# Patient Record
Sex: MALE | Race: WHITE | Employment: PART TIME | URBAN - METROPOLITAN AREA
[De-identification: names, ages, dates, MRNs, and addresses within clinical notes are randomized per-mention and may not be internally consistent; named-entity substitution may affect disease eponyms.]

---

## 2017-01-03 ENCOUNTER — TELEPHONE (OUTPATIENT)
Dept: INTERNAL MEDICINE CLINIC | Age: 82
End: 2017-01-03

## 2017-02-03 ENCOUNTER — TELEPHONE (OUTPATIENT)
Dept: INTERNAL MEDICINE CLINIC | Age: 82
End: 2017-02-03

## 2017-02-16 ENCOUNTER — TELEPHONE (OUTPATIENT)
Dept: INTERNAL MEDICINE CLINIC | Age: 82
End: 2017-02-16

## 2017-03-17 RX ORDER — AMIODARONE HYDROCHLORIDE 200 MG/1
TABLET ORAL
Qty: 60 TABLET | Refills: 0 | Status: SHIPPED | OUTPATIENT
Start: 2017-03-17 | End: 2017-09-28 | Stop reason: DRUGHIGH

## 2017-04-07 PROBLEM — D53.9 MACROCYTIC ANEMIA: Status: ACTIVE | Noted: 2017-04-07

## 2017-04-07 PROBLEM — M25.552 LEFT HIP PAIN: Status: ACTIVE | Noted: 2017-04-07

## 2017-04-09 PROBLEM — S32.592A CLOSED FRACTURE OF RAMUS OF LEFT PUBIS (HCC): Status: ACTIVE | Noted: 2017-04-09

## 2017-04-09 PROBLEM — M25.552 LEFT HIP PAIN: Status: RESOLVED | Noted: 2017-04-07 | Resolved: 2017-04-09

## 2017-04-11 ENCOUNTER — TELEPHONE (OUTPATIENT)
Dept: INTERNAL MEDICINE CLINIC | Age: 82
End: 2017-04-11

## 2017-04-18 ENCOUNTER — CARE COORDINATION (OUTPATIENT)
Dept: CASE MANAGEMENT | Age: 82
End: 2017-04-18

## 2017-04-19 ENCOUNTER — CARE COORDINATION (OUTPATIENT)
Dept: CASE MANAGEMENT | Age: 82
End: 2017-04-19

## 2017-05-22 ENCOUNTER — TELEPHONE (OUTPATIENT)
Dept: INTERNAL MEDICINE CLINIC | Age: 82
End: 2017-05-22

## 2017-06-21 RX ORDER — TAMSULOSIN HYDROCHLORIDE 0.4 MG/1
CAPSULE ORAL
Qty: 30 CAPSULE | Refills: 5 | Status: SHIPPED | OUTPATIENT
Start: 2017-06-21

## 2017-08-26 RX ORDER — ALLOPURINOL 100 MG/1
TABLET ORAL
Qty: 180 TABLET | Refills: 3 | Status: SHIPPED | OUTPATIENT
Start: 2017-08-26 | End: 2017-09-28

## 2017-09-05 ENCOUNTER — TELEPHONE (OUTPATIENT)
Dept: INTERNAL MEDICINE CLINIC | Age: 82
End: 2017-09-05

## 2017-09-05 DIAGNOSIS — M79.602 PAIN OF LEFT UPPER EXTREMITY: Primary | ICD-10-CM

## 2017-09-14 ENCOUNTER — HOSPITAL ENCOUNTER (OUTPATIENT)
Dept: OTHER | Age: 82
Discharge: HOME | End: 2017-09-14
Attending: INTERNAL MEDICINE | Admitting: INTERNAL MEDICINE

## 2017-09-14 DIAGNOSIS — M79.602 PAIN OF LEFT UPPER EXTREMITY: ICD-10-CM

## 2017-09-26 ENCOUNTER — TELEPHONE (OUTPATIENT)
Dept: INTERNAL MEDICINE CLINIC | Age: 82
End: 2017-09-26

## 2017-09-26 DIAGNOSIS — M25.531 RIGHT WRIST PAIN: Primary | ICD-10-CM

## 2017-09-28 ENCOUNTER — OFFICE VISIT (OUTPATIENT)
Dept: CARDIOLOGY CLINIC | Age: 82
End: 2017-09-28

## 2017-09-28 VITALS
HEIGHT: 69 IN | OXYGEN SATURATION: 98 % | SYSTOLIC BLOOD PRESSURE: 138 MMHG | HEART RATE: 68 BPM | BODY MASS INDEX: 23.55 KG/M2 | WEIGHT: 159 LBS | DIASTOLIC BLOOD PRESSURE: 64 MMHG

## 2017-09-28 DIAGNOSIS — I48.0 PAROXYSMAL ATRIAL FIBRILLATION (HCC): Primary | ICD-10-CM

## 2017-09-28 PROCEDURE — 93000 ELECTROCARDIOGRAM COMPLETE: CPT | Performed by: INTERNAL MEDICINE

## 2017-09-28 PROCEDURE — 99214 OFFICE O/P EST MOD 30 MIN: CPT | Performed by: INTERNAL MEDICINE

## 2017-09-28 RX ORDER — AMIODARONE HYDROCHLORIDE 200 MG/1
200 TABLET ORAL
Qty: 90 TABLET | Refills: 3 | Status: ON HOLD | COMMUNITY
Start: 2017-09-28 | End: 2018-06-19 | Stop reason: HOSPADM

## 2017-09-28 RX ORDER — AMIODARONE HYDROCHLORIDE 200 MG/1
200 TABLET ORAL DAILY
COMMUNITY
End: 2017-09-28 | Stop reason: DRUGHIGH

## 2017-10-16 ENCOUNTER — OFFICE VISIT (OUTPATIENT)
Dept: INTERNAL MEDICINE CLINIC | Age: 82
End: 2017-10-16

## 2017-10-16 VITALS
SYSTOLIC BLOOD PRESSURE: 130 MMHG | HEART RATE: 90 BPM | DIASTOLIC BLOOD PRESSURE: 84 MMHG | RESPIRATION RATE: 16 BRPM | WEIGHT: 153 LBS | BODY MASS INDEX: 22.59 KG/M2

## 2017-10-16 DIAGNOSIS — S90.821A BLISTER OF RIGHT FOOT, INITIAL ENCOUNTER: Primary | ICD-10-CM

## 2017-10-16 PROCEDURE — 99213 OFFICE O/P EST LOW 20 MIN: CPT | Performed by: INTERNAL MEDICINE

## 2017-11-17 ENCOUNTER — TELEPHONE (OUTPATIENT)
Dept: INTERNAL MEDICINE CLINIC | Age: 82
End: 2017-11-17

## 2017-11-17 NOTE — TELEPHONE ENCOUNTER
Pt is requesting a medication for bladder problems. Refused appt.   Wants to speak to Dr Emmett Donaldson

## 2017-11-21 ENCOUNTER — TELEPHONE (OUTPATIENT)
Dept: INTERNAL MEDICINE CLINIC | Age: 82
End: 2017-11-21

## 2017-11-21 NOTE — TELEPHONE ENCOUNTER
Pt said said he gets up every hour and a half to urinate. during the night. Please prescribe medication. Call patient.      Annette

## 2017-11-22 NOTE — TELEPHONE ENCOUNTER
Pt said Flomax is working well. Could Dr. Thu Booker prescribe something else? Call patient and let him know.      Fidencio

## 2017-11-22 NOTE — TELEPHONE ENCOUNTER
I really think that he needs to be checked by a urologist as this is not as simple as just writing another prescription.

## 2017-12-15 RX ORDER — APIXABAN 5 MG/1
TABLET, FILM COATED ORAL
Qty: 180 TABLET | Refills: 3 | Status: ON HOLD | OUTPATIENT
Start: 2017-12-15 | End: 2018-06-19 | Stop reason: HOSPADM

## 2018-02-22 ENCOUNTER — OFFICE VISIT (OUTPATIENT)
Dept: INTERNAL MEDICINE CLINIC | Age: 83
End: 2018-02-22

## 2018-02-22 VITALS
RESPIRATION RATE: 16 BRPM | WEIGHT: 147 LBS | DIASTOLIC BLOOD PRESSURE: 64 MMHG | SYSTOLIC BLOOD PRESSURE: 138 MMHG | BODY MASS INDEX: 21.77 KG/M2 | HEIGHT: 69 IN

## 2018-02-22 DIAGNOSIS — G89.29 CHRONIC BILATERAL LOW BACK PAIN WITH BILATERAL SCIATICA: Primary | ICD-10-CM

## 2018-02-22 DIAGNOSIS — M54.41 CHRONIC BILATERAL LOW BACK PAIN WITH BILATERAL SCIATICA: Primary | ICD-10-CM

## 2018-02-22 DIAGNOSIS — M54.42 CHRONIC BILATERAL LOW BACK PAIN WITH BILATERAL SCIATICA: Primary | ICD-10-CM

## 2018-02-22 PROCEDURE — G8484 FLU IMMUNIZE NO ADMIN: HCPCS | Performed by: INTERNAL MEDICINE

## 2018-02-22 PROCEDURE — G8420 CALC BMI NORM PARAMETERS: HCPCS | Performed by: INTERNAL MEDICINE

## 2018-02-22 PROCEDURE — 4040F PNEUMOC VAC/ADMIN/RCVD: CPT | Performed by: INTERNAL MEDICINE

## 2018-02-22 PROCEDURE — G8427 DOCREV CUR MEDS BY ELIG CLIN: HCPCS | Performed by: INTERNAL MEDICINE

## 2018-02-22 PROCEDURE — 1036F TOBACCO NON-USER: CPT | Performed by: INTERNAL MEDICINE

## 2018-02-22 PROCEDURE — 1123F ACP DISCUSS/DSCN MKR DOCD: CPT | Performed by: INTERNAL MEDICINE

## 2018-02-22 PROCEDURE — 99214 OFFICE O/P EST MOD 30 MIN: CPT | Performed by: INTERNAL MEDICINE

## 2018-02-22 RX ORDER — PREDNISONE 20 MG/1
TABLET ORAL
Qty: 18 TABLET | Refills: 0 | Status: SHIPPED | OUTPATIENT
Start: 2018-02-22 | End: 2018-03-04

## 2018-02-26 ENCOUNTER — HOSPITAL ENCOUNTER (OUTPATIENT)
Dept: MRI IMAGING | Age: 83
Discharge: HOME | End: 2018-02-26
Attending: INTERNAL MEDICINE | Admitting: INTERNAL MEDICINE

## 2018-02-26 DIAGNOSIS — G89.29 CHRONIC BILATERAL LOW BACK PAIN WITH BILATERAL SCIATICA: ICD-10-CM

## 2018-02-26 DIAGNOSIS — M54.42 LOW BACK PAIN WITH LEFT-SIDED SCIATICA: ICD-10-CM

## 2018-02-26 DIAGNOSIS — M54.42 CHRONIC BILATERAL LOW BACK PAIN WITH BILATERAL SCIATICA: ICD-10-CM

## 2018-02-26 DIAGNOSIS — M54.41 CHRONIC BILATERAL LOW BACK PAIN WITH BILATERAL SCIATICA: ICD-10-CM

## 2018-03-14 ENCOUNTER — OFFICE VISIT (OUTPATIENT)
Dept: ORTHOPEDIC SURGERY | Age: 83
End: 2018-03-14

## 2018-03-14 VITALS
HEIGHT: 69 IN | BODY MASS INDEX: 21.77 KG/M2 | SYSTOLIC BLOOD PRESSURE: 147 MMHG | HEART RATE: 74 BPM | WEIGHT: 147 LBS | DIASTOLIC BLOOD PRESSURE: 79 MMHG

## 2018-03-14 DIAGNOSIS — S32.030A CLOSED COMPRESSION FRACTURE OF THIRD LUMBAR VERTEBRA, INITIAL ENCOUNTER: Primary | ICD-10-CM

## 2018-03-14 PROCEDURE — 4040F PNEUMOC VAC/ADMIN/RCVD: CPT | Performed by: PHYSICIAN ASSISTANT

## 2018-03-14 PROCEDURE — G8427 DOCREV CUR MEDS BY ELIG CLIN: HCPCS | Performed by: PHYSICIAN ASSISTANT

## 2018-03-14 PROCEDURE — G8484 FLU IMMUNIZE NO ADMIN: HCPCS | Performed by: PHYSICIAN ASSISTANT

## 2018-03-14 PROCEDURE — G8420 CALC BMI NORM PARAMETERS: HCPCS | Performed by: PHYSICIAN ASSISTANT

## 2018-03-14 PROCEDURE — 99203 OFFICE O/P NEW LOW 30 MIN: CPT | Performed by: PHYSICIAN ASSISTANT

## 2018-06-17 PROBLEM — I48.91 ATRIAL FIBRILLATION WITH RVR (HCC): Status: ACTIVE | Noted: 2018-06-17

## 2018-06-18 PROBLEM — F03.90 DEMENTIA (HCC): Status: ACTIVE | Noted: 2018-06-18
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
## 2018-06-18 PROBLEM — I48.91 ATRIAL FIBRILLATION WITH RVR (HCC): Status: RESOLVED | Noted: 2018-06-17 | Resolved: 2018-06-18

## 2018-06-18 PROBLEM — E87.1 HYPONATREMIA: Status: ACTIVE | Noted: 2018-06-18

## 2018-06-18 PROBLEM — I47.1 SVT (SUPRAVENTRICULAR TACHYCARDIA) (HCC): Status: ACTIVE | Noted: 2018-06-18

## 2018-06-18 PROBLEM — S06.0XAA CLOSED HEAD INJURY WITH CONCUSSION: Status: ACTIVE | Noted: 2018-06-18

## 2018-06-18 PROBLEM — S01.01XA SCALP LACERATION: Status: ACTIVE | Noted: 2018-06-18

## 2018-06-20 ENCOUNTER — TELEPHONE (OUTPATIENT)
Dept: INTERNAL MEDICINE CLINIC | Age: 83
End: 2018-06-20

## 2018-06-20 ENCOUNTER — CARE COORDINATION (OUTPATIENT)
Dept: CASE MANAGEMENT | Age: 83
End: 2018-06-20

## 2018-06-28 ENCOUNTER — TELEPHONE (OUTPATIENT)
Dept: INTERNAL MEDICINE CLINIC | Age: 83
End: 2018-06-28

## 2018-07-03 ENCOUNTER — CARE COORDINATION (OUTPATIENT)
Dept: CASE MANAGEMENT | Age: 83
End: 2018-07-03

## 2018-07-03 DIAGNOSIS — I10 HYPERTENSION, BENIGN: Primary | ICD-10-CM

## 2018-07-03 PROCEDURE — 1111F DSCHRG MED/CURRENT MED MERGE: CPT | Performed by: INTERNAL MEDICINE

## 2018-07-09 ENCOUNTER — TELEPHONE (OUTPATIENT)
Dept: INTERNAL MEDICINE CLINIC | Age: 83
End: 2018-07-09

## 2018-07-09 NOTE — TELEPHONE ENCOUNTER
Cheryl Benjamin would like to speak to Dr Janny Berman re: getting him admitted for substance abuse problem

## 2018-07-11 ENCOUNTER — TELEPHONE (OUTPATIENT)
Dept: INTERNAL MEDICINE CLINIC | Age: 83
End: 2018-07-11

## 2018-07-11 NOTE — TELEPHONE ENCOUNTER
-Consuming 1.7L (4 bottles) every 4-5 days  -Decrease in appetite  - Decrease in hygiene   -Neglecting his pet    Per family, this has been a lifelong issue with a gradual increase

## 2018-07-11 NOTE — TELEPHONE ENCOUNTER
-multiple falls, -  -head injury with sutures for 1 fall  -multiple fractures in left hand from another fall. Paperwork has been faxed to us.      bon bowers- fax [phone] attn intake

## 2018-07-12 ENCOUNTER — OFFICE VISIT (OUTPATIENT)
Dept: ORTHOPEDIC SURGERY | Age: 83
End: 2018-07-12

## 2018-07-12 VITALS
SYSTOLIC BLOOD PRESSURE: 110 MMHG | DIASTOLIC BLOOD PRESSURE: 53 MMHG | BODY MASS INDEX: 19.26 KG/M2 | HEIGHT: 69 IN | RESPIRATION RATE: 16 BRPM | WEIGHT: 130 LBS

## 2018-07-12 DIAGNOSIS — S62.645A CLOSED NONDISPLACED FRACTURE OF PROXIMAL PHALANX OF LEFT RING FINGER, INITIAL ENCOUNTER: Primary | ICD-10-CM

## 2018-07-12 PROCEDURE — G8420 CALC BMI NORM PARAMETERS: HCPCS | Performed by: ORTHOPAEDIC SURGERY

## 2018-07-12 PROCEDURE — 4040F PNEUMOC VAC/ADMIN/RCVD: CPT | Performed by: ORTHOPAEDIC SURGERY

## 2018-07-12 PROCEDURE — G8427 DOCREV CUR MEDS BY ELIG CLIN: HCPCS | Performed by: ORTHOPAEDIC SURGERY

## 2018-07-12 PROCEDURE — 1036F TOBACCO NON-USER: CPT | Performed by: ORTHOPAEDIC SURGERY

## 2018-07-12 PROCEDURE — 1101F PT FALLS ASSESS-DOCD LE1/YR: CPT | Performed by: ORTHOPAEDIC SURGERY

## 2018-07-12 PROCEDURE — 26720 TREAT FINGER FRACTURE EACH: CPT | Performed by: ORTHOPAEDIC SURGERY

## 2018-07-12 PROCEDURE — 1123F ACP DISCUSS/DSCN MKR DOCD: CPT | Performed by: ORTHOPAEDIC SURGERY

## 2018-07-12 PROCEDURE — 99213 OFFICE O/P EST LOW 20 MIN: CPT | Performed by: ORTHOPAEDIC SURGERY

## 2018-07-12 PROCEDURE — 1111F DSCHRG MED/CURRENT MED MERGE: CPT | Performed by: ORTHOPAEDIC SURGERY

## 2018-07-16 ENCOUNTER — TELEPHONE (OUTPATIENT)
Dept: INTERNAL MEDICINE CLINIC | Age: 83
End: 2018-07-16

## 2018-07-16 PROBLEM — S22.080A COMPRESSION FRACTURE OF T12 VERTEBRA (HCC): Status: ACTIVE | Noted: 2018-07-16

## 2018-07-17 ENCOUNTER — TELEPHONE (OUTPATIENT)
Dept: ORTHOPEDIC SURGERY | Age: 83
End: 2018-07-17

## 2018-07-17 ENCOUNTER — TELEPHONE (OUTPATIENT)
Dept: INTERNAL MEDICINE CLINIC | Age: 83
End: 2018-07-17

## 2018-07-17 PROBLEM — E87.1 HYPONATREMIA: Status: ACTIVE | Noted: 2018-07-17

## 2018-07-17 NOTE — TELEPHONE ENCOUNTER
Have them ask Dr. Ml Escobedo to see for his hand - he has seen him before. I reviewed the MRI and I don't think his compression fracture will need any treatment.

## 2018-07-17 NOTE — TELEPHONE ENCOUNTER
Angelina informed per Dr Funmilayo Ernandez  Per Migue, in pt note it says to tape his 3 finger together, any other treatment?

## 2018-07-19 ENCOUNTER — CARE COORDINATION (OUTPATIENT)
Dept: CASE MANAGEMENT | Age: 83
End: 2018-07-19

## 2018-07-24 ENCOUNTER — CARE COORDINATION (OUTPATIENT)
Dept: CASE MANAGEMENT | Age: 83
End: 2018-07-24

## 2018-07-24 PROBLEM — S22.080A COMPRESSION FRACTURE OF T12 VERTEBRA (HCC): Chronic | Status: ACTIVE | Noted: 2018-07-16

## 2018-07-31 ENCOUNTER — CARE COORDINATION (OUTPATIENT)
Dept: CASE MANAGEMENT | Age: 83
End: 2018-07-31

## 2018-08-09 ENCOUNTER — CARE COORDINATION (OUTPATIENT)
Dept: CASE MANAGEMENT | Age: 83
End: 2018-08-09

## 2018-08-09 NOTE — CARE COORDINATION
[facility] Update Call    2018    Patient: Jenna Merino Patient : 1932   MRN: <[id number]>  Reason for Admission: There are no discharge diagnoses documented for the most recent discharge. Discharge Date: 18 RARS: Readmission Risk Score: 18       Care Transitions Post Acute Facility Update    Care Transitions Interventions  Post Acute Facility Update  Reported Nursing Issues:  Cognitive deficits   ADLs:  Stand by Assist - Presence and Cueing   Bed Mobility:  Independent   Transfer Assistance:  Independent        Per Ros Vivar, patient will remain LTC at d/c. Patient is able to propel self in w/c, transfers self from w/c to toilet independently. He was unsafe in [facility] apt at [facility] d/t ETOH abuse and has frequent falls. Therapy will continue to work with patient, no d/c date set at this time.      Eamon Bass RN  Care Transitions Coordinator  [phone]

## 2018-08-21 ENCOUNTER — CARE COORDINATION (OUTPATIENT)
Dept: CASE MANAGEMENT | Age: 83
End: 2018-08-21

## 2018-08-21 NOTE — CARE COORDINATION
Per Susana Mendoza at [facility], patient went LTC on 8/15/18. Will exclude from care transitions.      Ailene Closs, RN  Care Transitions Coordinator  [phone]

## 2020-01-29 ENCOUNTER — TELEPHONE (OUTPATIENT)
Dept: INTERNAL MEDICINE CLINIC | Age: 85
End: 2020-01-29

## 2020-07-01 ENCOUNTER — APPOINTMENT (OUTPATIENT)
Dept: GENERAL RADIOLOGY | Age: 85
DRG: 469 | End: 2020-07-01
Payer: MEDICARE

## 2020-07-01 ENCOUNTER — HOSPITAL ENCOUNTER (INPATIENT)
Age: 85
LOS: 9 days | Discharge: NURSING, REHAB OR LONG-TERM CARE | DRG: 469 | End: 2020-07-10
Attending: INTERNAL MEDICINE | Admitting: INTERNAL MEDICINE
Payer: MEDICARE

## 2020-07-01 ENCOUNTER — APPOINTMENT (OUTPATIENT)
Dept: CT IMAGING | Age: 85
DRG: 469 | End: 2020-07-01
Payer: MEDICARE

## 2020-07-01 PROBLEM — S72.001A CLOSED RIGHT HIP FRACTURE, INITIAL ENCOUNTER (HCC): Status: ACTIVE | Noted: 2020-07-01

## 2020-07-01 LAB
ANION GAP SERPL CALCULATED.3IONS-SCNC: 10 MMOL/L (ref 3–16)
BASOPHILS ABSOLUTE: 0.1 K/UL (ref 0–0.2)
BASOPHILS RELATIVE PERCENT: 0.5 %
BILIRUBIN URINE: NEGATIVE
BLOOD, URINE: NEGATIVE
BUN BLDV-MCNC: 17 MG/DL (ref 7–20)
CALCIUM SERPL-MCNC: 8 MG/DL (ref 8.3–10.6)
CHLORIDE BLD-SCNC: 107 MMOL/L (ref 99–110)
CLARITY: CLEAR
CO2: 23 MMOL/L (ref 21–32)
COLOR: YELLOW
CREAT SERPL-MCNC: 1.4 MG/DL (ref 0.8–1.3)
EOSINOPHILS ABSOLUTE: 0.4 K/UL (ref 0–0.6)
EOSINOPHILS RELATIVE PERCENT: 3.5 %
EPITHELIAL CELLS, UA: 0 /HPF (ref 0–5)
GFR AFRICAN AMERICAN: 58
GFR NON-AFRICAN AMERICAN: 48
GLUCOSE BLD-MCNC: 101 MG/DL (ref 70–99)
GLUCOSE URINE: NEGATIVE MG/DL
HCT VFR BLD CALC: 28.6 % (ref 40.5–52.5)
HEMOGLOBIN: 9.4 G/DL (ref 13.5–17.5)
HYALINE CASTS: 0 /LPF (ref 0–8)
KETONES, URINE: NEGATIVE MG/DL
LEUKOCYTE ESTERASE, URINE: ABNORMAL
LYMPHOCYTES ABSOLUTE: 0.8 K/UL (ref 1–5.1)
LYMPHOCYTES RELATIVE PERCENT: 7.6 %
MCH RBC QN AUTO: 31.7 PG (ref 26–34)
MCHC RBC AUTO-ENTMCNC: 32.7 G/DL (ref 31–36)
MCV RBC AUTO: 96.8 FL (ref 80–100)
MICROSCOPIC EXAMINATION: YES
MONOCYTES ABSOLUTE: 0.8 K/UL (ref 0–1.3)
MONOCYTES RELATIVE PERCENT: 7.9 %
NEUTROPHILS ABSOLUTE: 8.6 K/UL (ref 1.7–7.7)
NEUTROPHILS RELATIVE PERCENT: 80.5 %
NITRITE, URINE: NEGATIVE
PDW BLD-RTO: 15.3 % (ref 12.4–15.4)
PH UA: 7 (ref 5–8)
PLATELET # BLD: 364 K/UL (ref 135–450)
PMV BLD AUTO: 7.6 FL (ref 5–10.5)
POTASSIUM REFLEX MAGNESIUM: 4.6 MMOL/L (ref 3.5–5.1)
PROTEIN UA: NEGATIVE MG/DL
RBC # BLD: 2.96 M/UL (ref 4.2–5.9)
RBC UA: 1 /HPF (ref 0–4)
SARS-COV-2, NAAT: NOT DETECTED
SODIUM BLD-SCNC: 140 MMOL/L (ref 136–145)
SPECIFIC GRAVITY UA: 1.01 (ref 1–1.03)
URINE REFLEX TO CULTURE: ABNORMAL
URINE TYPE: ABNORMAL
UROBILINOGEN, URINE: 2 E.U./DL
WBC # BLD: 10.7 K/UL (ref 4–11)
WBC UA: 1 /HPF (ref 0–5)

## 2020-07-01 PROCEDURE — 2580000003 HC RX 258: Performed by: INTERNAL MEDICINE

## 2020-07-01 PROCEDURE — U0002 COVID-19 LAB TEST NON-CDC: HCPCS

## 2020-07-01 PROCEDURE — 85025 COMPLETE CBC W/AUTO DIFF WBC: CPT

## 2020-07-01 PROCEDURE — 0HQ1XZZ REPAIR FACE SKIN, EXTERNAL APPROACH: ICD-10-PCS | Performed by: PHYSICIAN ASSISTANT

## 2020-07-01 PROCEDURE — 73502 X-RAY EXAM HIP UNI 2-3 VIEWS: CPT

## 2020-07-01 PROCEDURE — 6360000002 HC RX W HCPCS: Performed by: NURSE PRACTITIONER

## 2020-07-01 PROCEDURE — 12013 RPR F/E/E/N/L/M 2.6-5.0 CM: CPT

## 2020-07-01 PROCEDURE — 99285 EMERGENCY DEPT VISIT HI MDM: CPT

## 2020-07-01 PROCEDURE — 70450 CT HEAD/BRAIN W/O DYE: CPT

## 2020-07-01 PROCEDURE — 1200000000 HC SEMI PRIVATE

## 2020-07-01 PROCEDURE — 99221 1ST HOSP IP/OBS SF/LOW 40: CPT | Performed by: NURSE PRACTITIONER

## 2020-07-01 PROCEDURE — 80048 BASIC METABOLIC PNL TOTAL CA: CPT

## 2020-07-01 PROCEDURE — 81001 URINALYSIS AUTO W/SCOPE: CPT

## 2020-07-01 PROCEDURE — 72125 CT NECK SPINE W/O DYE: CPT

## 2020-07-01 PROCEDURE — 6360000002 HC RX W HCPCS: Performed by: PHYSICIAN ASSISTANT

## 2020-07-01 PROCEDURE — 96374 THER/PROPH/DIAG INJ IV PUSH: CPT

## 2020-07-01 PROCEDURE — 96372 THER/PROPH/DIAG INJ SC/IM: CPT

## 2020-07-01 PROCEDURE — 6370000000 HC RX 637 (ALT 250 FOR IP): Performed by: INTERNAL MEDICINE

## 2020-07-01 PROCEDURE — 6360000002 HC RX W HCPCS: Performed by: INTERNAL MEDICINE

## 2020-07-01 RX ORDER — GABAPENTIN 100 MG/1
100 CAPSULE ORAL 2 TIMES DAILY
COMMUNITY

## 2020-07-01 RX ORDER — GABAPENTIN 100 MG/1
100 CAPSULE ORAL 2 TIMES DAILY
Status: DISCONTINUED | OUTPATIENT
Start: 2020-07-01 | End: 2020-07-10 | Stop reason: HOSPADM

## 2020-07-01 RX ORDER — FUROSEMIDE 20 MG/1
20 TABLET ORAL DAILY
Status: ON HOLD | COMMUNITY
End: 2020-07-10 | Stop reason: HOSPADM

## 2020-07-01 RX ORDER — AMITRIPTYLINE HYDROCHLORIDE 25 MG/1
12.5 TABLET, FILM COATED ORAL NIGHTLY
Status: DISCONTINUED | OUTPATIENT
Start: 2020-07-01 | End: 2020-07-10 | Stop reason: HOSPADM

## 2020-07-01 RX ORDER — MAGNESIUM SULFATE IN WATER 40 MG/ML
2 INJECTION, SOLUTION INTRAVENOUS PRN
Status: DISCONTINUED | OUTPATIENT
Start: 2020-07-01 | End: 2020-07-08

## 2020-07-01 RX ORDER — TAMSULOSIN HYDROCHLORIDE 0.4 MG/1
0.4 CAPSULE ORAL DAILY
Status: DISCONTINUED | OUTPATIENT
Start: 2020-07-02 | End: 2020-07-10 | Stop reason: HOSPADM

## 2020-07-01 RX ORDER — SODIUM CHLORIDE 0.9 % (FLUSH) 0.9 %
10 SYRINGE (ML) INJECTION EVERY 12 HOURS SCHEDULED
Status: DISCONTINUED | OUTPATIENT
Start: 2020-07-01 | End: 2020-07-10 | Stop reason: HOSPADM

## 2020-07-01 RX ORDER — AMIODARONE HYDROCHLORIDE 200 MG/1
200 TABLET ORAL DAILY
Status: DISCONTINUED | OUTPATIENT
Start: 2020-07-02 | End: 2020-07-10 | Stop reason: HOSPADM

## 2020-07-01 RX ORDER — PROMETHAZINE HYDROCHLORIDE 25 MG/1
12.5 TABLET ORAL EVERY 6 HOURS PRN
Status: DISCONTINUED | OUTPATIENT
Start: 2020-07-01 | End: 2020-07-10 | Stop reason: HOSPADM

## 2020-07-01 RX ORDER — MORPHINE SULFATE 10 MG/ML
6 INJECTION, SOLUTION INTRAMUSCULAR; INTRAVENOUS ONCE
Status: DISCONTINUED | OUTPATIENT
Start: 2020-07-01 | End: 2020-07-01

## 2020-07-01 RX ORDER — ACETAMINOPHEN 325 MG/1
650 TABLET ORAL EVERY 6 HOURS PRN
Status: DISCONTINUED | OUTPATIENT
Start: 2020-07-01 | End: 2020-07-10 | Stop reason: HOSPADM

## 2020-07-01 RX ORDER — ACETAMINOPHEN 650 MG/1
650 SUPPOSITORY RECTAL EVERY 6 HOURS PRN
Status: DISCONTINUED | OUTPATIENT
Start: 2020-07-01 | End: 2020-07-10 | Stop reason: HOSPADM

## 2020-07-01 RX ORDER — POLYVINYL ALCOHOL 14 MG/ML
1 SOLUTION/ DROPS OPHTHALMIC 2 TIMES DAILY
Status: DISCONTINUED | OUTPATIENT
Start: 2020-07-01 | End: 2020-07-10 | Stop reason: HOSPADM

## 2020-07-01 RX ORDER — DIVALPROEX SODIUM 125 MG/1
250 CAPSULE, COATED PELLETS ORAL NIGHTLY
Status: DISCONTINUED | OUTPATIENT
Start: 2020-07-01 | End: 2020-07-10 | Stop reason: HOSPADM

## 2020-07-01 RX ORDER — SODIUM CHLORIDE 0.9 % (FLUSH) 0.9 %
10 SYRINGE (ML) INJECTION PRN
Status: DISCONTINUED | OUTPATIENT
Start: 2020-07-01 | End: 2020-07-10 | Stop reason: HOSPADM

## 2020-07-01 RX ORDER — AMITRIPTYLINE HYDROCHLORIDE 25 MG/1
12.5 TABLET, FILM COATED ORAL NIGHTLY
COMMUNITY

## 2020-07-01 RX ORDER — METOPROLOL SUCCINATE 50 MG/1
50 TABLET, EXTENDED RELEASE ORAL DAILY
Status: DISCONTINUED | OUTPATIENT
Start: 2020-07-02 | End: 2020-07-10 | Stop reason: HOSPADM

## 2020-07-01 RX ORDER — POTASSIUM CHLORIDE 7.45 MG/ML
10 INJECTION INTRAVENOUS PRN
Status: DISCONTINUED | OUTPATIENT
Start: 2020-07-01 | End: 2020-07-08

## 2020-07-01 RX ORDER — MORPHINE SULFATE 4 MG/ML
4 INJECTION, SOLUTION INTRAMUSCULAR; INTRAVENOUS ONCE
Status: DISCONTINUED | OUTPATIENT
Start: 2020-07-01 | End: 2020-07-01

## 2020-07-01 RX ORDER — POLYETHYLENE GLYCOL 3350 17 G/17G
17 POWDER, FOR SOLUTION ORAL DAILY PRN
Status: DISCONTINUED | OUTPATIENT
Start: 2020-07-01 | End: 2020-07-09

## 2020-07-01 RX ORDER — CHOLECALCIFEROL (VITAMIN D3) 125 MCG
5 CAPSULE ORAL NIGHTLY
COMMUNITY

## 2020-07-01 RX ORDER — ACETAMINOPHEN 500 MG
1000 TABLET ORAL NIGHTLY
Status: ON HOLD | COMMUNITY
End: 2020-07-10 | Stop reason: HOSPADM

## 2020-07-01 RX ORDER — LANOLIN ALCOHOL/MO/W.PET/CERES
4.5 CREAM (GRAM) TOPICAL NIGHTLY
Status: DISCONTINUED | OUTPATIENT
Start: 2020-07-01 | End: 2020-07-10 | Stop reason: HOSPADM

## 2020-07-01 RX ORDER — LANOLIN ALCOHOL/MO/W.PET/CERES
1000 CREAM (GRAM) TOPICAL DAILY
COMMUNITY

## 2020-07-01 RX ORDER — DIVALPROEX SODIUM 125 MG/1
250 CAPSULE, COATED PELLETS ORAL NIGHTLY
COMMUNITY

## 2020-07-01 RX ORDER — ONDANSETRON 2 MG/ML
4 INJECTION INTRAMUSCULAR; INTRAVENOUS EVERY 6 HOURS PRN
Status: DISCONTINUED | OUTPATIENT
Start: 2020-07-01 | End: 2020-07-10 | Stop reason: HOSPADM

## 2020-07-01 RX ORDER — MORPHINE SULFATE 4 MG/ML
4 INJECTION, SOLUTION INTRAMUSCULAR; INTRAVENOUS EVERY 30 MIN PRN
Status: COMPLETED | OUTPATIENT
Start: 2020-07-01 | End: 2020-07-01

## 2020-07-01 RX ORDER — MORPHINE SULFATE 10 MG/ML
6 INJECTION, SOLUTION INTRAMUSCULAR; INTRAVENOUS ONCE
Status: COMPLETED | OUTPATIENT
Start: 2020-07-01 | End: 2020-07-01

## 2020-07-01 RX ORDER — POTASSIUM CHLORIDE 20 MEQ/1
40 TABLET, EXTENDED RELEASE ORAL PRN
Status: DISCONTINUED | OUTPATIENT
Start: 2020-07-01 | End: 2020-07-08

## 2020-07-01 RX ADMIN — MORPHINE SULFATE 4 MG: 4 INJECTION INTRAVENOUS at 18:06

## 2020-07-01 RX ADMIN — ENOXAPARIN SODIUM 30 MG: 30 INJECTION SUBCUTANEOUS at 19:38

## 2020-07-01 RX ADMIN — MORPHINE SULFATE 6 MG: 10 INJECTION, SOLUTION INTRAMUSCULAR; INTRAVENOUS at 17:23

## 2020-07-01 RX ADMIN — MORPHINE SULFATE 4 MG: 4 INJECTION INTRAVENOUS at 22:19

## 2020-07-01 RX ADMIN — Medication 4.5 MG: at 22:20

## 2020-07-01 RX ADMIN — GABAPENTIN 100 MG: 100 CAPSULE ORAL at 22:20

## 2020-07-01 RX ADMIN — AMITRIPTYLINE HYDROCHLORIDE 12.5 MG: 25 TABLET, FILM COATED ORAL at 22:20

## 2020-07-01 RX ADMIN — SODIUM CHLORIDE, PRESERVATIVE FREE 10 ML: 5 INJECTION INTRAVENOUS at 22:25

## 2020-07-01 RX ADMIN — DIVALPROEX SODIUM 250 MG: 125 CAPSULE, COATED PELLETS ORAL at 22:21

## 2020-07-01 ASSESSMENT — ENCOUNTER SYMPTOMS
SHORTNESS OF BREATH: 0
VOMITING: 0
NAUSEA: 0
ABDOMINAL PAIN: 0
EYE PAIN: 0
BACK PAIN: 0
DIARRHEA: 0
COUGH: 0

## 2020-07-01 ASSESSMENT — PAIN DESCRIPTION - LOCATION
LOCATION: HIP

## 2020-07-01 ASSESSMENT — PAIN SCALES - GENERAL
PAINLEVEL_OUTOF10: 10
PAINLEVEL_OUTOF10: 10
PAINLEVEL_OUTOF10: 6
PAINLEVEL_OUTOF10: 9
PAINLEVEL_OUTOF10: 10

## 2020-07-01 ASSESSMENT — PAIN - FUNCTIONAL ASSESSMENT
PAIN_FUNCTIONAL_ASSESSMENT: PREVENTS OR INTERFERES SOME ACTIVE ACTIVITIES AND ADLS

## 2020-07-01 ASSESSMENT — PAIN DESCRIPTION - PROGRESSION
CLINICAL_PROGRESSION: NOT CHANGED

## 2020-07-01 ASSESSMENT — PAIN DESCRIPTION - PAIN TYPE
TYPE: ACUTE PAIN

## 2020-07-01 ASSESSMENT — PAIN SCALES - WONG BAKER: WONGBAKER_NUMERICALRESPONSE: 0

## 2020-07-01 ASSESSMENT — PAIN DESCRIPTION - DESCRIPTORS
DESCRIPTORS: ACHING

## 2020-07-01 ASSESSMENT — PAIN DESCRIPTION - FREQUENCY
FREQUENCY: CONTINUOUS

## 2020-07-01 ASSESSMENT — PAIN DESCRIPTION - ONSET
ONSET: ON-GOING

## 2020-07-01 ASSESSMENT — PAIN DESCRIPTION - ORIENTATION
ORIENTATION: RIGHT

## 2020-07-01 NOTE — LETTER
2020      [address] [place] [address] Ortho & Spine  Consult Billing Form:      DEMOGRAPHICS:                                                                                                              .    Patient Name:  Carol Moralez  Patient :  1932   Patient SS#:  [id number]    Patient Phone:  [phone] (home) [phone] (work) Alt. Patient Phone:    Patient Address:  [facility] [address] [facility]    PCP:  Oziel Fontaine MD  Insurance:  Payor: [employer] / Plan: [employer] / Product Type: *No Product type* /   Insurance ID Number:    DIAGNOSIS & PROCEDURE:                                                                                            .    Diagnosis:   Right hip fx    Hospital:  [facility]    Provider:  Josh NUR    SCHEDULING INFORMATION:                                                                                         .     Date of Consultation:                              Josh NUR  20     BILLING INFORMATION:                                                                                                    .    Procedure:       CPT Code Modifier

## 2020-07-01 NOTE — ED PROVIDER NOTES
[facility]        Pt Name: Cesar Yu  MRN: [id number]  [place] 9/18/1932  Date of evaluation: 7/1/2020  Provider: CALEB Park  PCP: Irena Siddiqi MD    Evaluation by LONA. My supervising physician was available for consultation. CHIEF COMPLAINT       Chief Complaint   Patient presents with    Fall     right hip pain and head laceration from fall today at NH.  pt caught feet in blanket and fell getting ouit of bed       HISTORY OF PRESENT ILLNESS   (Location, Timing/Onset, Context/Setting, Quality, Duration, Modifying Factors, Severity, Associated Signs and Symptoms)  Note limiting factors. Cesar Yu is a 80 y.o. male with PMH of Afib not on blood thinners, who presents to the emergency department for evaluation after fall from bed. The patient states that he was trying to get out of bed but his feet got caught in the sheets and he fell, landing on the right side of his face and right hip on the floor. He is not on blood thinners. He thinks that he lost consciousness for several seconds. He denies severe headache, retrograde amnesia, confusion, nausea or vomiting. He reports 10 out of 10 right hip pain that is aching and throbbing and constant. He is unable to move the right leg due to hip pain. No numbness or tingling. No neck or back pain. No other acute concerns, associated symptoms or modifying factors. Nursing Notes were all reviewed and agreed with or any disagreements were addressed in the HPI. REVIEW OF SYSTEMS    (2-9 systems for level 4, 10 or more for level 5)     Review of Systems   Constitutional: Negative for chills, fatigue and fever. Eyes: Negative for pain. Respiratory: Negative for cough and shortness of breath. Cardiovascular: Negative for chest pain. Gastrointestinal: Negative for abdominal pain, diarrhea, nausea and vomiting. Genitourinary: Negative for dysuria. Musculoskeletal: Positive for arthralgias. Negative for back pain, neck pain and neck stiffness. Skin: Positive for wound. Negative for rash. Neurological: Negative for dizziness and headaches. Psychiatric/Behavioral: Negative for confusion. Positives and Pertinent negatives as per HPI. Except as noted above in the ROS, all other systems were reviewed and negative. PAST MEDICAL HISTORY     Past Medical History:   Diagnosis Date    Alcohol abuse     Arthritis of left knee     advanced    Atrial fibrillation ([address]     HTN (hypertension)     Hyperlipidemia     PAF (paroxysmal atrial fibrillation) (HCC)          SURGICAL HISTORY     Past Surgical History:   Procedure Laterality Date    HIP FRACTURE SURGERY Left          CURRENTMEDICATIONS       Previous Medications    ACETAMINOPHEN (TYLENOL) 325 MG TABLET    Take 325-650 mg by mouth every 6 hours as needed for Pain    AMIODARONE (CORDARONE) 200 MG TABLET    Take 1 tablet by mouth daily    CALCIUM-VITAMIN D (OSCAL-500) 500-200 MG-UNIT PER TABLET    Take 1 tablet by mouth daily. METOPROLOL SUCCINATE (TOPROL XL) 50 MG EXTENDED RELEASE TABLET    Take 1 tablet by mouth daily    NUTRITIONAL SUPPLEMENTS (ENSURE PO)    Take 1 Can by mouth daily    TAMSULOSIN (FLOMAX) 0.4 MG CAPSULE    TAKE ONE CAPSULE BY MOUTH DAILY         ALLERGIES     Patient has no known allergies.     FAMILYHISTORY       Family History   Problem Relation Age of Onset    Other Mother     Other Father     Heart Disease Father           SOCIAL HISTORY       Social History     Tobacco Use    Smoking status: Never Smoker    Smokeless tobacco: Never Used   Substance Use Topics    Alcohol use: Yes     Comment: 20 months sober     Drug use: No       SCREENINGS             PHYSICAL EXAM    (up to 7 for level 4, 8 or more for level 5)     ED Triage Vitals [07/01/20 1604]   BP Temp Temp Source Pulse Resp SpO2 Height Weight   (!) 153/74 98.4 °F (36.9 °C) Oral 77 19 94 % -- 145 lb 1 oz (65.8 kg)       Physical Exam  Vitals signs and nursing note reviewed. Constitutional:       General: He is in acute distress (appears to be in pain). Appearance: He is well-developed. He is not diaphoretic. HENT:      Head: Normocephalic. Eyes:      General:         Right eye: No discharge. Left eye: No discharge. Extraocular Movements: Extraocular movements intact. Pupils: Pupils are equal, round, and reactive to light. Neck:      Musculoskeletal: Normal range of motion and neck supple. Cardiovascular:      Rate and Rhythm: Normal rate and regular rhythm. Pulmonary:      Effort: No respiratory distress. Breath sounds: No stridor. Abdominal:      Palpations: Abdomen is soft. Tenderness: There is no abdominal tenderness. Musculoskeletal:      Right hip: He exhibits decreased range of motion, tenderness and bony tenderness. Skin:     General: Skin is warm and dry. Coloration: Skin is not pale. Neurological:      Mental Status: He is alert and oriented to person, place, and time. Comments: No gross facial drooping. Moves all 4 extremities spontaneously.    Psychiatric:         Behavior: Behavior normal.         DIAGNOSTIC RESULTS   LABS:    Labs Reviewed   CBC WITH AUTO DIFFERENTIAL - Abnormal; Notable for the following components:       Result Value    RBC 2.96 (*)     Hemoglobin 9.4 (*)     Hematocrit 28.6 (*)     Neutrophils Absolute 8.6 (*)     Lymphocytes Absolute 0.8 (*)     All other components within normal limits    Narrative:     Performed at:  [facility]  [address] [facility] [address]   Phone [phone]   URINE RT REFLEX TO CULTURE - Abnormal; Notable for the following components:    Urobilinogen, Urine 2.0 (*)     Leukocyte Esterase, Urine TRACE (*)     All other components within normal limits    Narrative:     Performed at:  [facility]  [address] [place] [address] PROCEDURES   Unless otherwise noted below, none     Procedures    CRITICAL CARE TIME   N/A    CONSULTS:  IP CONSULT TO ORTHOPEDIC SURGERY      EMERGENCY DEPARTMENT COURSE and DIFFERENTIAL DIAGNOSIS/MDM:   Vitals:    Vitals:    07/01/20 1604   BP: (!) 153/74   Pulse: 77   Resp: 19   Temp: 98.4 °F (36.9 °C)   TempSrc: Oral   SpO2: 94%   Weight: 145 lb 1 oz (65.8 kg)       Patient was given the following medications:  Medications   morphine injection 6 mg (has no administration in time range)   morphine (PF) injection 4 mg (has no administration in time range)           Fall Differential Diagnosis: Cardiac Arrhythmia, Stroke, Sepsis/Infection, Anemia, Fracture, Dislocation, Epidural Hematoma, Subdural Hematoma, Parenchymal Brain contusion or bleed, Subarachnoid hemorrhage, Skull Fracture, Neck Fracture or Dislocation, other. Patient seen and examined today for head injury and right hip pain s/p fall from bed. See HPI for patient presentation. Patient is in no acute distress, nontoxic, afebrile with unremarkable vital signs. He is neurovascularly intact  Ordered IV morphine for hip pain  XR of right hip reveals subcapital right femoral neck fracture, may be pathologic as there are possible destructive bony lesions within the femoral neck. Consult was placed to Ortho. Labs and further work-up with CT head, C-spine pending    I consulted with orthopedics, spoke with Mary Pollard NP, who advised admission with the hospitalist and Ortho will consult. Dr Greta Rivero will do his surgery tomorrow. N.p.o. after midnight. CT head/cspine negative for acute process. Repaired lac as detailed below. PROCEDURE:  LACERATION REPAIR of face; right temporal region  Alpesh Bonilla or their surrogate had an opportunity to ask questions, and the risks, benefits, and alternatives were discussed. The wound was prepped and draped to maintain a sterile field. A local anesthetic was used to completely anesthetize the wound.  It was copiously irrigated. It was explored to its depth in a bloodless field with no sign of tendon, nerve, or vascular injury. No foreign bodies were identified. It was closed with 3 simple interrupted sutures of 6-0 ethilon sutures. There were no complications during the procedure. TOTAL REPAIRED WOUND LENGTH: 2.75 cm    Reviewed labs. Consult placed to hospitalist. COVID swab pending. Because of high probability of sudden clinical deterioration of the patient's condition or  further deterioration, critical care time included my full attention to the patient's condition, including chart data review, documentation, medication ordering, reviewing the patient's old records, reevaluation patient's cardiac, pulmonary and neurological status. Reassessment of vital signs. Consultations with ortho and admitting physician. Ordering, interpreting reviewing diagnostic testing. Therefore, my critical care time was 30 minutes of direct attention to the patient's condition did not include time spent on procedures.       Results for orders placed or performed during the hospital encounter of 07/01/20   CBC Auto Differential   Result Value Ref Range    WBC 10.7 4.0 - 11.0 K/uL    RBC 2.96 (L) 4.20 - 5.90 M/uL    Hemoglobin 9.4 (L) 13.5 - 17.5 g/dL    Hematocrit 28.6 (L) 40.5 - 52.5 %    MCV 96.8 80.0 - 100.0 fL    MCH 31.7 26.0 - 34.0 pg    MCHC 32.7 31.0 - 36.0 g/dL    RDW 15.3 12.4 - 15.4 %    Platelets 383 427 - 617 K/uL    MPV 7.6 5.0 - 10.5 fL    Neutrophils % 80.5 %    Lymphocytes % 7.6 %    Monocytes % 7.9 %    Eosinophils % 3.5 %    Basophils % 0.5 %    Neutrophils Absolute 8.6 (H) 1.7 - 7.7 K/uL    Lymphocytes Absolute 0.8 (L) 1.0 - 5.1 K/uL    Monocytes Absolute 0.8 0.0 - 1.3 K/uL    Eosinophils Absolute 0.4 0.0 - 0.6 K/uL    Basophils Absolute 0.1 0.0 - 0.2 K/uL   Urinalysis Reflex to Culture   Result Value Ref Range    Color, UA YELLOW Straw/Yellow    Clarity, UA Clear Clear    Glucose, Ur Negative Negative mg/dL Bilirubin Urine Negative Negative    Ketones, Urine Negative Negative mg/dL    Specific Gravity, UA 1.013 1.005 - 1.030    Blood, Urine Negative Negative    pH, UA 7.0 5.0 - 8.0    Protein, UA Negative Negative mg/dL    Urobilinogen, Urine 2.0 (A) <2.0 E.U./dL    Nitrite, Urine Negative Negative    Leukocyte Esterase, Urine TRACE (A) Negative    Microscopic Examination YES     Urine Type NotGiven        I spoke with Dr. Lyssa Tamayo. We thoroughly discussed the history, physical exam, laboratory and imaging studies, as well as, emergency department course. Based upon that discussion, we've decided to admit David Owen for further observation and evaluation of David Owen    FINAL IMPRESSION      1. Closed fracture of neck of right femur, initial encounter ([facility] [address]    2. Fall from bed, initial encounter    3. Injury of head, initial encounter    4. Facial laceration, initial encounter          DISPOSITION/PLAN   DISPOSITION        PATIENT REFERREDTO:  No follow-up provider specified.     DISCHARGE MEDICATIONS:  New Prescriptions    No medications on file       DISCONTINUED MEDICATIONS:  Discontinued Medications    No medications on file              (Please note that portions of this note were completed with a voice recognition program.  Efforts were made to edit the dictations but occasionally words are mis-transcribed.)    CALEB Henderson (electronically signed)            CALEB Henderson  07/01/20 [address] [place], [place]  07/01/20 4331

## 2020-07-01 NOTE — H&P
Hospital Medicine History & Physical      PCP: Saran Hernandez MD    Date of Admission: 7/1/2020    Date of Service: Pt seen/examined on 7/1/20 and Admitted to Inpatient     Chief Complaint: Right hip pain    History Of Present Illness: The patient is a 80 y.o. male who presents to [facility] with right hip pain. Patient was at his nursing home when he tripped and fell. He states he was unable to stand so he was brought to the emergency department. In the emergency department he was found to have a right subcapital hip fracture. There were some bony lesions on his right hip as well. Orthopedic surgery was consulted and will plan for right hip surgery tomorrow along with intraoperative biopsy. Patient will be admitted to the hospital for further care and intervention. Past Medical History:        Diagnosis Date    Alcohol abuse     Arthritis of left knee     advanced    Atrial fibrillation (HCC)     HTN (hypertension)     Hyperlipidemia     PAF (paroxysmal atrial fibrillation) (HCC)        Past Surgical History:        Procedure Laterality Date    HIP FRACTURE SURGERY Left        Medications Prior to Admission:    Prior to Admission medications    Medication Sig Start Date End Date Taking? Authorizing Provider   hypromellose (ISOPTO TEARS) 0.5 % ophthalmic solution Place 1 drop into both eyes 2 times daily    Yes Historical Provider, MD   amitriptyline (ELAVIL) 25 MG tablet Take 12.5 mg by mouth nightly   Yes Historical Provider, MD   gabapentin (NEURONTIN) 100 MG capsule Take 100 mg by mouth 2 times daily.    Yes Historical Provider, MD   furosemide (LASIX) 20 MG tablet Take 20 mg by mouth daily X 3 days until 7/4 for edema   Yes Historical Provider, MD   melatonin 5 MG TABS tablet Take 5 mg by mouth nightly   Yes Historical Provider, MD   acetaminophen (TYLENOL) 500 MG tablet Take 1,000 mg by mouth nightly   Yes Historical Provider, MD   vitamin B-12 (CYANOCOBALAMIN) 1000 MCG tablet Take 1,000 mcg by mouth daily   Yes Historical Provider, MD   divalproex (DEPAKOTE SPRINKLE) 125 MG capsule Take 250 mg by mouth nightly   Yes Historical Provider, MD   amiodarone (CORDARONE) 200 MG tablet Take 1 tablet by mouth daily 6/19/18  Yes Ellyn Gaitan MD   metoprolol succinate (TOPROL XL) 50 MG extended release tablet Take 1 tablet by mouth daily 6/19/18  Yes Ellyn Gaitan MD   tamsulosin (FLOMAX) 0.4 MG capsule TAKE ONE CAPSULE BY MOUTH DAILY 6/21/17  Yes Ellyn Gaitan MD   calcium-vitamin D (OSCAL-500) 500-200 MG-UNIT per tablet Take 1 tablet by mouth daily. Yes Historical Provider, MD   acetaminophen (TYLENOL) 325 MG tablet Take 650 mg by mouth every 6 hours as needed for Pain     Historical Provider, MD   Nutritional Supplements (ENSURE PO) Take 1 Can by mouth daily    Historical Provider, MD       Allergies:  Patient has no known allergies. Social History:  The patient currently lives at a nursing home    TOBACCO:   reports that he has never smoked. He has never used smokeless tobacco.  ETOH:   reports current alcohol use. Family History:  Reviewed in detail and negative for DM, Early CAD, Cancer, CVA. Positive as follows:        Problem Relation Age of Onset    Other Mother     Other Father     Heart Disease Father        REVIEW OF SYSTEMS:   Positive for as noted in the HPI. All other systems reviewed and negative. PHYSICAL EXAM:    BP (!) 153/74   Pulse 77   Temp 98.4 °F (36.9 °C) (Oral)   Resp 19   Wt 145 lb 1 oz (65.8 kg)   SpO2 94%   BMI 22.06 kg/m²     General appearance: No apparent distress appears stated age and cooperative. HEENT Normal cephalic, atraumatic without obvious deformity. Pupils equal, round, and reactive to light. Extra ocular muscles intact. Conjunctivae/corneas clear.   Neck: Supple, No jugular venous distention/bruits. Trachea midline without thyromegaly or adenopathy with full range of motion. Lungs: Clear to auscultation, bilaterally without Rales/Wheezes/Rhonchi with good respiratory effort. Heart: Regular rate and rhythm with Normal S1/S2 without murmurs, rubs or gallops, point of maximum impulse non-displaced  Abdomen: Soft, non-tender or non-distended without rigidity or guarding and positive bowel sounds all four quadrants. Extremities: Decreased range of motion right hip. Skin: Skin color, texture, turgor normal.  No rashes or lesions. Neurologic: Alert and oriented, neurovascularly intact with sensory/motor intact upper extremities/lower extremities, bilaterally. Cranial nerves: II-XII intact, grossly non-focal.  Mental status: Alert, oriented, thought content appropriate. Capillary Refill: Acceptable  < 3 seconds  Peripheral Pulses: +3 Easily felt, not easily obliterated with pressure      XR:  I have reviewed the XR with the following interpretation: Right hip fracture      CBC   Recent Labs     07/01/20  1701   WBC 10.7   HGB 9.4*   HCT 28.6*         RENAL  Recent Labs     07/01/20  1701      K 4.6      CO2 23   BUN 17   CREATININE 1.4*     LFT'S  No results for input(s): AST, ALT, ALB, BILIDIR, BILITOT, ALKPHOS in the last 72 hours. COAG  No results for input(s): INR in the last 72 hours. CARDIAC ENZYMES  No results for input(s): CKTOTAL, CKMB, CKMBINDEX, TROPONINI in the last 72 hours.     U/A:    Lab Results   Component Value Date    NITRITE neg 01/30/2013    COLORU YELLOW 07/01/2020    WBCUA 1 07/01/2020    RBCUA 1 07/01/2020    CLARITYU Clear 07/01/2020    SPECGRAV 1.013 07/01/2020    LEUKOCYTESUR TRACE 07/01/2020    BLOODU Negative 07/01/2020    GLUCOSEU Negative 07/01/2020       ABG  No results found for: BLR5RWN, BEART, L7QEDKQO, PHART, THGBART, SJQ4YMI, PO2ART, QJH1IMJ      PHYSICIANS CERTIFICATION:    I certify that Akira Keith is expected to be hospitalized for more than 2 midnights based on the following assessment and plan:      ASSESSMENT/PLAN:    Right hip fracture  Plan for OR tomorrow with orthopedic surgery  IV and oral pain medication  N.p.o. after midnight  Physical and Occupational Therapy    Paroxysmal atrial fibrillation  Continue home amiodarone  Hold on anticoagulation    Hypertension  Continue home medications    Depression  Continue home Elavil    DVT Prophylaxis: Lovenox, hold tomorrow morning  Diet: No diet orders on file  Code Status: Prior  PT/OT Eval Status: Ordered    Dispo -inpatient       Idalia Sidhu MD    Thank you Anaid Spears MD for the opportunity to be involved in this patient's care. If you have any questions or concerns please feel free to contact me at [phone].

## 2020-07-01 NOTE — ED NOTES
Pharmacy Medication Reconciliation Note     List of medications patient is currently taking is complete. Source of information:   1. [facility] list    No answer at [facility] when trying to verify if patient received medications today. He reports he did but he also has a history of dementia.      Zi Reynaga, PharmD, BCPS  7/1/2020  6:03 PM

## 2020-07-01 NOTE — ED TRIAGE NOTES
Pt to ED after fall today at his NH. Pt states he was getting out of bed and his foot got caught on the blanket. Pt states he fell to the floor from the bed. Pt with pain in right hip.   Pt with laceration and mild pain on right orbital.

## 2020-07-01 NOTE — CONSULTS
[facility] Orthopedic Surgery  Consult Note    This patient is seen in consultation at the request of Kevin ELLIS    Reason for Consult:  Right hip pain    CHIEF COMPLAINT:  Right hip pain    History Obtained From:  patient, electronic medical record    HISTORY OF PRESENT ILLNESS:    The patient is a 80 y.o. male who presents with right hip pain after tripping and falling in his nusring home today. He was unable to stand and brought to ER with noted. Pain is described in right hip and with the intensity of severe. Pain is described as aching, burning. Discomfort is constant. He is very [place] but awake and able to follow command. He has good recall of his fall. No other complaints at this time. Hx ETOH use    Past Medical History:        Diagnosis Date    Alcohol abuse     Arthritis of left knee     advanced    Atrial fibrillation (HCC)     HTN (hypertension)     Hyperlipidemia     PAF (paroxysmal atrial fibrillation) (HCC)        Past Surgical History:        Procedure Laterality Date    HIP FRACTURE SURGERY Left        Social History     Tobacco Use    Smoking status: Never Smoker    Smokeless tobacco: Never Used   Substance Use Topics    Alcohol use: Yes     Comment: 20 months sober        Family History   Problem Relation Age of Onset    Other Mother     Other Father     Heart Disease Father            Current Medications:   Current Facility-Administered Medications: morphine (PF) injection 4 mg, 4 mg, Intravenous, Q30 Min PRN  Allergies:   [id number]     REVIEW OF SYSTEMS:    CONSTITUTIONAL:  negative for  fevers, chills and malaise  MUSCULOSKELETAL:  positive for  myalgias, arthralgias and pain  All other ROS reviewed in chart or with patient or family and are grossly negative. PHYSICAL EXAM:    VITALS:  BP (!) 153/74   Pulse 77   Temp 98.4 °F (36.9 °C) (Oral)   Resp 19   Wt 145 lb 1 oz (65.8 kg)   SpO2 94%   BMI 22.06 kg/m²     MUSCULOSKELETAL:  bilateral foot NVI.  Wiggles toes to command. Pedal pulses are palpable. Right hip tender to palpation. Right knee slightly flexed in bed. Nontender left hip or bilateral knees or ankles. Nontender bilateral shoulder elbow or wrist. Notable right forehead laceration.    NEUROLOGIC:   Sensory:    Touch:                                       Right Lower Extremity:  normal                  Left Lower Extremity:  normal  Skin warm and dry  Resp deep and easy  Abdomen soft and round  Pulse is with regular rate and rhythm    DATA:    CBC:   Lab Results   Component Value Date    WBC 10.7 07/01/2020    RBC 2.96 07/01/2020    HGB 9.4 07/01/2020    HCT 28.6 07/01/2020    MCV 96.8 07/01/2020    MCH 31.7 07/01/2020    MCHC 32.7 07/01/2020    RDW 15.3 07/01/2020     07/01/2020    MPV 7.6 07/01/2020     WBC:    Lab Results   Component Value Date    WBC 10.7 07/01/2020     PT/INR:    Lab Results   Component Value Date    PROTIME 13.1 06/17/2018    INR 1.15 06/17/2018     PTT:    Lab Results   Component Value Date    APTT 49.5 06/17/2018   [APTT  Radiology Review:    Narrative   EXAMINATION:   TWO XRAY VIEWS OF THE RIGHT HIP       7/1/2020 4:15 pm       COMPARISON:   None.       HISTORY:   ORDERING SYSTEM PROVIDED HISTORY: pain s/p fall from bed   TECHNOLOGIST PROVIDED HISTORY:   Reason for exam:->pain s/p fall from bed   Reason for Exam: Fall (right hip pain and head laceration from fall today at   [facility]. pt caught feet in blanket and fell getting ouit of bed)   Acuity: Acute   Type of Exam: Initial       FINDINGS:   Subcapital right femoral neck fracture.  This could represent a pathologic   fracture as focal lucencies are seen within the right femoral neck.  No   dislocation.  Previous internal fixation left femoral neck.           Impression   Subcapital right femoral neck fracture, may be pathologic as there are   possible destructive bony lesions within the right femoral neck               IMPRESSION/RECOMMENDATIONS:    Fall  Right hip pain  Right subcapital hip fx  ? Bony lesions right hip, consider bx intraop.    Discussed with Dr Kathie Novak, plan right hip surgery tomorrow  NPO after MN  Lovenox x1 today then hold for surgery      Raylene Cranker  7/1/2020  5:45 PM

## 2020-07-02 ENCOUNTER — ANESTHESIA (OUTPATIENT)
Dept: OPERATING ROOM | Age: 85
DRG: 469 | End: 2020-07-02
Payer: MEDICARE

## 2020-07-02 ENCOUNTER — APPOINTMENT (OUTPATIENT)
Dept: CT IMAGING | Age: 85
DRG: 469 | End: 2020-07-02
Payer: MEDICARE

## 2020-07-02 ENCOUNTER — APPOINTMENT (OUTPATIENT)
Dept: GENERAL RADIOLOGY | Age: 85
DRG: 469 | End: 2020-07-02
Payer: MEDICARE

## 2020-07-02 ENCOUNTER — ANESTHESIA EVENT (OUTPATIENT)
Dept: OPERATING ROOM | Age: 85
DRG: 469 | End: 2020-07-02
Payer: MEDICARE

## 2020-07-02 ENCOUNTER — APPOINTMENT (OUTPATIENT)
Dept: NUCLEAR MEDICINE | Age: 85
DRG: 469 | End: 2020-07-02
Payer: MEDICARE

## 2020-07-02 LAB
ANION GAP SERPL CALCULATED.3IONS-SCNC: 17 MMOL/L (ref 3–16)
BASE EXCESS ARTERIAL: -2.4 MMOL/L (ref -3–3)
BASOPHILS ABSOLUTE: 0.1 K/UL (ref 0–0.2)
BASOPHILS RELATIVE PERCENT: 0.4 %
BUN BLDV-MCNC: 20 MG/DL (ref 7–20)
CALCIUM SERPL-MCNC: 8.6 MG/DL (ref 8.3–10.6)
CARBOXYHEMOGLOBIN ARTERIAL: 1.8 % (ref 0–1.5)
CHLORIDE BLD-SCNC: 105 MMOL/L (ref 99–110)
CO2: 18 MMOL/L (ref 21–32)
CREAT SERPL-MCNC: 1.6 MG/DL (ref 0.8–1.3)
EOSINOPHILS ABSOLUTE: 0.1 K/UL (ref 0–0.6)
EOSINOPHILS RELATIVE PERCENT: 0.3 %
GFR AFRICAN AMERICAN: 50
GFR NON-AFRICAN AMERICAN: 41
GLUCOSE BLD-MCNC: 120 MG/DL (ref 70–99)
HCO3 ARTERIAL: 21.9 MMOL/L (ref 21–29)
HCT VFR BLD CALC: 31.7 % (ref 40.5–52.5)
HEMOGLOBIN, ART, EXTENDED: 9.4 G/DL (ref 13.5–17.5)
HEMOGLOBIN: 10.1 G/DL (ref 13.5–17.5)
LYMPHOCYTES ABSOLUTE: 0.6 K/UL (ref 1–5.1)
LYMPHOCYTES RELATIVE PERCENT: 3.9 %
MCH RBC QN AUTO: 31.3 PG (ref 26–34)
MCHC RBC AUTO-ENTMCNC: 31.8 G/DL (ref 31–36)
MCV RBC AUTO: 98.4 FL (ref 80–100)
METHEMOGLOBIN ARTERIAL: 0.4 %
MONOCYTES ABSOLUTE: 1.3 K/UL (ref 0–1.3)
MONOCYTES RELATIVE PERCENT: 8.1 %
NEUTROPHILS ABSOLUTE: 14.3 K/UL (ref 1.7–7.7)
NEUTROPHILS RELATIVE PERCENT: 87.3 %
O2 CONTENT ARTERIAL: 12 ML/DL
O2 SAT, ARTERIAL: 93.3 %
O2 THERAPY: ABNORMAL
PCO2 ARTERIAL: 34.6 MMHG (ref 35–45)
PDW BLD-RTO: 15.7 % (ref 12.4–15.4)
PH ARTERIAL: 7.41 (ref 7.35–7.45)
PLATELET # BLD: 429 K/UL (ref 135–450)
PMV BLD AUTO: 7.5 FL (ref 5–10.5)
PO2 ARTERIAL: 65.3 MMHG (ref 75–108)
POTASSIUM REFLEX MAGNESIUM: 4.8 MMOL/L (ref 3.5–5.1)
PROCALCITONIN: 0.23 NG/ML (ref 0–0.15)
RBC # BLD: 3.22 M/UL (ref 4.2–5.9)
SODIUM BLD-SCNC: 140 MMOL/L (ref 136–145)
TCO2 ARTERIAL: 22.9 MMOL/L
WBC # BLD: 16.4 K/UL (ref 4–11)

## 2020-07-02 PROCEDURE — 6370000000 HC RX 637 (ALT 250 FOR IP): Performed by: INTERNAL MEDICINE

## 2020-07-02 PROCEDURE — 2700000000 HC OXYGEN THERAPY PER DAY

## 2020-07-02 PROCEDURE — 6360000002 HC RX W HCPCS: Performed by: INTERNAL MEDICINE

## 2020-07-02 PROCEDURE — 85025 COMPLETE CBC W/AUTO DIFF WBC: CPT

## 2020-07-02 PROCEDURE — 84145 PROCALCITONIN (PCT): CPT

## 2020-07-02 PROCEDURE — 2580000003 HC RX 258: Performed by: INTERNAL MEDICINE

## 2020-07-02 PROCEDURE — 6370000000 HC RX 637 (ALT 250 FOR IP): Performed by: NURSE PRACTITIONER

## 2020-07-02 PROCEDURE — 78580 LUNG PERFUSION IMAGING: CPT

## 2020-07-02 PROCEDURE — 94761 N-INVAS EAR/PLS OXIMETRY MLT: CPT

## 2020-07-02 PROCEDURE — 94669 MECHANICAL CHEST WALL OSCILL: CPT

## 2020-07-02 PROCEDURE — 36600 WITHDRAWAL OF ARTERIAL BLOOD: CPT

## 2020-07-02 PROCEDURE — A9540 TC99M MAA: HCPCS | Performed by: INTERNAL MEDICINE

## 2020-07-02 PROCEDURE — 71045 X-RAY EXAM CHEST 1 VIEW: CPT

## 2020-07-02 PROCEDURE — 99223 1ST HOSP IP/OBS HIGH 75: CPT | Performed by: INTERNAL MEDICINE

## 2020-07-02 PROCEDURE — 6360000002 HC RX W HCPCS: Performed by: NURSE PRACTITIONER

## 2020-07-02 PROCEDURE — 94150 VITAL CAPACITY TEST: CPT

## 2020-07-02 PROCEDURE — 1200000000 HC SEMI PRIVATE

## 2020-07-02 PROCEDURE — 72100 X-RAY EXAM L-S SPINE 2/3 VWS: CPT

## 2020-07-02 PROCEDURE — 36415 COLL VENOUS BLD VENIPUNCTURE: CPT

## 2020-07-02 PROCEDURE — 80048 BASIC METABOLIC PNL TOTAL CA: CPT

## 2020-07-02 PROCEDURE — 94640 AIRWAY INHALATION TREATMENT: CPT

## 2020-07-02 PROCEDURE — 71250 CT THORAX DX C-: CPT

## 2020-07-02 PROCEDURE — 3430000000 HC RX DIAGNOSTIC RADIOPHARMACEUTICAL: Performed by: INTERNAL MEDICINE

## 2020-07-02 PROCEDURE — 82803 BLOOD GASES ANY COMBINATION: CPT

## 2020-07-02 RX ORDER — FUROSEMIDE 10 MG/ML
40 INJECTION INTRAMUSCULAR; INTRAVENOUS 2 TIMES DAILY
Status: DISCONTINUED | OUTPATIENT
Start: 2020-07-02 | End: 2020-07-07

## 2020-07-02 RX ORDER — HYDROCODONE BITARTRATE AND ACETAMINOPHEN 5; 325 MG/1; MG/1
1 TABLET ORAL EVERY 6 HOURS PRN
Status: DISCONTINUED | OUTPATIENT
Start: 2020-07-02 | End: 2020-07-05

## 2020-07-02 RX ORDER — MORPHINE SULFATE 2 MG/ML
2 INJECTION, SOLUTION INTRAMUSCULAR; INTRAVENOUS EVERY 4 HOURS PRN
Status: COMPLETED | OUTPATIENT
Start: 2020-07-02 | End: 2020-07-02

## 2020-07-02 RX ORDER — FUROSEMIDE 10 MG/ML
40 INJECTION INTRAMUSCULAR; INTRAVENOUS ONCE
Status: COMPLETED | OUTPATIENT
Start: 2020-07-02 | End: 2020-07-02

## 2020-07-02 RX ORDER — IPRATROPIUM BROMIDE AND ALBUTEROL SULFATE 2.5; .5 MG/3ML; MG/3ML
1 SOLUTION RESPIRATORY (INHALATION)
Status: DISCONTINUED | OUTPATIENT
Start: 2020-07-02 | End: 2020-07-10

## 2020-07-02 RX ORDER — MORPHINE SULFATE 2 MG/ML
2 INJECTION, SOLUTION INTRAMUSCULAR; INTRAVENOUS ONCE
Status: COMPLETED | OUTPATIENT
Start: 2020-07-02 | End: 2020-07-02

## 2020-07-02 RX ADMIN — ACETAMINOPHEN 650 MG: 325 TABLET ORAL at 07:53

## 2020-07-02 RX ADMIN — FUROSEMIDE 40 MG: 10 INJECTION, SOLUTION INTRAMUSCULAR; INTRAVENOUS at 12:16

## 2020-07-02 RX ADMIN — Medication 10 ML: at 13:25

## 2020-07-02 RX ADMIN — SODIUM CHLORIDE, PRESERVATIVE FREE 10 ML: 5 INJECTION INTRAVENOUS at 12:17

## 2020-07-02 RX ADMIN — IPRATROPIUM BROMIDE AND ALBUTEROL SULFATE 1 AMPULE: .5; 3 SOLUTION RESPIRATORY (INHALATION) at 14:18

## 2020-07-02 RX ADMIN — TAMSULOSIN HYDROCHLORIDE 0.4 MG: 0.4 CAPSULE ORAL at 10:09

## 2020-07-02 RX ADMIN — METOPROLOL SUCCINATE 50 MG: 50 TABLET, EXTENDED RELEASE ORAL at 10:10

## 2020-07-02 RX ADMIN — DIVALPROEX SODIUM 250 MG: 125 CAPSULE, COATED PELLETS ORAL at 20:06

## 2020-07-02 RX ADMIN — HYDROCODONE BITARTRATE AND ACETAMINOPHEN 1 TABLET: 5; 325 TABLET ORAL at 10:09

## 2020-07-02 RX ADMIN — MORPHINE SULFATE 2 MG: 2 INJECTION, SOLUTION INTRAMUSCULAR; INTRAVENOUS at 13:24

## 2020-07-02 RX ADMIN — KIT FOR THE PREPARATION OF TECHNETIUM TC 99M ALBUMIN AGGREGATED 6 MILLICURIE: 2.5 INJECTION, POWDER, FOR SOLUTION INTRAVENOUS at 10:29

## 2020-07-02 RX ADMIN — IPRATROPIUM BROMIDE AND ALBUTEROL SULFATE 1 AMPULE: .5; 3 SOLUTION RESPIRATORY (INHALATION) at 20:03

## 2020-07-02 RX ADMIN — HYDROCODONE BITARTRATE AND ACETAMINOPHEN 1 TABLET: 5; 325 TABLET ORAL at 01:45

## 2020-07-02 RX ADMIN — MORPHINE SULFATE 2 MG: 2 INJECTION, SOLUTION INTRAMUSCULAR; INTRAVENOUS at 03:11

## 2020-07-02 RX ADMIN — OYSTER SHELL CALCIUM WITH VITAMIN D 1 TABLET: 500; 200 TABLET, FILM COATED ORAL at 10:10

## 2020-07-02 RX ADMIN — AMIODARONE HYDROCHLORIDE 200 MG: 200 TABLET ORAL at 12:16

## 2020-07-02 RX ADMIN — IPRATROPIUM BROMIDE AND ALBUTEROL SULFATE 1 AMPULE: .5; 3 SOLUTION RESPIRATORY (INHALATION) at 10:53

## 2020-07-02 RX ADMIN — MORPHINE SULFATE 2 MG: 2 INJECTION, SOLUTION INTRAMUSCULAR; INTRAVENOUS at 06:32

## 2020-07-02 RX ADMIN — GABAPENTIN 100 MG: 100 CAPSULE ORAL at 20:06

## 2020-07-02 RX ADMIN — GABAPENTIN 100 MG: 100 CAPSULE ORAL at 10:09

## 2020-07-02 RX ADMIN — SODIUM CHLORIDE, PRESERVATIVE FREE 10 ML: 5 INJECTION INTRAVENOUS at 20:06

## 2020-07-02 RX ADMIN — FUROSEMIDE 40 MG: 10 INJECTION, SOLUTION INTRAMUSCULAR; INTRAVENOUS at 18:22

## 2020-07-02 RX ADMIN — AMITRIPTYLINE HYDROCHLORIDE 12.5 MG: 25 TABLET, FILM COATED ORAL at 20:06

## 2020-07-02 RX ADMIN — POLYVINYL ALCOHOL 1 DROP: 14 SOLUTION/ DROPS OPHTHALMIC at 20:03

## 2020-07-02 RX ADMIN — HYDROCODONE BITARTRATE AND ACETAMINOPHEN 1 TABLET: 5; 325 TABLET ORAL at 18:22

## 2020-07-02 RX ADMIN — Medication 4.5 MG: at 20:07

## 2020-07-02 ASSESSMENT — PAIN DESCRIPTION - DESCRIPTORS
DESCRIPTORS: ACHING

## 2020-07-02 ASSESSMENT — PAIN DESCRIPTION - ORIENTATION
ORIENTATION: RIGHT

## 2020-07-02 ASSESSMENT — PAIN SCALES - GENERAL
PAINLEVEL_OUTOF10: 9
PAINLEVEL_OUTOF10: 6
PAINLEVEL_OUTOF10: 5
PAINLEVEL_OUTOF10: 9
PAINLEVEL_OUTOF10: 3
PAINLEVEL_OUTOF10: 4
PAINLEVEL_OUTOF10: 8
PAINLEVEL_OUTOF10: 9
PAINLEVEL_OUTOF10: 7
PAINLEVEL_OUTOF10: 8
PAINLEVEL_OUTOF10: 7
PAINLEVEL_OUTOF10: 4
PAINLEVEL_OUTOF10: 3

## 2020-07-02 ASSESSMENT — PAIN DESCRIPTION - ONSET
ONSET: ON-GOING

## 2020-07-02 ASSESSMENT — PAIN DESCRIPTION - FREQUENCY
FREQUENCY: CONTINUOUS

## 2020-07-02 ASSESSMENT — PAIN DESCRIPTION - PAIN TYPE
TYPE: ACUTE PAIN

## 2020-07-02 ASSESSMENT — PAIN DESCRIPTION - LOCATION
LOCATION: HIP

## 2020-07-02 ASSESSMENT — PAIN DESCRIPTION - PROGRESSION
CLINICAL_PROGRESSION: GRADUALLY IMPROVING
CLINICAL_PROGRESSION: GRADUALLY IMPROVING
CLINICAL_PROGRESSION: GRADUALLY WORSENING
CLINICAL_PROGRESSION: GRADUALLY IMPROVING
CLINICAL_PROGRESSION: GRADUALLY WORSENING
CLINICAL_PROGRESSION: NOT CHANGED

## 2020-07-02 ASSESSMENT — PAIN - FUNCTIONAL ASSESSMENT

## 2020-07-02 NOTE — PROGRESS NOTES
PATIENT NAME:                     Remer Cowden  YOB: 1932   MEDICAL RECORD#         [id number]  SURGEON:                 Bill Bettencourt      CHIEF COMPLAINT: right hip pain. HISTORY OF PRESENT ILLNESS: Mr. Remer Cowden is a  80 y.o. male who reportedly slipped and fell at nursing home injuring his right hip. He was unable to bear weight on the affected extremity. He was first seen and evaluated in the emergency room , where he was x-rayed and Orthopedics was consulted. The pain is sharp and not radiating. No other complaints. The patient was COVID 19+ last week and he was on the [place] unit at [facility] when the fall occurred. He did have a rapid test last night which was negative. The patient reportedly desatted down to 30% this morning. PAST MEDICAL HISTORY:   Past Medical History:   Diagnosis Date    Alcohol abuse     Arthritis of left knee     advanced    Atrial fibrillation (HCC)     HTN (hypertension)     Hyperlipidemia     PAF (paroxysmal atrial fibrillation) (HCC)         MEDICATIONS: The patient's medication reconciliation form was extensively reviewed and noted.      ALLERGIES: No Known Allergies     SOCIAL HISTORY:   Social History     Socioeconomic History    Marital status:      Spouse name: Not on file    Number of children: Not on file    Years of education: Not on file    Highest education level: Not on file   Occupational History    Occupation: part time [address] resource strain: Not on file    Food insecurity     Worry: Not on file     Inability: Not on file   [place] [facility] needs     Medical: Not on file     Non-medical: Not on file   Tobacco Use    Smoking status: Never Smoker    Smokeless tobacco: Never Used   Substance and Sexual Activity    Alcohol use: Yes     Comment: 20 months sober     Drug use: No    Sexual activity: Not Currently   Lifestyle    Physical activity     Days per week: Not on file Minutes per session: Not on file    Stress: Not on file   Relationships    Social connections     Talks on phone: Not on file     Gets together: Not on file     Attends Alevism service: Not on file     Active member of club or organization: Not on file     Attends meetings of clubs or organizations: Not on file     Relationship status: Not on file    Intimate partner violence     Fear of current or ex partner: Not on file     Emotionally abused: Not on file     Physically abused: Not on file     Forced sexual activity: Not on file   Other Topics Concern    Not on file   Social History Narrative    Not on file        PAST SURGICAL HISTORY:   Past Surgical History:   Procedure Laterality Date    HIP FRACTURE SURGERY Left         REVIEW OF SYSTEMS: Negative for fever, chills or night sweats. Review of   systems is negative except as mentioned in the history of present illness. FAMILY MEDICAL HISTORY:   Family History   Problem Relation Age of Onset    Other Mother     Other Father     Heart Disease Father         PHYSICAL EXAMINATION:   GENERAL: The patient is alert and oriented x3. VITAL SIGNS: Stable. He is afebrile. EXTREMITIES: The right lower extremity is shortened and externally rotated. The patient has severe pain with light log rolling of the right lower   extremity. The patient is neurovascularly intact in both lower extremities. Examination of the right upper extremity is unremarkable. Examination of the   left upper extremity is unremarkable. Examination of the contralateral lower   extremity is unremarkable. Examination of the skin reveals no lesions or ulcerations in the extremities. X-rays: An AP pelvis and lateral of the hip were reviewed. The x-rays were extensively reviewed. There is evidence of femoral head collapse at the superior lateral aspect of the femoral head consistent with avascular necrosis. There are cystic changes within the femoral head as well.   There is evidence of a new acute femoral neck fracture. 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 At this time the pulmonology team has been consulted by the hospitalist.  As long as the patient is cleared, I do feel he will benefit from hemiarthroplasty as soon as possible. The longer he is in bed, the more likely his pulmonary disease will worsen. All risks including but not limited to blood loss, infection, persistent pain,stiffness, weakness, hardware failure, deep vein thrombosis, dislocation,  neurovascular injury, and the risks of anesthesia were discussed. The patient/family understands all risks and benefits of the procedure and agrees to proceed.

## 2020-07-02 NOTE — PROGRESS NOTES
CXR with RLL opacity and vascular congestion. Needs non-contrasted CT to define airspace disease.   If felt to be related to pulmonary contusion may need to hold off on surgery for now    Ann Spicer DO  [facility]  [phone]

## 2020-07-02 NOTE — PROGRESS NOTES
Patient arrived to room 21489 [phone] from [facility] ED. Patient is A/Ox4. Oriented patient to room and call light. Fall assessment completed. Patient denies any further needs at this time, will continue to monitor and assess for needs and comfort.

## 2020-07-02 NOTE — PROGRESS NOTES
Tele cam requested for patient as he repeatedly removes his oxygen, O2 sensor, and tele leads despite frequent redirection. Waiting for call back from [facility] regarding the availability of a tele camera.

## 2020-07-02 NOTE — PROGRESS NOTES
Patient surgery for today canceled per Dr. Effie Stout r/t respiratory distress this morning and continued demand for 12L high flow O2. Patient aware of canceled surgery. Diet order obtained. Pre op alerted to canceled surgery.

## 2020-07-02 NOTE — PROGRESS NOTES
Reviewed CXR, pulm congestion. IV lasix 40 mg X1 ordered. V/Q scan pending. Per pulm, if VQ scan returns with low probability for PE, OK to proceed with surgery. If V/Q scan returns with high probability for PE, may need to hold on surgery. Perfectserve message sent to Dr. Venita Christina.

## 2020-07-02 NOTE — PROGRESS NOTES
Intended to ordere VQ scan but it appears only the perfusion testing was done, without ventilation part. CT-chest with pleural effusion. Has IV lasix ordered, pending to be given. Will place on scheduled IV lasix. COVID-19 PCR requested by nursing staff - it will be positive and does not indicate acute infection, unless it will be reported as IgG vs igM. Patient recently had COVID-19 diagnosis. He had a negative rapid yesterday. Repeating a COVID-19 PCR today is only going to provide an expected result; hence, the test is not clinically necessary at this time. D/w nursing staff.

## 2020-07-02 NOTE — PROGRESS NOTES
[facility] Orthopedic Surgery   Progress Note      S/P :  SUBJECTIVE  In bed. Alert and oriented to self and situation. SOB this AM with low SaO2 and now on NRB mask. Hospitalist to see pt. Pt states severe low back pain this AM and right hip pain. Pain is   described in right hip and groin and low back and with the intensity of severe. Pain is described as aching, pressure. OBJECTIVE              Physical                      VITALS:  BP (!) 132/49   Pulse 83   Temp 98.5 °F (36.9 °C) (Oral)   Resp 14   Ht 5' 8\" (1.727 m)   Wt 145 lb 1 oz (65.8 kg)   SpO2 93%   BMI 22.06 kg/m²                     MUSCULOSKELETAL:  right foot NVI. Wiggles toes to command. Pedal pulses are palpable. Right leg externally rotated and shortened. Grossly tender low back near sacrum, No abdominal tenderness or bruising noted. Laceration right forehead stable.                     NEUROLOGIC:                                  Sensory:  Touch:  Right Lower Extremity:  normal                                                  Data       CBC:   Lab Results   Component Value Date    WBC 16.4 07/02/2020    RBC 3.22 07/02/2020    HGB 10.1 07/02/2020    HCT 31.7 07/02/2020    MCV 98.4 07/02/2020    MCH 31.3 07/02/2020    MCHC 31.8 07/02/2020    RDW 15.7 07/02/2020     07/02/2020    MPV 7.5 07/02/2020        WBC:    Lab Results   Component Value Date    WBC 16.4 07/02/2020        Hemoglobin/Hematocrit:    Lab Results   Component Value Date    HGB 10.1 07/02/2020    HCT 31.7 07/02/2020        PT/INR:    Lab Results   Component Value Date    PROTIME 13.1 06/17/2018    INR 1.15 06/17/2018              Current Inpatient Medications             Current Facility-Administered Medications: HYDROcodone-acetaminophen (NORCO) 5-325 MG per tablet 1 tablet, 1 tablet, Oral, Q6H PRN  morphine (PF) injection 2 mg, 2 mg, Intravenous, Q4H PRN  amiodarone (CORDARONE) tablet 200 mg, 200 mg, Oral, Daily  amitriptyline (ELAVIL) tablet 12.5 mg, 12.5 mg, Oral, Nightly  calcium-vitamin D 500-200 MG-UNIT per tablet 1 tablet, 1 tablet, Oral, Daily with breakfast  divalproex (DEPAKOTE SPRINKLE) capsule 250 mg, 250 mg, Oral, Nightly  gabapentin (NEURONTIN) capsule 100 mg, 100 mg, Oral, BID  polyvinyl alcohol (LIQUIFILM TEARS) 1.4 % ophthalmic solution 1 drop, 1 drop, Both Eyes, BID  melatonin tablet 4.5 mg, 4.5 mg, Oral, Nightly  metoprolol succinate (TOPROL XL) extended release tablet 50 mg, 50 mg, Oral, Daily  tamsulosin (FLOMAX) capsule 0.4 mg, 0.4 mg, Oral, Daily  sodium chloride flush 0.9 % injection 10 mL, 10 mL, Intravenous, 2 times per day  sodium chloride flush 0.9 % injection 10 mL, 10 mL, Intravenous, PRN  acetaminophen (TYLENOL) tablet 650 mg, 650 mg, Oral, Q6H PRN **OR** acetaminophen (TYLENOL) suppository 650 mg, 650 mg, Rectal, Q6H PRN  polyethylene glycol (GLYCOLAX) packet 17 g, 17 g, Oral, Daily PRN  promethazine (PHENERGAN) tablet 12.5 mg, 12.5 mg, Oral, Q6H PRN **OR** ondansetron (ZOFRAN) injection 4 mg, 4 mg, Intravenous, Q6H PRN  enoxaparin (LOVENOX) injection 40 mg, 40 mg, Subcutaneous, Daily  potassium chloride (KLOR-CON M) extended release tablet 40 mEq, 40 mEq, Oral, PRN **OR** potassium bicarb-citric acid (EFFER-K) effervescent tablet 40 mEq, 40 mEq, Oral, PRN **OR** potassium chloride 10 mEq/100 mL IVPB (Peripheral Line), 10 mEq, Intravenous, PRN  magnesium sulfate 2 g in 50 mL IVPB premix, 2 g, Intravenous, PRN    ASSESSMENT AND PLAN    Fall  Right hip pain  Right subcapital hip fx  ? Bony lesions right hip, consider bx intraop. Discussed with Dr Selwyn Ibarra, plan right hip surgery today if medically cleared  and now on NRB O2. Hospitalist in room currently  NPO   If does not have hip surgery today please apply 5# [place] tx to right leg for pain relief.          [facility]  7/2/2020  8:07 AM

## 2020-07-02 NOTE — CARE COORDINATION
Corey reviewed patient's chart. He is from [facility]. Osvaldo Been at [facility] confirmed he is from long term care but was ambulatory before. She reported he will need a PT/OT eval for skilled needs at [facility]. Patient [place] also [place] [facility] [place]. Corey spoke with patient's primary contact daughter, Alissa Elise ([phone]). She confirmed above plan.      Electronically signed by Miguelito Kelly on 7/2/2020 at 11:52 AM

## 2020-07-02 NOTE — PROGRESS NOTES
Hospital Medicine Progress Note     Date:  7/2/2020    PCP: Bebe Hernández MD (Tel: None)    Date of Admission: 7/1/2020    Chief complaint:   Chief Complaint   Patient presents with    Fall     right hip pain and head laceration from fall today at [facility].  pt caught feet in blanket and fell getting ouit of bed       Brief hospital course: Admitted with right hip fracture following fall at nursing home. Assessment/plan:  1. Right hip fracture. Secondary to fall at nursing home. Given significant hypoxia this AM, will consult pulm for input to determine if safe for surgery. On DVT ppx, pain meds. 2. Back pain. Imaging pending per d/w ortho NP. 3. Acute respiratory failure with hypoxia. Check CXR, ABG. Stat VQ scan. Recent covid19 last week per d/w nursing staff. Pulm consult. 4. Paroxysmal atrial fibrillation. Full dose anticoagulation on hold. On amio. 5. Essential hypertension. On BP meds. 6. Hx of hyperlipidemia. Diet: Diet NPO, After Midnight    Code status: DNR-CCA   ----------  Subjective  Reports low back pain. Hypoxic to 36% this AM, per nurse. Currently on NRFM. Objective  Physical exam:  Vitals: BP (!) 132/49   Pulse 83   Temp 98.5 °F (36.9 °C) (Oral)   Resp 20   Ht 5' 8\" (1.727 m)   Wt 145 lb 1 oz (65.8 kg)   SpO2 93%   BMI 22.06 kg/m²   Gen/overall appearance: Not in acute distress. Alert. Oriented X3  Head: Normocephalic, atraumatic  Eyes: EOMI, good acuity  ENT: Oral mucosa moist  Neck: No JVD, thyromegaly  CVS: Nml S1S2, no MRG, RRR  Pulm: Clear bilaterally. No crackles/wheezes  Gastrointestinal: Soft, NT/ND, +BS  Musculoskeletal: Bilateral LE lymphedema. Warm  Neuro: No focal deficit. Moves extremity spontaneously. Psychiatry: Appropriate affect. Not agitated. Skin: Warm, dry with normal turgor.  No rash  Capillary refill: Brisk,< 3 seconds   Peripheral Pulses: +2 palpable, equal bilaterally      24HR INTAKE/OUTPUT:      Intake/Output Summary (Last 24 hours) at 7/2/2020

## 2020-07-02 NOTE — CONSULTS
PATIENT IS SEEN AT THE REQUEST OF DR. Whipple for recent COVID +, hypoxia     CONSULTING PHYSICIAN: Sole     HISTORY OF PRESENT ILLNESS:  This is a 80 y.o. male who presented to the ED on 7/1 with a CC of fall. Per ED notes patient fell while trying to get out of bed. He hit his hip and face on the floor. He was admitted for hip fracture. Apparently his oxygen requirements have increased since this fracture with profound desaturation  He is currently on 15 liters of oxygen. He denies being SOB and denies having lung disease. He was not even aware he had COVID recently. He did test negative since admission. Main complaint is hip pain    Established Pulmonologist:  None    PAST MEDICAL HISTORY:  Past Medical History:   Diagnosis Date    Alcohol abuse     Arthritis of left knee     advanced    Atrial fibrillation (HCC)     HTN (hypertension)     Hyperlipidemia     PAF (paroxysmal atrial fibrillation) (HCC)        PAST SURGICAL HISTORY:  Past Surgical History:   Procedure Laterality Date    HIP FRACTURE SURGERY Left        FAMILY HISTORY:  family history includes Heart Disease in his father; Other in his father and mother. SOCIAL HISTORY:   reports that he has never smoked.  He has never used smokeless tobacco.    Scheduled Meds:   amiodarone  200 mg Oral Daily    amitriptyline  12.5 mg Oral Nightly    calcium-vitamin D  1 tablet Oral Daily with breakfast    divalproex  250 mg Oral Nightly    gabapentin  100 mg Oral BID    polyvinyl alcohol  1 drop Both Eyes BID    melatonin  4.5 mg Oral Nightly    metoprolol succinate  50 mg Oral Daily    tamsulosin  0.4 mg Oral Daily    sodium chloride flush  10 mL Intravenous 2 times per day    enoxaparin  40 mg Subcutaneous Daily       Continuous Infusions:      PRN Meds:  HYDROcodone 5 mg - acetaminophen, morphine, sodium chloride flush, acetaminophen **OR** acetaminophen, polyethylene glycol, promethazine **OR** ondansetron, potassium chloride **OR** potassium alternative oral replacement **OR** potassium chloride, magnesium sulfate    ALLERGIES:  Patient has No Known Allergies. REVIEW OF SYSTEMS:  Constitutional: does not feel sick  HENT: Negative for sore throat  Eyes: Negative for redness   Respiratory: mild cough  Cardiovascular: Negative for chest pain  Gastrointestinal: Negative for vomiting, diarrhea   Genitourinary: Negative for hematuria, negative for dysuria  Musculoskeletal: right arm pain, right hip pain  Skin: Negative for rash  Neurological: Recent fall   Hematological: Negative for adenopathy  Extremities:  Hip fracture    PHYSICAL EXAM:  Blood pressure (!) 132/49, pulse 83, temperature 98.5 °F (36.9 °C), temperature source Oral, resp. rate 20, height 5' 8\" (1.727 m), weight 145 lb 1 oz (65.8 kg), SpO2 93 %.'  Gen: No distress. Pale appearing  Eyes: PERRL. No sclera icterus. No conjunctival injection. ENT: No discharge. Pharynx clear. Neck: Trachea midline. No obvious mass. Resp: Few isolated crackles, good air movement  CV: Regular rate. Regular rhythm. No murmur or rub. GI: Non-tender. Non-distended. No hernia. BS present. Skin: Pale, cool. Cold fingers  Lymph: No cervical LAD. No supraclavicular LAD. M/S: right leg in soft cast, right leg in traction  Neuro: Awake. Alert. Moves all four extremities. EXT:   trace edema, no clubbing    LABS:  CBC:   Recent Labs     07/01/20  1701 07/02/20  0447   WBC 10.7 16.4*   HGB 9.4* 10.1*   HCT 28.6* 31.7*   MCV 96.8 98.4    429     BMP:   Recent Labs     07/01/20  1701 07/02/20  0447    140   K 4.6 4.8    105   CO2 23 18*   BUN 17 20   CREATININE 1.4* 1.6*     LIVER PROFILE: No results for input(s): AST, ALT, LIPASE, BILIDIR, BILITOT, ALKPHOS in the last 72 hours. Invalid input(s): AMYLASE,  ALB  PT/INR: No results for input(s): PROTIME, INR in the last 72 hours. APTT: No results for input(s): APTT in the last 72 hours.   UA:  Recent Labs     07/01/20  1701   COLORU YELLOW   PHUR 7.0   WBCUA 1   RBCUA 1   CLARITYU Clear   SPECGRAV 1.013   LEUKOCYTESUR TRACE*   UROBILINOGEN 2.0*   BILIRUBINUR Negative   BLOODU Negative   GLUCOSEU Negative     No results for input(s): PHART, XSZ6EYN, PO2ART in the last 72 hours. Cultures:  None    PFTs:  None       ECHO: 2018  Normal left ventricle size, wall thickness, and systolic function with an   estimated ejection fraction of 55-60%. No regional wall motion abnormalities are seen. Mild mitral regurgitation is present. Mitral valve is structurally normal.   The left atrium is mildly dilated. The right atrium is mildly dilated. AB    Chest X-ray:  Pending    I reviewed all the above labs and studies pertaining to this visit. ASSESSMENT:  · Acute Hypoxic Respiratory Failure with saturations less than 90% on room air. Concern would be for pneumothorax, atelectasis and/or PE  · Recent COVID 19 infection which may have nothing to to with current presentation   · Right Hip fracture  · CKD    PLAN:  Titrate oxygen for saturations greater than or equal to 90%  · ABG reviewed, confirming hypoxia  · CXR pending. Might benefit from non-contrasted CT Chest   · V/Q pending  · Start EZPAP and IS  · Duonebs q 6 hours  · DVT prophylaxis with lovenox    There is nothing to say he could not have surgery. If his hypoxia was due to atelectasis, positive pressure with surgery would only help and not hurt. If due to PE might want to hold off. Clinically he does not endorse SOB despite requiring 15 liters.       Jody Pack,   [facility]

## 2020-07-02 NOTE — PROGRESS NOTES
Patient desaturating on 7L high flow O2, per respiratory therapy recommendation, patient is back on 15L of high flow O2. Oxygen saturation 94%. Tele monitor and continuous pulse ox applied.

## 2020-07-02 NOTE — PROGRESS NOTES
Tele camera placed into patient room to prevent patient from removing tele box/ oxygen tubing. Patient educated on camera use and nods when asked if he understands.

## 2020-07-02 NOTE — PROGRESS NOTES
Occupational Therapy  Status Note    Toyin Alex  7/2/2020  [id number]    OT orders noted. Per chart review pt admitted for R hip pain and found to have R subcapital hip fracture. Plan is for OR today for R hip hemiarthroplasty. Will cancel current orders and await new post-op orders with weight bearing allowances.      Electronically signed by Gerhardt Marseille, OTR/L#[id number] on 7/2/2020 at 7:51 AM

## 2020-07-02 NOTE — DISCHARGE INSTR - COC
[facility] ([facility]) Continuity of Care Form    Patient Name:  Kvng Zhu  : 1932    MRN:  [id number]    Admit date:  2020  Discharge date:  7/10/2020    Code Status Order: DNR-CCA  Advance Directives: Yes    Admitting Physician: Silvana Hammer MD  PCP: Danyell Silvestre MD    Discharging Nurse: [facility] Unit/Room#: N4W-6346/3112-01  Discharging Unit Phone Number: [phone]    Emergency Contact:        Past Surgical History:  Past Surgical History:   Procedure Laterality Date    HIP FRACTURE SURGERY Left     HIP SURGERY Right 2020    RIGHT HIP HEMIARTHROPLASTY performed by Arvin Mccoy MD at [place] [address]       Immunization History:   Immunization History   Administered Date(s) Administered    Influenza Virus Vaccine 2014, 10/23/2015    Influenza, High Dose (Fluzone 65 yrs and older) 2013    Zoster Live (Zostavax) 2011       Active Problems:  Active Problems:    Closed fracture of right hip ([facility]    Acute respiratory failure with hypoxia ([facility]    COVID-19  Resolved Problems:    * No resolved hospital problems. *      Isolation/Infection:       Nurse Assessment:  Last Vital Signs:BP (!) 151/69   Pulse 77   Temp 97.7 °F (36.5 °C) (Oral)   Resp 16   Ht 5' 8\" (1.727 m)   Wt 145 lb 11.6 oz (66.1 kg)   SpO2 91%   BMI 22.16 kg/m²   Last documented pain score (0-10 scale): Pain Level: 7  Last Weight:   Wt Readings from Last 1 Encounters:   07/10/20 145 lb 11.6 oz (66.1 kg)     Mental Status:  oriented and alert x3, confused to date/time, intermittent periods of confusion     IV Access:  - None    Nursing Mobility/ADLs:  Walking   Dependent  Transfer  Assisted  Bathing  Assisted  Dressing  Assisted  Toileting  Assisted  Feeding  Independent  Med Admin  Independent  Med Delivery   whole    Wound Care Documentation and Therapy:  Keep glued Prineo dressing clean and intact. DO NOT remove. This is waterproof for showering. Please do not use lotion on dressing.   The the same time of day. Report weight gain of 3 pounds/day or 5 pounds/week to : [facility] MD, Zuri Marsh [phone] and [facility] [phone].  Please use hospital discharge weight as baseline reference.  Please monitor for signs and symptoms of and report to MD:  o Worsening Heart Failure: sudden weight gain, shortness of breath, lower extremity or general edema/swelling, abdominal bloating/swelling, inability to lie flat, intolerance to usual activity, or cough (especially at night). Report these finding even if no increase in weight.  o Dehydration:  having difficulty or a decrease in urination, dizziness, worsening fatigue, or new onset/worsening of generalized weakness.  Please continue a LOW SODIUM diet and LIMIT fluid intake to 48 - 64 ounces ( 1.5 - 2 liters) per day.  Call Zuri Marsh [phone], [facility] and [facility] [phone] and/or Morgan Gardner @ [phone] with any questions or concerns.  Please continue heart failure education to patient and family/support system.  See After Visit Summary for hospital follow up appointment details. PLEASE NOTE PATIENT HAS A CARDIOLOGY APPT SCHEDULED IN [address] [place] [address] [phone].  Consider spiritual care referral for support and/or completion of advance directives [phone].  Consider: having the facility MD complete required 7 day follow up, palliative care consult for ongoing goals of care, end of life, and/or chronic disease management discussions and referral to [facility] ([phone]) once SNF/HHC complete.         Patient's personal belongings (please select all that are sent with patient):  Shravan    RN SIGNATURE:  Electronically signed by Margoth Zamora RN on 7/10/2020 at 12:26 PM      PHYSICIAN SECTION    Prognosis: Good    Condition at Discharge: Stable    Rehab Potential (if transferring to Rehab): rolling over. A pillow or triangular-shaped wedge may be used to block the legs from crossing. Don't roll your leg and foot in. Use a pillow between your legs when lying in bed to keep your leg from rolling inward. Don't allow the knee of your operated leg to cross the midline of your body. This means don't let your knee move across your body past your navel (belly button). When lying in bed, place pillows between your legs to keep your hip in the correct position. Don't turn your upper body toward your sore hip. When sitting, swivel your whole body rather than turning your upper body toward your hip. Don't twist your body toward your operated hip. This means don't stand pigeon-toed. Keep the toes of your affected leg pointed forward when you stand, sit, or walk. If you turn your body in the direction of your surgical hip without pivoting your foot, your hip will be placed in an unsafe position. Remember to lift and turn your foot as you turn in the same direction as your surgical hip. Don't bend the hip past ninety degrees. This means do not lean too far forward when sitting up in bed. Also, raising your knee up in bed can cause the hip angle to go past ninety degrees. To avoid bending past ninety degrees when sitting in a chair, lean back slightly. Don't bend over past ninety degrees at the waist. Your hip may go past ninety degrees if you bend over at the waist to tie your shoes or  items off the floor. Instead, use a reacher to put on your shoes and socks or to  items from the floor.

## 2020-07-02 NOTE — PLAN OF CARE
Problem: Skin Integrity:  Goal: Will show no infection signs and symptoms  Description: Will show no infection signs and symptoms  Outcome: Ongoing  Note: Patient remains free from new signs and symptoms of infection during this shift. Infection prevention measures are in place. Will continue to monitor for alterations in patient condition throughout the shift. Goal: Absence of new skin breakdown  Description: Absence of new skin breakdown  Outcome: Ongoing  Note: Patient skin condition and mucus membrane integrity remain unchanged during this shift. Skin breakdown prevention interventions are in place. Will continue to monitor and assess. Problem: Falls - Risk of:  Goal: Will remain free from falls  Description: Will remain free from falls  Outcome: Ongoing  Note: Patient remains free from falls during this shift. Fall precautions remain in place. Patient educated on the need to implement call light use prior to ambulation. Will continue to monitor and assess. Problem: Pain:  Goal: Pain level will decrease  Description: Pain level will decrease  Outcome: Ongoing  Note: Pain managed with pharmacologic and non-pharmacologic interventions during this shift. Will continue to monitor and assess for needs and change in patient condition.

## 2020-07-02 NOTE — CARE COORDINATION
Pt lives in [address] [place] [address] at [facility]. His bed is being held. He will require [facility] Pre-cert prior to D/C. [facility] is no longer a notification process. Will need PT/OT evals to fax for Approval.  He will not need HENS/PAS at discharge.   Cheryl Caruthers [phone]  Electronically signed by Elisa Ventura on 7/2/2020 at 10:08 AM

## 2020-07-02 NOTE — PROGRESS NOTES
CT Chest reviewed. Shows moderate effusions bilaterally with atelectasis and pulmonary edema. Diuresis should help. If can get down to 6 liters of oxygen probably ok for surgery. Positive pressure will help his lungs during surgery but then again an Anesthesiologist may not clear patient for surgery.   ECHO will be needed at some point     Ludmila Bhatia DO  [place] [facility]  [phone]

## 2020-07-02 NOTE — PROGRESS NOTES
PCA taking patient vitals, noted oxygen saturation to be 36% on 2L. This RN called to the room. Patient oxygen increased to 6L via nasal cannula. Oxygen saturation increased to 60%. This RN called Allegra Arguello RT and charge nurse Radha Alcantar regarding patient condition. Patient placed on a non-rebreather at 15L. Patient O2 saturation increased to 94%. This RN, RT, charge nurse, and Dr. Eduardo Louis at the bedside. Per Dr. Eduardo Louis, patient tentatively to have surgery at [phone] today. Patient denies SOB but breathing appears slightly labored. Unit manager Richardson Landau aware of patient condition as well. Urgent perfect serve sent to Dr. Yesy Coronado regarding patient condition. No response received at this time. Will continue to monitor and assess.

## 2020-07-02 NOTE — PLAN OF CARE
Problem: Skin Integrity:  Goal: Will show no infection signs and symptoms  Description: Will show no infection signs and symptoms  Outcome: Ongoing  Note: Patient is alert and oriented, afebrile, has no complaints of pain, skin is intact and appropriate for ethnicity in color    Goal: Absence of new skin breakdown  Description: Absence of new skin breakdown  Outcome: Ongoing  Note: Viet score assessed. Patient able to unambulate and turn self and repositioned patient Q2H and assessed skin. Educated patient on importance of repositioning to prevent skin issues. Problem: Falls - Risk of:  Goal: Will remain free from falls  Description: Will remain free from falls  Outcome: Ongoing  Note: Fall risk assessment completed . Fall precautions in place, bed alarm on, side rails 2/4 up, call light in reach, educated pt on calling for assistance when needed, room clear of clutter. Pt verbalized understanding. Problem: Pain:  Goal: Pain level will decrease  Description: Pain level will decrease  Outcome: Ongoing  Note: Pain /discomfort being managed with PRN analgesics per MD orders. Patient able to express presence and absence of pain and rate pain appropriately using numerical scale.

## 2020-07-02 NOTE — PROGRESS NOTES
Surgical consent obtained for right hip hemiarthroplasty with Dr. Douglas Glass from patient daughter/POA Beth Jackson ([phone]). Consent confirmed by second RN Georgina Bashir. All questions posed to this RN were answered.

## 2020-07-02 NOTE — PROGRESS NOTES
Patient down to 7L of high flow O2 via nasal cannula. Oxygen saturation staying > 90%. Patient denies SOB. Will continue to monitor and assess.

## 2020-07-03 ENCOUNTER — APPOINTMENT (OUTPATIENT)
Dept: GENERAL RADIOLOGY | Age: 85
DRG: 469 | End: 2020-07-03
Payer: MEDICARE

## 2020-07-03 LAB
A/G RATIO: 1.2 (ref 1.1–2.2)
ALBUMIN SERPL-MCNC: 3.4 G/DL (ref 3.4–5)
ALP BLD-CCNC: 74 U/L (ref 40–129)
ALT SERPL-CCNC: 8 U/L (ref 10–40)
ANION GAP SERPL CALCULATED.3IONS-SCNC: 18 MMOL/L (ref 3–16)
AST SERPL-CCNC: 22 U/L (ref 15–37)
BASOPHILS ABSOLUTE: 0.1 K/UL (ref 0–0.2)
BASOPHILS RELATIVE PERCENT: 0.4 %
BILIRUB SERPL-MCNC: 0.7 MG/DL (ref 0–1)
BUN BLDV-MCNC: 24 MG/DL (ref 7–20)
CALCIUM SERPL-MCNC: 8.6 MG/DL (ref 8.3–10.6)
CHLORIDE BLD-SCNC: 102 MMOL/L (ref 99–110)
CO2: 19 MMOL/L (ref 21–32)
CREAT SERPL-MCNC: 1.7 MG/DL (ref 0.8–1.3)
EOSINOPHILS ABSOLUTE: 0.2 K/UL (ref 0–0.6)
EOSINOPHILS RELATIVE PERCENT: 1.2 %
GFR AFRICAN AMERICAN: 46
GFR NON-AFRICAN AMERICAN: 38
GLOBULIN: 2.8 G/DL
GLUCOSE BLD-MCNC: 103 MG/DL (ref 70–99)
HCT VFR BLD CALC: 30.8 % (ref 40.5–52.5)
HEMOGLOBIN: 9.7 G/DL (ref 13.5–17.5)
LV EF: 63 %
LVEF MODALITY: NORMAL
LYMPHOCYTES ABSOLUTE: 0.8 K/UL (ref 1–5.1)
LYMPHOCYTES RELATIVE PERCENT: 4.2 %
MAGNESIUM: 2 MG/DL (ref 1.8–2.4)
MCH RBC QN AUTO: 31.5 PG (ref 26–34)
MCHC RBC AUTO-ENTMCNC: 31.6 G/DL (ref 31–36)
MCV RBC AUTO: 99.7 FL (ref 80–100)
MONOCYTES ABSOLUTE: 1.4 K/UL (ref 0–1.3)
MONOCYTES RELATIVE PERCENT: 7.6 %
NEUTROPHILS ABSOLUTE: 16.2 K/UL (ref 1.7–7.7)
NEUTROPHILS RELATIVE PERCENT: 86.6 %
PDW BLD-RTO: 16 % (ref 12.4–15.4)
PHOSPHORUS: 4.1 MG/DL (ref 2.5–4.9)
PLATELET # BLD: 349 K/UL (ref 135–450)
PMV BLD AUTO: 7.2 FL (ref 5–10.5)
POTASSIUM SERPL-SCNC: 4.7 MMOL/L (ref 3.5–5.1)
PRO-BNP: ABNORMAL PG/ML (ref 0–449)
PROCALCITONIN: 0.8 NG/ML (ref 0–0.15)
RBC # BLD: 3.09 M/UL (ref 4.2–5.9)
SODIUM BLD-SCNC: 139 MMOL/L (ref 136–145)
TOTAL PROTEIN: 6.2 G/DL (ref 6.4–8.2)
WBC # BLD: 18.7 K/UL (ref 4–11)

## 2020-07-03 PROCEDURE — 94761 N-INVAS EAR/PLS OXIMETRY MLT: CPT

## 2020-07-03 PROCEDURE — 93005 ELECTROCARDIOGRAM TRACING: CPT | Performed by: HOSPITALIST

## 2020-07-03 PROCEDURE — 94640 AIRWAY INHALATION TREATMENT: CPT

## 2020-07-03 PROCEDURE — 85025 COMPLETE CBC W/AUTO DIFF WBC: CPT

## 2020-07-03 PROCEDURE — 84100 ASSAY OF PHOSPHORUS: CPT

## 2020-07-03 PROCEDURE — 71045 X-RAY EXAM CHEST 1 VIEW: CPT

## 2020-07-03 PROCEDURE — 6370000000 HC RX 637 (ALT 250 FOR IP): Performed by: INTERNAL MEDICINE

## 2020-07-03 PROCEDURE — 1200000000 HC SEMI PRIVATE

## 2020-07-03 PROCEDURE — 83735 ASSAY OF MAGNESIUM: CPT

## 2020-07-03 PROCEDURE — 93306 TTE W/DOPPLER COMPLETE: CPT

## 2020-07-03 PROCEDURE — 6360000002 HC RX W HCPCS: Performed by: INTERNAL MEDICINE

## 2020-07-03 PROCEDURE — 6370000000 HC RX 637 (ALT 250 FOR IP): Performed by: NURSE PRACTITIONER

## 2020-07-03 PROCEDURE — 2580000003 HC RX 258: Performed by: INTERNAL MEDICINE

## 2020-07-03 PROCEDURE — 2700000000 HC OXYGEN THERAPY PER DAY

## 2020-07-03 PROCEDURE — 99233 SBSQ HOSP IP/OBS HIGH 50: CPT | Performed by: INTERNAL MEDICINE

## 2020-07-03 PROCEDURE — 94669 MECHANICAL CHEST WALL OSCILL: CPT

## 2020-07-03 PROCEDURE — 84145 PROCALCITONIN (PCT): CPT

## 2020-07-03 PROCEDURE — 83880 ASSAY OF NATRIURETIC PEPTIDE: CPT

## 2020-07-03 PROCEDURE — 80053 COMPREHEN METABOLIC PANEL: CPT

## 2020-07-03 RX ADMIN — FUROSEMIDE 40 MG: 10 INJECTION, SOLUTION INTRAMUSCULAR; INTRAVENOUS at 08:49

## 2020-07-03 RX ADMIN — POLYVINYL ALCOHOL 1 DROP: 14 SOLUTION/ DROPS OPHTHALMIC at 22:47

## 2020-07-03 RX ADMIN — ENOXAPARIN SODIUM 40 MG: 40 INJECTION SUBCUTANEOUS at 08:49

## 2020-07-03 RX ADMIN — HYDROCODONE BITARTRATE AND ACETAMINOPHEN 1 TABLET: 5; 325 TABLET ORAL at 08:49

## 2020-07-03 RX ADMIN — HYDROCODONE BITARTRATE AND ACETAMINOPHEN 1 TABLET: 5; 325 TABLET ORAL at 16:30

## 2020-07-03 RX ADMIN — Medication 10 ML: at 17:46

## 2020-07-03 RX ADMIN — FUROSEMIDE 40 MG: 10 INJECTION, SOLUTION INTRAMUSCULAR; INTRAVENOUS at 17:46

## 2020-07-03 RX ADMIN — AMIODARONE HYDROCHLORIDE 200 MG: 200 TABLET ORAL at 08:49

## 2020-07-03 RX ADMIN — CEFTRIAXONE 2 G: 2 INJECTION, POWDER, FOR SOLUTION INTRAMUSCULAR; INTRAVENOUS at 15:02

## 2020-07-03 RX ADMIN — HYDROCODONE BITARTRATE AND ACETAMINOPHEN 1 TABLET: 5; 325 TABLET ORAL at 00:00

## 2020-07-03 RX ADMIN — OYSTER SHELL CALCIUM WITH VITAMIN D 1 TABLET: 500; 200 TABLET, FILM COATED ORAL at 08:49

## 2020-07-03 RX ADMIN — SODIUM CHLORIDE, PRESERVATIVE FREE 10 ML: 5 INJECTION INTRAVENOUS at 22:47

## 2020-07-03 RX ADMIN — GABAPENTIN 100 MG: 100 CAPSULE ORAL at 08:49

## 2020-07-03 RX ADMIN — POLYVINYL ALCOHOL 1 DROP: 14 SOLUTION/ DROPS OPHTHALMIC at 08:49

## 2020-07-03 RX ADMIN — METOPROLOL SUCCINATE 50 MG: 50 TABLET, EXTENDED RELEASE ORAL at 08:49

## 2020-07-03 RX ADMIN — Medication 4.5 MG: at 22:46

## 2020-07-03 RX ADMIN — AZITHROMYCIN MONOHYDRATE 500 MG: 500 INJECTION, POWDER, LYOPHILIZED, FOR SOLUTION INTRAVENOUS at 13:38

## 2020-07-03 RX ADMIN — DIVALPROEX SODIUM 250 MG: 125 CAPSULE, COATED PELLETS ORAL at 22:46

## 2020-07-03 RX ADMIN — SODIUM CHLORIDE, PRESERVATIVE FREE 10 ML: 5 INJECTION INTRAVENOUS at 08:52

## 2020-07-03 RX ADMIN — IPRATROPIUM BROMIDE AND ALBUTEROL SULFATE 1 AMPULE: .5; 3 SOLUTION RESPIRATORY (INHALATION) at 12:36

## 2020-07-03 RX ADMIN — GABAPENTIN 100 MG: 100 CAPSULE ORAL at 22:46

## 2020-07-03 RX ADMIN — AMITRIPTYLINE HYDROCHLORIDE 12.5 MG: 25 TABLET, FILM COATED ORAL at 22:46

## 2020-07-03 RX ADMIN — TAMSULOSIN HYDROCHLORIDE 0.4 MG: 0.4 CAPSULE ORAL at 08:49

## 2020-07-03 RX ADMIN — IPRATROPIUM BROMIDE AND ALBUTEROL SULFATE 1 AMPULE: .5; 3 SOLUTION RESPIRATORY (INHALATION) at 07:36

## 2020-07-03 ASSESSMENT — PAIN DESCRIPTION - DESCRIPTORS
DESCRIPTORS: ACHING

## 2020-07-03 ASSESSMENT — PAIN DESCRIPTION - ORIENTATION
ORIENTATION: RIGHT

## 2020-07-03 ASSESSMENT — PAIN DESCRIPTION - FREQUENCY
FREQUENCY: CONTINUOUS

## 2020-07-03 ASSESSMENT — PAIN SCALES - GENERAL
PAINLEVEL_OUTOF10: 7
PAINLEVEL_OUTOF10: 4
PAINLEVEL_OUTOF10: 8
PAINLEVEL_OUTOF10: 8
PAINLEVEL_OUTOF10: 9

## 2020-07-03 ASSESSMENT — PAIN DESCRIPTION - ONSET
ONSET: ON-GOING

## 2020-07-03 ASSESSMENT — PAIN - FUNCTIONAL ASSESSMENT
PAIN_FUNCTIONAL_ASSESSMENT: PREVENTS OR INTERFERES SOME ACTIVE ACTIVITIES AND ADLS
PAIN_FUNCTIONAL_ASSESSMENT: PREVENTS OR INTERFERES WITH MANY ACTIVE NOT PASSIVE ACTIVITIES
PAIN_FUNCTIONAL_ASSESSMENT: PREVENTS OR INTERFERES SOME ACTIVE ACTIVITIES AND ADLS

## 2020-07-03 ASSESSMENT — PAIN DESCRIPTION - PROGRESSION
CLINICAL_PROGRESSION: GRADUALLY IMPROVING
CLINICAL_PROGRESSION: GRADUALLY WORSENING
CLINICAL_PROGRESSION: GRADUALLY WORSENING

## 2020-07-03 ASSESSMENT — PAIN DESCRIPTION - LOCATION
LOCATION: HIP

## 2020-07-03 ASSESSMENT — PAIN DESCRIPTION - PAIN TYPE
TYPE: ACUTE PAIN

## 2020-07-03 NOTE — PROGRESS NOTES
AAO2 No falls noted this shift. Bed kept in low position. Safe environment maintained. Bedside table & call light in reach. Uses call light appropriately when needing assistance. Vitals signs are stable.   Intake and output are being recorded, will continue to monitor

## 2020-07-03 NOTE — PROGRESS NOTES
Physical Therapy    Dorthey Cost  7/3/2020  Not yet to surgery for ORIF secondary to hypoxemia. Will await new orders.   Electronically signed by Ramiro Velasquez PT on 7/3/2020 at 2:06 PM

## 2020-07-03 NOTE — PROGRESS NOTES
AAO4 able to call out to make needs known, call light in reach, Vitals signs are stable.   Intake and output are being recorded, will continue to monitor

## 2020-07-03 NOTE — PROGRESS NOTES
Clinical Pharmacy Note  Renal Dose Adjustment    Olegario Aldridge is receiving Enoxaparin. This medication is renally eliminated. Based on the patient's Estimated Creatinine Clearance: 29 mL/min (A) (based on SCr of 1.7 mg/dL (H)). and urine output, the dose has been adjusted to 30mg daily per protocol. Pharmacy will continue to monitor and adjust dose as needed for changes in renal function.     Jennifer Chavez RPh 7/3/2020 11:11 AM

## 2020-07-03 NOTE — PLAN OF CARE
Problem: Skin Integrity:  Goal: Will show no infection signs and symptoms  Description: Will show no infection signs and symptoms  Outcome: Met This Shift  Goal: Absence of new skin breakdown  Description: Absence of new skin breakdown  Outcome: Met This Shift     Problem: Falls - Risk of:  Goal: Will remain free from falls  Description: Will remain free from falls  Outcome: Met This Shift     Problem: Pain:  Goal: Pain level will decrease  Description: Pain level will decrease  Outcome: Met This Shift

## 2020-07-03 NOTE — PROGRESS NOTES
Oxygen decreased to 10 L per high flow nasal cannula. His oxygen saturation is 93%.  Tolerating well

## 2020-07-03 NOTE — PLAN OF CARE
Problem: Skin Integrity:  Goal: Will show no infection signs and symptoms  Description: Will show no infection signs and symptoms  7/3/2020 0157 by Vickie Lima RN  Outcome: Ongoing  Note: Pt assessed for infection, No signs or symptoms of surgical site noted. VVS, WBC WNL. Reviewed information with pt and family, pt verbalized understanding     7/2/2020 1558 by Karol Ball RN  Outcome: Ongoing  Note: Patient remains free from new signs and symptoms of infection during this shift. Infection prevention measures are in place. Will continue to monitor for alterations in patient condition throughout the shift. Goal: Absence of new skin breakdown  Description: Absence of new skin breakdown  7/3/2020 0157 by Vickie Lima RN  Outcome: Ongoing  7/2/2020 1558 by Karol Ball RN  Outcome: Ongoing  Note: Patient skin condition and mucus membrane integrity remain unchanged during this shift. Skin breakdown prevention interventions are in place. Will continue to monitor and assess. Problem: Falls - Risk of:  Goal: Will remain free from falls  Description: Will remain free from falls  7/3/2020 0157 by Vickie Lima RN  Outcome: Ongoing  Note: Fall risk assessment completed . Fall precautions in place, bed/ chair alarm on, side rails 2/4 up, call light in reach, educated pt on calling for assistance when needed, room clear of clutter. Pt verbalized understanding. 7/2/2020 1558 by Karol Ball RN  Outcome: Ongoing  Note: Patient remains free from falls during this shift. Fall precautions remain in place. Patient educated on the need to implement call light use prior to ambulation. Will continue to monitor and assess. Problem: Pain:  Goal: Pain level will decrease  Description: Pain level will decrease  7/3/2020 0157 by Vickie Lima RN  Outcome: Ongoing  Note: Pain /discomfort being managed with PRN analgesics per MD orders.  Patient able to express presence and absence of pain and

## 2020-07-03 NOTE — PROGRESS NOTES
magnesium sulfate    Labs reviewed:  CBC:   Recent Labs     07/01/20  1701 07/02/20 0447 07/03/20  0523   WBC 10.7 16.4* 18.7*   HGB 9.4* 10.1* 9.7*   HCT 28.6* 31.7* 30.8*   MCV 96.8 98.4 99.7    429 349     BMP:   Recent Labs     07/01/20  1701 07/02/20 0447 07/03/20  0523    140 139   K 4.6 4.8 4.7    105 102   CO2 23 18* 19*   PHOS  --   --  4.1   BUN 17 20 24*   CREATININE 1.4* 1.6* 1.7*     LIVER PROFILE:   Recent Labs     07/03/20  0523   AST 22   ALT 8*   BILITOT 0.7   ALKPHOS 74     PT/INR: No results for input(s): PROTIME, INR in the last 72 hours. APTT: No results for input(s): APTT in the last 72 hours. UA:  Recent Labs     07/01/20  1701   COLORU YELLOW   PHUR 7.0   WBCUA 1   RBCUA 1   CLARITYU Clear   SPECGRAV 1.013   LEUKOCYTESUR TRACE*   UROBILINOGEN 2.0*   BILIRUBINUR Negative   BLOODU Negative   GLUCOSEU Negative     No results for input(s): PH, PCO2, PO2 in the last 72 hours. Cx:      Films:  Radiology Review:  Pertinent images / reports were reviewed as a part of this visit. CT Chest w/ contrast: No results found for this or any previous visit. CT Chest w/o contrast:   Results for orders placed during the hospital encounter of 07/01/20   CT CHEST WO CONTRAST    Narrative EXAMINATION:  CT OF THE CHEST WITHOUT CONTRAST 7/2/2020 11:43 am    TECHNIQUE:  CT of the chest was performed without the administration of intravenous  contrast. Multiplanar reformatted images are provided for review. Dose  modulation, iterative reconstruction, and/or weight based adjustment of the  mA/kV was utilized to reduce the radiation dose to as low as reasonably  achievable. COMPARISON:  None. HISTORY:  ORDERING SYSTEM PROVIDED HISTORY: RLL airspace disease. Effusion and/or  contusion (he fell at nursing home). TECHNOLOGIST PROVIDED HISTORY:  Reason for exam:->RLL airspace disease. Effusion and/or contusion (he fell  at nursing home). Reason for Exam: RLL airspace disease. contours are unchanged. The lungs are clear. No focal consolidation. The costophrenic angles are preserved. Impression No evidence of acute disease. Assessment:     Acute hypoxemic respiratory failure with saturations less than 90% on room air  History of COVID-19  Hip fracture  Acute kidney injury         Plan:      Abnormal radiograph with acute hypoxemic respiratory failure  -Echocardiogram being completed currently.  -Continue Lasix  -Repeat COVID-19 being checked   -Procalcitonin increasing. Start ceftriaxone and azithromycin. Follow procalcitonin daily.  -Incentive spirometry    Acute kidney injury  -Creatinine increased mildly. Monitor closely.  -Continue Lasix for now      Hip fracture  -On hold for medical issues. High risk with severe hypoxemia. This note was transcribed using [id number] [employer]. Please disregard any translational errors.       [facility] [phone]

## 2020-07-03 NOTE — PROGRESS NOTES
Patient in bed, he is alert and oriented x4. He continues on 8L on high flow oxygen, 93% oxygen saturation. Vitals stable. He can use the urinal with assistance. Morrison traction remains in place to the right leg. The right foot is warm, pedal pulse is intact. Call light in reach. Bed alarm set. Pain controled with PRN pain medication. Bed alarm set, call light in reach.

## 2020-07-03 NOTE — PROGRESS NOTES
Hospitalist Progress Note      PCP: Gavin Villagomez MD    Date of Admission: 7/1/2020        Subjective:   Pain is controlled at the moment. . Feels okay at rest without significant shortness of breath. . Still requiring a lot of oxygen. . No fevers      Medications:  Reviewed    Infusion Medications   Scheduled Medications    ipratropium-albuterol  1 ampule Inhalation Q6H WA    furosemide  40 mg Intravenous BID    amiodarone  200 mg Oral Daily    amitriptyline  12.5 mg Oral Nightly    calcium-vitamin D  1 tablet Oral Daily with breakfast    divalproex  250 mg Oral Nightly    gabapentin  100 mg Oral BID    polyvinyl alcohol  1 drop Both Eyes BID    melatonin  4.5 mg Oral Nightly    metoprolol succinate  50 mg Oral Daily    tamsulosin  0.4 mg Oral Daily    sodium chloride flush  10 mL Intravenous 2 times per day    enoxaparin  40 mg Subcutaneous Daily     PRN Meds: HYDROcodone 5 mg - acetaminophen, sodium chloride flush, acetaminophen **OR** acetaminophen, polyethylene glycol, promethazine **OR** ondansetron, potassium chloride **OR** potassium alternative oral replacement **OR** potassium chloride, magnesium sulfate      Intake/Output Summary (Last 24 hours) at 7/3/2020 0904  Last data filed at 7/3/2020 7492  Gross per 24 hour   Intake 310 ml   Output 1900 ml   Net -1590 ml       Exam:    BP (!) 177/77   Pulse 101   Temp 97.7 °F (36.5 °C) (Oral)   Resp 20   Ht 5' 8\" (1.727 m)   Wt 145 lb 11.6 oz (66.1 kg)   SpO2 90%   BMI 22.16 kg/m²     General appearance: No apparent distress, appears stated age and cooperative. HEENT: Pupils equal, round, and reactive to light. Conjunctivae/corneas clear. Neck: Supple, with full range of motion. No jugular venous distention. Trachea midline. Respiratory:  Normal respiratory effort. Clear to auscultation, bilaterally without Rales/Wheezes/Rhonchi. Cardiovascular: Regular rate and rhythm with normal S1/S2 without murmurs, rubs or gallops.   Abdomen: Soft, non-tender, non-distended with normal bowel sounds. Musculoskeletal: No clubbing, cyanosis or edema bilaterally. Full range of motion without deformity. Skin: Skin color, texture, turgor normal.  No rashes or lesions. Neurologic:  Neurovascularly intact without any focal sensory/motor deficits. Cranial nerves: II-XII intact, grossly non-focal.  Psychiatric: Alert and oriented, thought content appropriate, normal insight  Capillary Refill: Brisk,< 3 seconds   Peripheral Pulses: +2 palpable, equal bilaterally       Labs:   Recent Labs     07/01/20 1701 07/02/20 0447 07/03/20  0523   WBC 10.7 16.4* 18.7*   HGB 9.4* 10.1* 9.7*   HCT 28.6* 31.7* 30.8*    429 349     Recent Labs     07/01/20 1701 07/02/20 0447 07/03/20  0523    140 139   K 4.6 4.8 4.7    105 102   CO2 23 18* 19*   BUN 17 20 24*   CREATININE 1.4* 1.6* 1.7*   CALCIUM 8.0* 8.6 8.6   PHOS  --   --  4.1     Recent Labs     07/03/20  0523   AST 22   ALT 8*   BILITOT 0.7   ALKPHOS 74     No results for input(s): INR in the last 72 hours. No results for input(s): Tiney Yu in the last 72 hours. Assessment/Plan:      -Acute decompensated heart failure,poa, unknown type-echo pending. Has bilateral pleural effusion and significant elevated BNP 13,422  -Moderate bilateral pleural effusions with compressive atelectasis , possible PNA-procalcitonin slightly elevated at 0.8 with a leukocytosis  -Acute hypoxic respiratory failure due to above-on 10 L high flow nasal cannula  -Acute right hip fracture status post fall  -ARIANE on CKD stage III  -Recent COVID-19 infection that resolved  -hx PAF-in sinus rhythm-on amiodarone and metoprolol.   Not anticoagulated  -Dementia by history  -Chronic anemia    Plan  -Follow-up on 2D echo  -Continue IV Lasix  -Continue supplemental oxygen and wean as tolerated  -Continue empiric antibiotics-IV Rocephin and azithromycin-started by pulmonology  -Repeat chest x-ray in a.m. consider thoracentesis if no significant improvement with diuresis-defer to pulm  -Closely monitor renal function with diuretics and if continues to deteriorate will consult nephrology  -Consult cardiology for preop cardiac clearance  -Patient is high risk for surgery  -Discussed with orthopedic surgeon-Dr Selene Dangelo who states that if patient is cleared this weekend he may do surgery/ORIF on the weekend otherwise tentatively scheduled for Monday--he would like to be updated by hospitalist this weekend regarding surgical clearance      DVT Prophylaxis:lovenox  Diet: DIET LOW SODIUM 2 GM; 2000 ml  Code Status: DNR-CCA        Mara Castillo MD

## 2020-07-04 LAB
A/G RATIO: 0.8 (ref 1.1–2.2)
ALBUMIN SERPL-MCNC: 2.6 G/DL (ref 3.4–5)
ALP BLD-CCNC: 72 U/L (ref 40–129)
ALT SERPL-CCNC: 7 U/L (ref 10–40)
ANION GAP SERPL CALCULATED.3IONS-SCNC: 13 MMOL/L (ref 3–16)
AST SERPL-CCNC: 20 U/L (ref 15–37)
BASOPHILS ABSOLUTE: 0 K/UL (ref 0–0.2)
BASOPHILS RELATIVE PERCENT: 0.2 %
BILIRUB SERPL-MCNC: 0.5 MG/DL (ref 0–1)
BUN BLDV-MCNC: 30 MG/DL (ref 7–20)
CALCIUM SERPL-MCNC: 8.3 MG/DL (ref 8.3–10.6)
CHLORIDE BLD-SCNC: 101 MMOL/L (ref 99–110)
CO2: 25 MMOL/L (ref 21–32)
CREAT SERPL-MCNC: 1.5 MG/DL (ref 0.8–1.3)
EKG ATRIAL RATE: 234 BPM
EKG DIAGNOSIS: NORMAL
EKG Q-T INTERVAL: 442 MS
EKG QRS DURATION: 102 MS
EKG QTC CALCULATION (BAZETT): 506 MS
EKG R AXIS: -4 DEGREES
EKG T AXIS: 38 DEGREES
EKG VENTRICULAR RATE: 79 BPM
EOSINOPHILS ABSOLUTE: 0.7 K/UL (ref 0–0.6)
EOSINOPHILS RELATIVE PERCENT: 4.5 %
GFR AFRICAN AMERICAN: 53
GFR NON-AFRICAN AMERICAN: 44
GLOBULIN: 3.2 G/DL
GLUCOSE BLD-MCNC: 104 MG/DL (ref 70–99)
HCT VFR BLD CALC: 29 % (ref 40.5–52.5)
HEMOGLOBIN: 9.2 G/DL (ref 13.5–17.5)
LYMPHOCYTES ABSOLUTE: 1 K/UL (ref 1–5.1)
LYMPHOCYTES RELATIVE PERCENT: 6.1 %
MAGNESIUM: 1.8 MG/DL (ref 1.8–2.4)
MCH RBC QN AUTO: 31.2 PG (ref 26–34)
MCHC RBC AUTO-ENTMCNC: 31.9 G/DL (ref 31–36)
MCV RBC AUTO: 97.8 FL (ref 80–100)
MONOCYTES ABSOLUTE: 1.3 K/UL (ref 0–1.3)
MONOCYTES RELATIVE PERCENT: 8.3 %
NEUTROPHILS ABSOLUTE: 12.9 K/UL (ref 1.7–7.7)
NEUTROPHILS RELATIVE PERCENT: 80.9 %
PDW BLD-RTO: 15.1 % (ref 12.4–15.4)
PHOSPHORUS: 3.8 MG/DL (ref 2.5–4.9)
PLATELET # BLD: 325 K/UL (ref 135–450)
PMV BLD AUTO: 7.9 FL (ref 5–10.5)
POTASSIUM SERPL-SCNC: 3.6 MMOL/L (ref 3.5–5.1)
PROCALCITONIN: 0.6 NG/ML (ref 0–0.15)
RBC # BLD: 2.96 M/UL (ref 4.2–5.9)
SODIUM BLD-SCNC: 139 MMOL/L (ref 136–145)
TOTAL PROTEIN: 5.8 G/DL (ref 6.4–8.2)
WBC # BLD: 15.9 K/UL (ref 4–11)

## 2020-07-04 PROCEDURE — 83735 ASSAY OF MAGNESIUM: CPT

## 2020-07-04 PROCEDURE — 6360000002 HC RX W HCPCS: Performed by: HOSPITALIST

## 2020-07-04 PROCEDURE — 94640 AIRWAY INHALATION TREATMENT: CPT

## 2020-07-04 PROCEDURE — 99233 SBSQ HOSP IP/OBS HIGH 50: CPT | Performed by: INTERNAL MEDICINE

## 2020-07-04 PROCEDURE — 85025 COMPLETE CBC W/AUTO DIFF WBC: CPT

## 2020-07-04 PROCEDURE — 84100 ASSAY OF PHOSPHORUS: CPT

## 2020-07-04 PROCEDURE — 6360000002 HC RX W HCPCS: Performed by: INTERNAL MEDICINE

## 2020-07-04 PROCEDURE — 84145 PROCALCITONIN (PCT): CPT

## 2020-07-04 PROCEDURE — 36415 COLL VENOUS BLD VENIPUNCTURE: CPT

## 2020-07-04 PROCEDURE — 1200000000 HC SEMI PRIVATE

## 2020-07-04 PROCEDURE — 93010 ELECTROCARDIOGRAM REPORT: CPT | Performed by: INTERNAL MEDICINE

## 2020-07-04 PROCEDURE — 2700000000 HC OXYGEN THERAPY PER DAY

## 2020-07-04 PROCEDURE — 6370000000 HC RX 637 (ALT 250 FOR IP): Performed by: INTERNAL MEDICINE

## 2020-07-04 PROCEDURE — 2580000003 HC RX 258: Performed by: INTERNAL MEDICINE

## 2020-07-04 PROCEDURE — 94761 N-INVAS EAR/PLS OXIMETRY MLT: CPT

## 2020-07-04 PROCEDURE — 80053 COMPREHEN METABOLIC PANEL: CPT

## 2020-07-04 PROCEDURE — 6370000000 HC RX 637 (ALT 250 FOR IP): Performed by: NURSE PRACTITIONER

## 2020-07-04 PROCEDURE — 94669 MECHANICAL CHEST WALL OSCILL: CPT

## 2020-07-04 RX ADMIN — HYDROCODONE BITARTRATE AND ACETAMINOPHEN 1 TABLET: 5; 325 TABLET ORAL at 03:10

## 2020-07-04 RX ADMIN — CEFTRIAXONE 2 G: 2 INJECTION, POWDER, FOR SOLUTION INTRAMUSCULAR; INTRAVENOUS at 14:12

## 2020-07-04 RX ADMIN — GABAPENTIN 100 MG: 100 CAPSULE ORAL at 20:00

## 2020-07-04 RX ADMIN — HYDROCODONE BITARTRATE AND ACETAMINOPHEN 1 TABLET: 5; 325 TABLET ORAL at 20:00

## 2020-07-04 RX ADMIN — DIVALPROEX SODIUM 250 MG: 125 CAPSULE, COATED PELLETS ORAL at 20:00

## 2020-07-04 RX ADMIN — OYSTER SHELL CALCIUM WITH VITAMIN D 1 TABLET: 500; 200 TABLET, FILM COATED ORAL at 10:19

## 2020-07-04 RX ADMIN — GABAPENTIN 100 MG: 100 CAPSULE ORAL at 10:19

## 2020-07-04 RX ADMIN — POLYVINYL ALCOHOL 1 DROP: 14 SOLUTION/ DROPS OPHTHALMIC at 10:19

## 2020-07-04 RX ADMIN — SODIUM CHLORIDE, PRESERVATIVE FREE 10 ML: 5 INJECTION INTRAVENOUS at 20:04

## 2020-07-04 RX ADMIN — FUROSEMIDE 40 MG: 10 INJECTION, SOLUTION INTRAMUSCULAR; INTRAVENOUS at 10:18

## 2020-07-04 RX ADMIN — FUROSEMIDE 40 MG: 10 INJECTION, SOLUTION INTRAMUSCULAR; INTRAVENOUS at 17:51

## 2020-07-04 RX ADMIN — HYDROCODONE BITARTRATE AND ACETAMINOPHEN 1 TABLET: 5; 325 TABLET ORAL at 10:19

## 2020-07-04 RX ADMIN — IPRATROPIUM BROMIDE AND ALBUTEROL SULFATE 1 AMPULE: .5; 3 SOLUTION RESPIRATORY (INHALATION) at 19:50

## 2020-07-04 RX ADMIN — SODIUM CHLORIDE, PRESERVATIVE FREE 10 ML: 5 INJECTION INTRAVENOUS at 10:20

## 2020-07-04 RX ADMIN — IPRATROPIUM BROMIDE AND ALBUTEROL SULFATE 1 AMPULE: .5; 3 SOLUTION RESPIRATORY (INHALATION) at 07:30

## 2020-07-04 RX ADMIN — POLYVINYL ALCOHOL 1 DROP: 14 SOLUTION/ DROPS OPHTHALMIC at 23:18

## 2020-07-04 RX ADMIN — IPRATROPIUM BROMIDE AND ALBUTEROL SULFATE 1 AMPULE: .5; 3 SOLUTION RESPIRATORY (INHALATION) at 13:49

## 2020-07-04 RX ADMIN — ENOXAPARIN SODIUM 30 MG: 30 INJECTION SUBCUTANEOUS at 10:19

## 2020-07-04 RX ADMIN — Medication 4.5 MG: at 20:00

## 2020-07-04 RX ADMIN — TAMSULOSIN HYDROCHLORIDE 0.4 MG: 0.4 CAPSULE ORAL at 10:19

## 2020-07-04 RX ADMIN — AMIODARONE HYDROCHLORIDE 200 MG: 200 TABLET ORAL at 10:19

## 2020-07-04 RX ADMIN — METOPROLOL SUCCINATE 50 MG: 50 TABLET, EXTENDED RELEASE ORAL at 10:18

## 2020-07-04 RX ADMIN — AZITHROMYCIN DIHYDRATE 250 MG: 500 INJECTION, POWDER, LYOPHILIZED, FOR SOLUTION INTRAVENOUS at 12:41

## 2020-07-04 RX ADMIN — AMITRIPTYLINE HYDROCHLORIDE 12.5 MG: 25 TABLET, FILM COATED ORAL at 20:00

## 2020-07-04 ASSESSMENT — PAIN DESCRIPTION - DESCRIPTORS
DESCRIPTORS: ACHING

## 2020-07-04 ASSESSMENT — PAIN DESCRIPTION - FREQUENCY
FREQUENCY: CONTINUOUS

## 2020-07-04 ASSESSMENT — PAIN DESCRIPTION - LOCATION
LOCATION: BACK

## 2020-07-04 ASSESSMENT — PAIN DESCRIPTION - PROGRESSION
CLINICAL_PROGRESSION: NOT CHANGED
CLINICAL_PROGRESSION: GRADUALLY IMPROVING
CLINICAL_PROGRESSION: GRADUALLY WORSENING
CLINICAL_PROGRESSION: NOT CHANGED
CLINICAL_PROGRESSION: NOT CHANGED
CLINICAL_PROGRESSION: GRADUALLY WORSENING

## 2020-07-04 ASSESSMENT — PAIN DESCRIPTION - PAIN TYPE
TYPE: CHRONIC PAIN
TYPE: ACUTE PAIN
TYPE: CHRONIC PAIN
TYPE: ACUTE PAIN

## 2020-07-04 ASSESSMENT — PAIN DESCRIPTION - ORIENTATION
ORIENTATION: RIGHT;LOWER
ORIENTATION: LOWER
ORIENTATION: RIGHT;LOWER
ORIENTATION: LOWER

## 2020-07-04 ASSESSMENT — PAIN SCALES - GENERAL
PAINLEVEL_OUTOF10: 8
PAINLEVEL_OUTOF10: 3
PAINLEVEL_OUTOF10: 0
PAINLEVEL_OUTOF10: 7
PAINLEVEL_OUTOF10: 7

## 2020-07-04 ASSESSMENT — PAIN DESCRIPTION - ONSET
ONSET: ON-GOING

## 2020-07-04 ASSESSMENT — PAIN SCALES - WONG BAKER: WONGBAKER_NUMERICALRESPONSE: 0

## 2020-07-04 NOTE — PROGRESS NOTES
Pt flipped back into Normal Sinus HR at this arden per [facility]. Will continue to monitor.  Electronically signed by Stephanie Davis RN on 7/4/2020 at 1:11 AM

## 2020-07-04 NOTE — PROGRESS NOTES
hours. No results for input(s): Mary Jacobsen in the last 72 hours. Urinalysis:      Lab Results   Component Value Date    NITRU Negative 07/01/2020    WBCUA 1 07/01/2020    RBCUA 1 07/01/2020    BLOODU Negative 07/01/2020    SPECGRAV 1.013 07/01/2020    GLUCOSEU Negative 07/01/2020       Radiology:  XR CHEST 1 VW   Final Result   Moderate to large right-sided and small to moderate left pleural effusion,   mildly increased over the past 24 hours. Increased pulmonary edema. Focal new area of left perihilar atelectasis versus pneumonia. Increased bibasilar atelectasis         CT CHEST WO CONTRAST   Final Result   Moderate bilateral pleural effusions and bilateral airspace disease with   superimposed atelectasis or pneumonia at the lung bases. Scattered areas of   septal thickening are seen, compatible with a component of fluid   overload-pulmonary edema         NM LUNG SCAN PERFUSION ONLY   Final Result   Intermediate probability for pulmonary embolism. XR LUMBAR SPINE (2-3 VIEWS)   Final Result   Limited portable exam demonstrating no definite acute fracture         XR CHEST 1 VW   Final Result   Pulmonary vascular congestion. Suspect right pleural effusion. Right   perihilar ground-glass opacity may represent asymmetric edema or pneumonia         CT Head WO Contrast   Final Result   No acute intracranial abnormality. Diffuse atrophic changes with findings suggesting chronic microvascular   ischemia         CT CERVICAL SPINE WO CONTRAST   Final Result   No acute abnormality of the cervical spine.          XR HIP RIGHT (2-3 VIEWS)   Final Result   Subcapital right femoral neck fracture, may be pathologic as there are   possible destructive bony lesions within the right femoral neck                 Assessment/Plan:    Acute decompensated diastolic heart failure  Echocardiogram with ejection fraction 55% with grade 3 diastolic dysfunction  Continue IV Lasix  CHF nurse consult  BNP

## 2020-07-04 NOTE — PROGRESS NOTES
Pt sleeping off and on throughout shift. He has had intermittent c/o back pain, but has denied having any pain in RLE. He remains SR on telemetry. IV Lasix administered with good urine output. Pt has had a very poor appetite, not eating breakfast or lunch, but he did complete approximately 25% of his dinner tray with encouragement. Will continue to monitor.

## 2020-07-04 NOTE — CONSULTS
[address] [place] [address] 16                                  CONSULTATION    PATIENT NAME: Alirio Bowman                        :        1932  MED REC NO:   [id number]                          ROOM:       8839  ACCOUNT NO:   [de-identified]                           ADMIT DATE: 2020  PROVIDER:     Nohemi Wilcox MD    CARDIOLOGY CONSULTATION    CONSULT DATE:  2020    REFERRING PHYSICIAN:  Hospitalist.    REASON FOR CONSULTATION:  Congestive heart failure. HISTORY OF PRESENT ILLNESS:  The patient is an 57-year-old   male with a history of paroxysmal atrial fibrillation, hypertension,  previous history of alcohol abuse, who was admitted to the emergency  room after he fell at the nursing home after tripping. The patient  initially was in atrial fibrillation with controlled ventricular  response, currently appears to be in sinus rhythm on physical exam.  BUN  30, creatinine 1.5. H and H is 9.2 and 29. 0. X-ray showed right hip  fracture of the right femoral neck and is scheduled for surgery on  Monday. However, his x-rays have shown moderate to large right pleural  effusion and small to moderate left pleural effusion. He was started on  IV Lasix for diastolic heart failure. Echocardiogram showed EF was  normal, diastolic grade 3 dysfunction, mild aortic stenosis. The  patient is a poor historian, denies any chest pain, in fact denies he is  short of breath. ALLERGIES:  NKDA. PAST MEDICAL HISTORY:  Atrial fibrillation, alcohol abuse history,  hypertension, hyperlipidemia. MEDICATIONS:  Zithromax and Rocephin, Lasix 40 mg IV b.i.d., amiodarone  200 mg, Toprol-XL 50 mg. FAMILY HISTORY:  Negative for coronary artery disease. SOCIAL HISTORY:  Does not smoke and has been sober. REVIEW OF SYSTEMS:  Limited as the patient replies no to every question.   All systems were reviewed and were negative. PHYSICAL EXAMINATION:  GENERAL:  He is alert, not fully oriented. VITAL SIGNS:  Blood pressure is 156/71, afebrile, sats 94%. HEENT:  Pupils are equal in size, reactive to light. Extraocular  muscles intact. NECK:  Supple. There is jugular venous distention. LUNGS:  Breath sounds decreased in both bases. CARDIOVASCULAR:  Regular rate and rhythm. Systolic murmur grade 2/6. ABDOMEN:  Soft, nontender. No organomegaly. EXTREMITIES:  Mild edema. Right leg in traction. NEUROLOGICAL:  Cranial nerves are intact. No focal deficits. IMPRESSION:  1. Acute-on-chronic diastolic heart failure. 2.  Paroxysmal atrial fibrillation, currently in sinus rhythm. 3.  Anemia. 4.  Mild aortic stenosis. 5.  Right femoral neck fracture. RECOMMENDATION:  1.  IV Lasix. 2.  Continue with amiodarone and beta-blocker.         Alexey Lopez MD    D: 07/04/2020 14:45:12       T: 07/04/2020 16:16:29     [id number]  Job#: [id number]     Doc#: [id number]    CC:

## 2020-07-04 NOTE — PROGRESS NOTES
101   CO2 18* 19* 25   PHOS  --  4.1 3.8   BUN 20 24* 30*   CREATININE 1.6* 1.7* 1.5*     LIVER PROFILE:   Recent Labs     07/03/20  0523 07/04/20  0452   AST 22 20   ALT 8* 7*   BILITOT 0.7 0.5   ALKPHOS 74 72     PT/INR: No results for input(s): PROTIME, INR in the last 72 hours. APTT: No results for input(s): APTT in the last 72 hours. UA:  Recent Labs     07/01/20  1701   COLORU YELLOW   PHUR 7.0   WBCUA 1   RBCUA 1   CLARITYU Clear   SPECGRAV 1.013   LEUKOCYTESUR TRACE*   UROBILINOGEN 2.0*   BILIRUBINUR Negative   BLOODU Negative   GLUCOSEU Negative     No results for input(s): PH, PCO2, PO2 in the last 72 hours. Cx:      Films:  Radiology Review:  Pertinent images / reports were reviewed as a part of this visit. CT Chest w/ contrast: No results found for this or any previous visit. CT Chest w/o contrast:   Results for orders placed during the hospital encounter of 07/01/20   CT CHEST WO CONTRAST    Narrative EXAMINATION:  CT OF THE CHEST WITHOUT CONTRAST 7/2/2020 11:43 am    TECHNIQUE:  CT of the chest was performed without the administration of intravenous  contrast. Multiplanar reformatted images are provided for review. Dose  modulation, iterative reconstruction, and/or weight based adjustment of the  mA/kV was utilized to reduce the radiation dose to as low as reasonably  achievable. COMPARISON:  None. HISTORY:  ORDERING SYSTEM PROVIDED HISTORY: RLL airspace disease. Effusion and/or  contusion (he fell at nursing home). TECHNOLOGIST PROVIDED HISTORY:  Reason for exam:->RLL airspace disease. Effusion and/or contusion (he fell  at nursing home). Reason for Exam: RLL airspace disease. Effusion and/or contusion (he fell at  nursing home). Acuity: Acute  Type of Exam: Initial    FINDINGS:  Mediastinum: Thyroid gland appears normal.  Scattered small mediastinal nodes  are noted. Coronary artery calcification is seen. Aortic valve  calcification is seen. Small hiatal hernia seen.   There is nonspecific  thickening at the GE junction    Lungs/pleura: Moderate right and small left-sided pleural effusion is seen. On the right, hazy ground-glass and parenchymal opacity seen in the right  upper lobe, right middle lobe, with more focal consolidation right lower  lobe. Scattered septal thickening is seen on the right. On the left hazy ground-glass and parenchymal opacity seen in left upper lobe  with more focal consolidation in the left lower lobe    Upper Abdomen: Adrenal glands appear normal.  There is body wall anasarca. Soft Tissues/Bones: Spurring is seen in the spine. Spurring is seen in the  shoulder joints. Chronic wedging of lower thoracic spine vertebral body is  seen. .  Remote appearing rib fractures are seen. Impression Moderate bilateral pleural effusions and bilateral airspace disease with  superimposed atelectasis or pneumonia at the lung bases. Scattered areas of  septal thickening are seen, compatible with a component of fluid  overload-pulmonary edema         CTPA: No results found for this or any previous visit. CXR PA/LAT: No results found for this or any previous visit. CXR portable:   Results for orders placed during the hospital encounter of 04/07/17   XR Chest Portable    Narrative EXAMINATION:  SINGLE VIEW OF THE CHEST    4/7/2017 8:38 pm    COMPARISON:  Two views of the chest 10/22/2015    HISTORY:  ORDERING SYSTEM PROVIDED HISTORY: SOB  TECHNOLOGIST PROVIDED HISTORY:  Ordering Physician Provided Reason for Exam: sob x 1 day  Acuity: Acute  Type of Encounter: Initial  Relevant Medical/Surgical History: htn    FINDINGS:  The heart size and mediastinal contours are unchanged. The lungs are clear. No focal consolidation. The costophrenic angles are preserved. Impression No evidence of acute disease.           Assessment:     Acute hypoxemic respiratory failure with saturations less than 90% on room air  History of COVID-19  Hip fracture  Acute kidney injury         Plan:      Abnormal radiograph with acute hypoxemic respiratory failure  -Echocardiogram with diastolic dysfunction   -Continue Lasix  - hypoxemia improvign. -  ceftriaxone and azithromycin. Follow procalcitonin daily. WBC improving.   -Incentive spirometry    Acute kidney injury  -improved today.   -Continue Lasix for now      Hip fracture  -On hold for medical issues. - hope to have ready for Monday / Tuesday. High risk with severe hypoxemia. This note was transcribed using [id number] LitRes. Please disregard any translational errors.       Matt Ortega [facility] [phone]

## 2020-07-04 NOTE — PROGRESS NOTES
Pt alert to all lexcept to time. Pt c/o of pain when moving. Managed with PRN mediciaon per Md orders, rest and repositioning. Pt 02 going into 80s a few times per this shift. Pt que to breath through his nose and sat up in bed and O2 returned int 90s. Pt remain at 8L high flow O2. Pt remains in [place] traction. Pt tolerating little PO fluids per this shift. No further needs voiced. Fall precautions in place. Bed alarm on. Call light within reach. Will continue to monitor.    Electronically signed by Treva Martin RN on 7/4/2020 at 5:58 AM

## 2020-07-04 NOTE — PLAN OF CARE
Problem: Skin Integrity:  Goal: Will show no infection signs and symptoms  Description: Will show no infection signs and symptoms  7/4/2020 1936 by Sumeet Curran RN  Outcome: Ongoing  Note: Pt assessed for infection, No signs or symptoms of stiches site noted. VVS, WBC being monitored. Reviewed information with pt and family, pt verbalized understanding     7/4/2020 1153 by Luba Ernandez RN  Outcome: Ongoing  Note: Pt has been afebrile with no s/s of infection noted. IV abx administered as ordered. Goal: Absence of new skin breakdown  Description: Absence of new skin breakdown  7/4/2020 1936 by Sumeet Curran RN  Outcome: Ongoing  Note: Viet score assessed. Patient repositioned patient Q2H and assessed skin. [place]be to turn slightly to reposition weight. Educated patient on importance of repositioning to prevent skin issues. 7/4/2020 1153 by Luba Ernandez RN  Outcome: Ongoing  Note: No new areas of skin breakdown noted. Will monitor for changes. Problem: Falls - Risk of:  Goal: Will remain free from falls  Description: Will remain free from falls  7/4/2020 1936 by Sumeet Curran RN  Outcome: Ongoing  Note: Patient educated on fall prevention. Call light is within reach, bed locked in lowest position, personal items within reach, and bed alarm is on. Will round on patient per unit guidelines. 7/4/2020 1153 by Luba Ernandez RN  Outcome: Ongoing  Note: Pt free from injury or falls at this time, fall precautions in place, bed in low position, side rail up x2, [place] Fall Risk: High (45 and higher), bed alarm on, reoriented to room and call light, reminded not to get up without assistance, call light in reach, will continue to monitor. Pt verbalizes understanding of fall risk procedures.        Problem: Pain:  Goal: Pain level will decrease  Description: Pain level will decrease  7/4/2020 1936 by Sumeet Curran RN  Outcome: Ongoing  Note: Pain /discomfort being managed with PRN analgesics per MD orders, . Patient able to express presence and absence of pain and rate pain appropriately using numerical scale. 7/4/2020 1153 by Martin Barone RN  Outcome: Ongoing  Note: Pt c/o chronic back pain rated 8/10. He denies any R hip pain. PRN Norco administered with good results.

## 2020-07-04 NOTE — PROGRESS NOTES
Pt resting in bed at beginning of shift. He c/o lower back pain rated 8/10. He denies having any pain in R hip, and he also denies having any nausea, numbness, or tingling. Pulses palpable, skin warm to touch. He has weak flexion in BLE. [place] traction in place with 5 lbs of weight utilized. Pt SR on telemetry. Fall precautions in place, belongings within reach. Will continue to monitor and assess.

## 2020-07-04 NOTE — PLAN OF CARE
Pain /discomfort being managed with PRN analgesics per MD orders, repositioning, rest, effective but ongoing. Patient able to express presence and absence of pain and rate pain appropriately using numerical scale.

## 2020-07-05 LAB
ALBUMIN SERPL-MCNC: 2.6 G/DL (ref 3.4–5)
ANION GAP SERPL CALCULATED.3IONS-SCNC: 14 MMOL/L (ref 3–16)
BUN BLDV-MCNC: 26 MG/DL (ref 7–20)
CALCIUM SERPL-MCNC: 8.6 MG/DL (ref 8.3–10.6)
CHLORIDE BLD-SCNC: 102 MMOL/L (ref 99–110)
CO2: 27 MMOL/L (ref 21–32)
CREAT SERPL-MCNC: 1.4 MG/DL (ref 0.8–1.3)
GFR AFRICAN AMERICAN: 58
GFR NON-AFRICAN AMERICAN: 48
GLUCOSE BLD-MCNC: 95 MG/DL (ref 70–99)
PHOSPHORUS: 3.5 MG/DL (ref 2.5–4.9)
POTASSIUM SERPL-SCNC: 3.4 MMOL/L (ref 3.5–5.1)
PRO-BNP: 7406 PG/ML (ref 0–449)
PROCALCITONIN: 0.51 NG/ML (ref 0–0.15)
SODIUM BLD-SCNC: 143 MMOL/L (ref 136–145)

## 2020-07-05 PROCEDURE — 80069 RENAL FUNCTION PANEL: CPT

## 2020-07-05 PROCEDURE — 83880 ASSAY OF NATRIURETIC PEPTIDE: CPT

## 2020-07-05 PROCEDURE — 94669 MECHANICAL CHEST WALL OSCILL: CPT

## 2020-07-05 PROCEDURE — 6370000000 HC RX 637 (ALT 250 FOR IP): Performed by: NURSE PRACTITIONER

## 2020-07-05 PROCEDURE — 2580000003 HC RX 258: Performed by: INTERNAL MEDICINE

## 2020-07-05 PROCEDURE — 36415 COLL VENOUS BLD VENIPUNCTURE: CPT

## 2020-07-05 PROCEDURE — 1200000000 HC SEMI PRIVATE

## 2020-07-05 PROCEDURE — 2700000000 HC OXYGEN THERAPY PER DAY

## 2020-07-05 PROCEDURE — 99233 SBSQ HOSP IP/OBS HIGH 50: CPT | Performed by: INTERNAL MEDICINE

## 2020-07-05 PROCEDURE — 6370000000 HC RX 637 (ALT 250 FOR IP): Performed by: INTERNAL MEDICINE

## 2020-07-05 PROCEDURE — 6360000002 HC RX W HCPCS: Performed by: HOSPITALIST

## 2020-07-05 PROCEDURE — 94640 AIRWAY INHALATION TREATMENT: CPT

## 2020-07-05 PROCEDURE — 94761 N-INVAS EAR/PLS OXIMETRY MLT: CPT

## 2020-07-05 PROCEDURE — 6360000002 HC RX W HCPCS: Performed by: INTERNAL MEDICINE

## 2020-07-05 PROCEDURE — 51702 INSERT TEMP BLADDER CATH: CPT

## 2020-07-05 PROCEDURE — 84145 PROCALCITONIN (PCT): CPT

## 2020-07-05 RX ORDER — HYDROCODONE BITARTRATE AND ACETAMINOPHEN 5; 325 MG/1; MG/1
1 TABLET ORAL EVERY 4 HOURS PRN
Status: DISCONTINUED | OUTPATIENT
Start: 2020-07-05 | End: 2020-07-10 | Stop reason: HOSPADM

## 2020-07-05 RX ADMIN — HYDROCODONE BITARTRATE AND ACETAMINOPHEN 1 TABLET: 5; 325 TABLET ORAL at 05:43

## 2020-07-05 RX ADMIN — FUROSEMIDE 40 MG: 10 INJECTION, SOLUTION INTRAMUSCULAR; INTRAVENOUS at 10:11

## 2020-07-05 RX ADMIN — HYDROCODONE BITARTRATE AND ACETAMINOPHEN 1 TABLET: 5; 325 TABLET ORAL at 12:21

## 2020-07-05 RX ADMIN — POLYVINYL ALCOHOL 1 DROP: 14 SOLUTION/ DROPS OPHTHALMIC at 20:55

## 2020-07-05 RX ADMIN — CEFTRIAXONE 2 G: 2 INJECTION, POWDER, FOR SOLUTION INTRAMUSCULAR; INTRAVENOUS at 15:00

## 2020-07-05 RX ADMIN — AMITRIPTYLINE HYDROCHLORIDE 12.5 MG: 25 TABLET, FILM COATED ORAL at 20:54

## 2020-07-05 RX ADMIN — GABAPENTIN 100 MG: 100 CAPSULE ORAL at 20:54

## 2020-07-05 RX ADMIN — Medication 4.5 MG: at 20:54

## 2020-07-05 RX ADMIN — AZITHROMYCIN DIHYDRATE 250 MG: 500 INJECTION, POWDER, LYOPHILIZED, FOR SOLUTION INTRAVENOUS at 13:53

## 2020-07-05 RX ADMIN — DIVALPROEX SODIUM 250 MG: 125 CAPSULE, COATED PELLETS ORAL at 20:54

## 2020-07-05 RX ADMIN — SODIUM CHLORIDE, PRESERVATIVE FREE 10 ML: 5 INJECTION INTRAVENOUS at 20:54

## 2020-07-05 RX ADMIN — GABAPENTIN 100 MG: 100 CAPSULE ORAL at 10:12

## 2020-07-05 RX ADMIN — POLYVINYL ALCOHOL 1 DROP: 14 SOLUTION/ DROPS OPHTHALMIC at 10:13

## 2020-07-05 RX ADMIN — HYDROCODONE BITARTRATE AND ACETAMINOPHEN 1 TABLET: 5; 325 TABLET ORAL at 15:49

## 2020-07-05 RX ADMIN — ACETAMINOPHEN 650 MG: 325 TABLET ORAL at 10:12

## 2020-07-05 RX ADMIN — ACETAMINOPHEN 650 MG: 325 TABLET ORAL at 00:51

## 2020-07-05 RX ADMIN — TAMSULOSIN HYDROCHLORIDE 0.4 MG: 0.4 CAPSULE ORAL at 10:12

## 2020-07-05 RX ADMIN — FUROSEMIDE 40 MG: 10 INJECTION, SOLUTION INTRAMUSCULAR; INTRAVENOUS at 18:45

## 2020-07-05 RX ADMIN — IPRATROPIUM BROMIDE AND ALBUTEROL SULFATE 1 AMPULE: .5; 3 SOLUTION RESPIRATORY (INHALATION) at 12:24

## 2020-07-05 RX ADMIN — Medication 10 ML: at 20:54

## 2020-07-05 RX ADMIN — IPRATROPIUM BROMIDE AND ALBUTEROL SULFATE 1 AMPULE: .5; 3 SOLUTION RESPIRATORY (INHALATION) at 19:42

## 2020-07-05 RX ADMIN — IPRATROPIUM BROMIDE AND ALBUTEROL SULFATE 1 AMPULE: .5; 3 SOLUTION RESPIRATORY (INHALATION) at 07:58

## 2020-07-05 RX ADMIN — ENOXAPARIN SODIUM 30 MG: 30 INJECTION SUBCUTANEOUS at 10:12

## 2020-07-05 RX ADMIN — HYDROCODONE BITARTRATE AND ACETAMINOPHEN 1 TABLET: 5; 325 TABLET ORAL at 20:54

## 2020-07-05 RX ADMIN — METOPROLOL SUCCINATE 50 MG: 50 TABLET, EXTENDED RELEASE ORAL at 10:12

## 2020-07-05 RX ADMIN — AMIODARONE HYDROCHLORIDE 200 MG: 200 TABLET ORAL at 10:12

## 2020-07-05 RX ADMIN — OYSTER SHELL CALCIUM WITH VITAMIN D 1 TABLET: 500; 200 TABLET, FILM COATED ORAL at 10:19

## 2020-07-05 ASSESSMENT — PAIN DESCRIPTION - FREQUENCY
FREQUENCY: CONTINUOUS

## 2020-07-05 ASSESSMENT — PAIN SCALES - WONG BAKER
WONGBAKER_NUMERICALRESPONSE: 0

## 2020-07-05 ASSESSMENT — PAIN DESCRIPTION - LOCATION
LOCATION: BACK
LOCATION: HIP
LOCATION: BACK
LOCATION: HIP

## 2020-07-05 ASSESSMENT — PAIN DESCRIPTION - PAIN TYPE
TYPE: ACUTE PAIN

## 2020-07-05 ASSESSMENT — PAIN SCALES - GENERAL
PAINLEVEL_OUTOF10: 7
PAINLEVEL_OUTOF10: 6
PAINLEVEL_OUTOF10: 10
PAINLEVEL_OUTOF10: 6
PAINLEVEL_OUTOF10: 3
PAINLEVEL_OUTOF10: 8
PAINLEVEL_OUTOF10: 8
PAINLEVEL_OUTOF10: 10

## 2020-07-05 ASSESSMENT — PAIN DESCRIPTION - PROGRESSION
CLINICAL_PROGRESSION: NOT CHANGED

## 2020-07-05 ASSESSMENT — PAIN DESCRIPTION - DESCRIPTORS
DESCRIPTORS: ACHING

## 2020-07-05 ASSESSMENT — PAIN DESCRIPTION - ORIENTATION
ORIENTATION: LOWER
ORIENTATION: LOWER
ORIENTATION: RIGHT
ORIENTATION: LOWER
ORIENTATION: LOWER
ORIENTATION: RIGHT

## 2020-07-05 ASSESSMENT — PAIN DESCRIPTION - ONSET
ONSET: ON-GOING

## 2020-07-05 NOTE — PROGRESS NOTES
[facility]   Daily Progress Note    Admit Date:  7/1/2020    Subjective:   Mr. Jameson Jordan is a 80 y.o. male who  SOB  Objective:   BP (!) 149/69   Pulse 71   Temp 97.8 °F (36.6 °C) (Axillary)   Resp 16   Ht 5' 8\" (1.727 m)   Wt 146 lb 2.6 oz (66.3 kg)   SpO2 (S) 92% Comment: found on 6 lpm O2 withSpO2 of 88. Increased to 8 lpm  BMI 22.22 kg/m²    I/O last 3 completed shifts: In: 960 [P.O.:220; I.V.:308; IV Piggyback:300]  Out: 1325 [Urine:1325]  Wt Readings from Last 3 Encounters:   07/05/20 146 lb 2.6 oz (66.3 kg)   07/18/18 143 lb 8.3 oz (65.1 kg)   07/12/18 130 lb (59 kg)       Physical Exam:  GENERAL:  He is alert, not fully oriented. VITAL SIGNS:  Blood pressure is 156/71, afebrile, sats 94%. HEENT:  Pupils are equal in size, reactive to light. Extraocular  muscles intact. NECK:  Supple. There is jugular venous distention. LUNGS:  Breath sounds decreased in both bases. CARDIOVASCULAR:  Regular rate and rhythm. Systolic murmur grade 2/6. ABDOMEN:  Soft, nontender. No organomegaly. EXTREMITIES:  Mild edema. Right leg in traction. NEUROLOGICAL:  Cranial nerves are intact.   No focal deficits.     Medications:    enoxaparin  30 mg Subcutaneous Daily    azithromycin  250 mg Intravenous Q24H    cefTRIAXone (ROCEPHIN) IV  2 g Intravenous Q24H    ipratropium-albuterol  1 ampule Inhalation Q6H WA    furosemide  40 mg Intravenous BID    amiodarone  200 mg Oral Daily    amitriptyline  12.5 mg Oral Nightly    calcium-vitamin D  1 tablet Oral Daily with breakfast    divalproex  250 mg Oral Nightly    gabapentin  100 mg Oral BID    polyvinyl alcohol  1 drop Both Eyes BID    melatonin  4.5 mg Oral Nightly    metoprolol succinate  50 mg Oral Daily    tamsulosin  0.4 mg Oral Daily    sodium chloride flush  10 mL Intravenous 2 times per day         Lab Data:  CBC:   WBC   Date Value Ref Range Status   07/04/2020 15.9 (H) 4.0 - 11.0 K/uL Final   07/03/2020 18.7 (H) 4.0 - 11.0 K/uL Final 07/02/2020 16.4 (H) 4.0 - 11.0 K/uL Final     Hemoglobin   Date Value Ref Range Status   07/04/2020 9.2 (L) 13.5 - 17.5 g/dL Final   07/03/2020 9.7 (L) 13.5 - 17.5 g/dL Final   07/02/2020 10.1 (L) 13.5 - 17.5 g/dL Final     Platelets   Date Value Ref Range Status   07/04/2020 325 135 - 450 K/uL Final   07/03/2020 349 135 - 450 K/uL Final   07/02/2020 429 135 - 450 K/uL Final     BMP:    Sodium   Date Value Ref Range Status   07/05/2020 143 136 - 145 mmol/L Final   07/04/2020 139 136 - 145 mmol/L Final   07/03/2020 139 136 - 145 mmol/L Final     Potassium   Date Value Ref Range Status   07/05/2020 3.4 (L) 3.5 - 5.1 mmol/L Final   07/04/2020 3.6 3.5 - 5.1 mmol/L Final   07/03/2020 4.7 3.5 - 5.1 mmol/L Final     Potassium reflex Magnesium   Date Value Ref Range Status   07/02/2020 4.8 3.5 - 5.1 mmol/L Final   07/01/2020 4.6 3.5 - 5.1 mmol/L Final   06/17/2018 4.4 3.5 - 5.1 mmol/L Final     CO2   Date Value Ref Range Status   07/05/2020 27 21 - 32 mmol/L Final   07/04/2020 25 21 - 32 mmol/L Final   07/03/2020 19 (L) 21 - 32 mmol/L Final     BUN   Date Value Ref Range Status   07/05/2020 26 (H) 7 - 20 mg/dL Final   07/04/2020 30 (H) 7 - 20 mg/dL Final   07/03/2020 24 (H) 7 - 20 mg/dL Final     CREATININE   Date Value Ref Range Status   07/05/2020 1.4 (H) 0.8 - 1.3 mg/dL Final   07/04/2020 1.5 (H) 0.8 - 1.3 mg/dL Final   07/03/2020 1.7 (H) 0.8 - 1.3 mg/dL Final     LIVR:   AST   Date Value Ref Range Status   07/04/2020 20 15 - 37 U/L Final   07/03/2020 22 15 - 37 U/L Final   07/16/2018 22 15 - 37 U/L Final     ALT   Date Value Ref Range Status   07/04/2020 7 (L) 10 - 40 U/L Final   07/03/2020 8 (L) 10 - 40 U/L Final   07/16/2018 9 (L) 10 - 40 U/L Final     PT/INR:   Total Protein   Date Value Ref Range Status   07/04/2020 5.8 (L) 6.4 - 8.2 g/dL Final   07/03/2020 6.2 (L) 6.4 - 8.2 g/dL Final   07/16/2018 6.3 (L) 6.4 - 8.2 g/dL Final   12/19/2012 7.4 6.4 - 8.2 gm/dl Final   06/21/2011 7.1 6.0 - 8.3 g/dL Final     INR Date Value Ref Range Status   06/17/2018 1.15 (H) 0.86 - 1.14 Final     Comment:     Effective 05/31/18 at 09:00am EST    Normal: 0.86 - 1.14  Therapeutic: 2.0 - 3.0  Pros. Valve: 2.5 - 3.5  AMI: 2.0 - 3.0     10/22/2015 0.98 0.85 - 1.16 Final     Comment:     Effective 07/29/2014 at 10am EST    Normal: 0.85 - 1.16  Therapeutic: 2.0 - 3.0  Pros. Valve: 2.5 - 3.5  AMI: 2.0 - 3.0     12/07/2013 0.97 0.85 - 1.15 Final     Comment:     Normal: 0.85 - 1.15  Therapeutic: 2.0 - 3.0  Pros. Valve: 2.5 - 3.5     APTT:   aPTT   Date Value Ref Range Status   06/17/2018 49.5 (H) 26.0 - 36.0 sec Final     Comment:     Therapeutic range: 54.0 - 90.0 sec    Effective 5-31-18 9:00am EST  Please note reference ranges have  changed for PTT Testing. 10/22/2015 31.2 23.1 - 36.1 sec Final     Comment:     Therapeutic range: 54.0 - 90.0 sec    Effective 7-29-14 10:00am EST  Please note reference ranges have  changed for PTT Testing.      12/08/2013 31.4 23.1 - 35.5 sec Final     Comment:     Therapeutic range: 54.0 - 90.0 sec     BNP:  No results found for: BNP  CARDIAC ENZYMES:  Total CK   Date Value Ref Range Status   07/16/2018 53 39 - 308 U/L Final   10/22/2015 41 39 - 308 U/L Final     Troponin   Date Value Ref Range Status   07/16/2018 0.03 (H) <0.01 ng/mL Final     Comment:     Methodology by Troponin T   10/22/2015 <0.01 <0.01 ng/mL Final     Comment:     Methodology by Troponin T   08/16/2015 <0.01 <0.01 ng/mL Final     Comment:     Methodology by Troponin T     FASTING LIPID PANEL:  Cholesterol, Total   Date Value Ref Range Status   06/21/2011 179 0 - 200 mg/dL Final     HDL   Date Value Ref Range Status   06/21/2011 70 >40 mg/dL Final     Triglycerides   Date Value Ref Range Status   06/21/2011 44 0 - 150 mg/dL Final     TSH:   Lab Results   Component Value Date    TSH 3.78 04/07/2017          Diagnostics:    EKG: Reviewed  Chest XRay:  Reveiwed    Assessment:  Patient Active Problem List    Diagnosis Date Noted    Hip

## 2020-07-05 NOTE — PROGRESS NOTES
Pt AAO x3 per this shift. Pt more alert and oriented that last night. Pt able to voice needs per this shift. Pt c/o of pain to lower back and right hip. Managed with medication per MD orders. Somewhat effective but ongoing. SPO2 going into to the 80s at a few times throughout the night easily brought back to baseline with qued breathing. Pt returned to 6L of 02 when sleeping for baseline SPO2. Pt toelrating Po fluids. No further needs voiced at this time. Fall precautions in place. Bed alarm on. Call light within reach. Will continue to monitor.

## 2020-07-05 NOTE — PROGRESS NOTES
Hospitalist Progress Note      PCP: Magan Sharp MD    Date of Admission: 7/1/2020    Chief Complaint: R Hip Pain    Hospital Course: The patient is a 80 y.o. male who presents to [facility] with right hip pain. Patient was at his nursing home when he tripped and fell. He states he was unable to stand so he was brought to the emergency department. In the emergency department he was found to have a right subcapital hip fracture. There were some bony lesions on his right hip as well. Orthopedic surgery was consulted and will plan for right hip surgery tomorrow along with intraoperative biopsy. Patient will be admitted to the hospital for further care and intervention. Patient eventually developed acute decompensated heart failure along with moderate bilateral pleural effusions and acute hypoxic respiratory failure. Pulmonology was consulted and a repeat COVID-19 test was ordered. Echocardiogram showed grade 3 diastolic dysfunction with a normal ejection fraction. 7/4 Patient appears to be doing better. We will continue to diurese the patient and hopefully have him tuned up for surgery on Monday. Subjective: Patient seen and examined. Oxygen demand trending down with diuretics. Now on 3L. Feeling good.  Hopefully surgical intervention tomorrow      Medications:  Reviewed    Infusion Medications   Scheduled Medications    enoxaparin  30 mg Subcutaneous Daily    azithromycin  250 mg Intravenous Q24H    cefTRIAXone (ROCEPHIN) IV  2 g Intravenous Q24H    ipratropium-albuterol  1 ampule Inhalation Q6H WA    furosemide  40 mg Intravenous BID    amiodarone  200 mg Oral Daily    amitriptyline  12.5 mg Oral Nightly    calcium-vitamin D  1 tablet Oral Daily with breakfast    divalproex  250 mg Oral Nightly    gabapentin  100 mg Oral BID    polyvinyl alcohol  1 drop Both Eyes BID    melatonin  4.5 mg Oral Nightly    metoprolol succinate  50 mg Oral Daily    tamsulosin  0.4 mg Oral Daily    sodium chloride flush  10 mL Intravenous 2 times per day     PRN Meds: HYDROcodone 5 mg - acetaminophen, sodium chloride flush, acetaminophen **OR** acetaminophen, polyethylene glycol, promethazine **OR** ondansetron, potassium chloride **OR** potassium alternative oral replacement **OR** potassium chloride, magnesium sulfate      Intake/Output Summary (Last 24 hours) at 7/5/2020 0805  Last data filed at 7/4/2020 2308  Gross per 24 hour   Intake 827.98 ml   Output 925 ml   Net -97.02 ml       Physical Exam Performed:    BP (!) 149/69   Pulse 71   Temp 97.8 °F (36.6 °C) (Axillary)   Resp 16   Ht 5' 8\" (1.727 m)   Wt 146 lb 2.6 oz (66.3 kg)   SpO2 97%   BMI 22.22 kg/m²     General appearance: No apparent distress, appears stated age and cooperative. HEENT: Pupils equal, round, and reactive to light. Conjunctivae/corneas clear. Neck: Supple, with full range of motion. No jugular venous distention. Trachea midline. Respiratory: Diminished breath sounds bilateral  Cardiovascular: Regular rate and rhythm with normal S1/S2   Abdomen: Soft, non-tender, non-distended with normal bowel sounds. Musculoskeletal: right hp decreased ROM  Skin: Skin color, texture, turgor normal.  No rashes or lesions. Neurologic:  Neurovascularly intact without any focal sensory/motor deficits. Cranial nerves: II-XII intact, grossly non-focal.  Psychiatric: Alert   Capillary Refill: Brisk,< 3 seconds   Peripheral Pulses: +2 palpable, equal bilaterally       Labs:   Recent Labs     07/03/20 0523 07/04/20 0452   WBC 18.7* 15.9*   HGB 9.7* 9.2*   HCT 30.8* 29.0*    325     Recent Labs     07/03/20 0523 07/04/20 0452    139   K 4.7 3.6    101   CO2 19* 25   BUN 24* 30*   CREATININE 1.7* 1.5*   CALCIUM 8.6 8.3   PHOS 4.1 3.8     Recent Labs     07/03/20 0523 07/04/20 0452   AST 22 20   ALT 8* 7*   BILITOT 0.7 0.5   ALKPHOS 74 72     No results for input(s): INR in the last 72 hours.   No results for input(s): Yamile Juan Francisco in the last 72 hours. Urinalysis:      Lab Results   Component Value Date    NITRU Negative 07/01/2020    WBCUA 1 07/01/2020    RBCUA 1 07/01/2020    BLOODU Negative 07/01/2020    SPECGRAV 1.013 07/01/2020    GLUCOSEU Negative 07/01/2020       Radiology:  XR CHEST 1 VW   Final Result   Moderate to large right-sided and small to moderate left pleural effusion,   mildly increased over the past 24 hours. Increased pulmonary edema. Focal new area of left perihilar atelectasis versus pneumonia. Increased bibasilar atelectasis         CT CHEST WO CONTRAST   Final Result   Moderate bilateral pleural effusions and bilateral airspace disease with   superimposed atelectasis or pneumonia at the lung bases. Scattered areas of   septal thickening are seen, compatible with a component of fluid   overload-pulmonary edema         NM LUNG SCAN PERFUSION ONLY   Final Result   Intermediate probability for pulmonary embolism. XR LUMBAR SPINE (2-3 VIEWS)   Final Result   Limited portable exam demonstrating no definite acute fracture         XR CHEST 1 VW   Final Result   Pulmonary vascular congestion. Suspect right pleural effusion. Right   perihilar ground-glass opacity may represent asymmetric edema or pneumonia         CT Head WO Contrast   Final Result   No acute intracranial abnormality. Diffuse atrophic changes with findings suggesting chronic microvascular   ischemia         CT CERVICAL SPINE WO CONTRAST   Final Result   No acute abnormality of the cervical spine.          XR HIP RIGHT (2-3 VIEWS)   Final Result   Subcapital right femoral neck fracture, may be pathologic as there are   possible destructive bony lesions within the right femoral neck                 Assessment/Plan:    Acute decompensated diastolic heart failure  Echocardiogram with ejection fraction 55% with grade 3 diastolic dysfunction  Continue IV Lasix  CHF nurse consult  BNP markedly elevated, chest x-ray and CT scan with bilateral pleural effusions    Acute respiratory failure with hypoxia  Likely secondary to bilateral pleural effusions  Titrate oxygen to keep saturations above 90%  Consider thoracentesis if no improvement with diuretics  Trending down with lasix, now on 3L    Acute right hip fracture status post fall  ORIF on Monday  Patient's respiratory status needs to improve before surgical intervention  Pain management    ARIANE on CKD stage III  Continue IV diuresis  Monitor    History of paroxysmal atrial fibrillation    Recent COVID-19 infection  Resolved    DVT Prophylaxis: lovenox  Diet: DIET LOW SODIUM 2 GM; 2000 ml  Code Status: DNR-CCA    PT/OT Eval Status:  Will need post op    Revised Cardiac Risk Index Score 1 points  Class II Risk  6.0 %  30-day risk of death, MI, or cardiac arrest      Dispo - Dawn Stahl MD

## 2020-07-05 NOTE — PROGRESS NOTES
Pulmonary Progress Note    Date of Admission: 7/1/2020   LOS: 4 days       CC:  Severe hypoxemia    Subjective:  Awake. Complaining of right hip pain. He was just cleaned up from having an accident. ROS:   Complaining hip pain. No chest pain   No nausea      PHYSICAL EXAM:   Blood pressure (!) 149/69, pulse 71, temperature 97.8 °F (36.6 °C), temperature source Axillary, resp. rate 16, height 5' 8\" (1.727 m), weight 146 lb 2.6 oz (66.3 kg), SpO2 94 %.'  Gen: No distress. ENT:   Resp: No accessory muscle use. No crackles. No wheezes. No rhonchi. CV: Regular rate. Regular rhythm. No murmur or rub. No edema. Skin: Warm, dry, normal texture and turgor. No nodule on exposed extremities. M/S: No cyanosis. No clubbing. No joint deformity. Psych: Awake.       Medications:    Scheduled Meds:   enoxaparin  30 mg Subcutaneous Daily    azithromycin  250 mg Intravenous Q24H    cefTRIAXone (ROCEPHIN) IV  2 g Intravenous Q24H    ipratropium-albuterol  1 ampule Inhalation Q6H WA    furosemide  40 mg Intravenous BID    amiodarone  200 mg Oral Daily    amitriptyline  12.5 mg Oral Nightly    calcium-vitamin D  1 tablet Oral Daily with breakfast    divalproex  250 mg Oral Nightly    gabapentin  100 mg Oral BID    polyvinyl alcohol  1 drop Both Eyes BID    melatonin  4.5 mg Oral Nightly    metoprolol succinate  50 mg Oral Daily    tamsulosin  0.4 mg Oral Daily    sodium chloride flush  10 mL Intravenous 2 times per day       Continuous Infusions:      PRN Meds:  HYDROcodone 5 mg - acetaminophen, sodium chloride flush, acetaminophen **OR** acetaminophen, polyethylene glycol, promethazine **OR** ondansetron, potassium chloride **OR** potassium alternative oral replacement **OR** potassium chloride, magnesium sulfate    Labs reviewed:  CBC:   Recent Labs     07/03/20 0523 07/04/20 0452   WBC 18.7* 15.9*   HGB 9.7* 9.2*   HCT 30.8* 29.0*   MCV 99.7 97.8    325     BMP:   Recent Labs     07/03/20 0523 07/04/20  0452 07/05/20  1001    139 143   K 4.7 3.6 3.4*    101 102   CO2 19* 25 27   PHOS 4.1 3.8 3.5   BUN 24* 30* 26*   CREATININE 1.7* 1.5* 1.4*     LIVER PROFILE:   Recent Labs     07/03/20  0523 07/04/20  0452   AST 22 20   ALT 8* 7*   BILITOT 0.7 0.5   ALKPHOS 74 72     PT/INR: No results for input(s): PROTIME, INR in the last 72 hours. APTT: No results for input(s): APTT in the last 72 hours. UA:  No results for input(s): NITRITE, COLORU, PHUR, LABCAST, WBCUA, RBCUA, MUCUS, TRICHOMONAS, YEAST, BACTERIA, CLARITYU, SPECGRAV, LEUKOCYTESUR, UROBILINOGEN, BILIRUBINUR, BLOODU, GLUCOSEU, AMORPHOUS in the last 72 hours. Invalid input(s): Mao Robles  No results for input(s): PH, PCO2, PO2 in the last 72 hours. Cx:      Films:      Assessment:     Acute hypoxemic respiratory failure with saturations less than 90% on room air  History of COVID-19  Hip fracture  Acute kidney injury         Plan:      Abnormal radiograph with acute hypoxemic respiratory failure  -Echocardiogram with diastolic dysfunction   -Lasix  - hypoxemia improving continue to wean to 90% saturation. -  ceftriaxone and azithromycin. Follow procalcitonin daily. WBC improving.   -Incentive spirometry    Acute kidney injury  -improved today.   -Continue Lasix for now      Hip fracture  -Cleared for surgery tomorrow. This note was transcribed using [id number] [email or web address]. Please disregard any translational errors.       Matt Ortega [facility] [phone]

## 2020-07-06 ENCOUNTER — APPOINTMENT (OUTPATIENT)
Dept: GENERAL RADIOLOGY | Age: 85
DRG: 469 | End: 2020-07-06
Payer: MEDICARE

## 2020-07-06 VITALS
DIASTOLIC BLOOD PRESSURE: 54 MMHG | RESPIRATION RATE: 11 BRPM | OXYGEN SATURATION: 98 % | TEMPERATURE: 98.6 F | SYSTOLIC BLOOD PRESSURE: 97 MMHG

## 2020-07-06 LAB
ALBUMIN SERPL-MCNC: 2.8 G/DL (ref 3.4–5)
ANION GAP SERPL CALCULATED.3IONS-SCNC: 13 MMOL/L (ref 3–16)
BASOPHILS ABSOLUTE: 0.1 K/UL (ref 0–0.2)
BASOPHILS RELATIVE PERCENT: 0.7 %
BUN BLDV-MCNC: 27 MG/DL (ref 7–20)
CALCIUM SERPL-MCNC: 8.5 MG/DL (ref 8.3–10.6)
CHLORIDE BLD-SCNC: 101 MMOL/L (ref 99–110)
CO2: 30 MMOL/L (ref 21–32)
CREAT SERPL-MCNC: 1.5 MG/DL (ref 0.8–1.3)
EOSINOPHILS ABSOLUTE: 1.3 K/UL (ref 0–0.6)
EOSINOPHILS RELATIVE PERCENT: 10 %
GFR AFRICAN AMERICAN: 53
GFR NON-AFRICAN AMERICAN: 44
GLUCOSE BLD-MCNC: 94 MG/DL (ref 70–99)
HCT VFR BLD CALC: 30.9 % (ref 40.5–52.5)
HEMOGLOBIN: 10.1 G/DL (ref 13.5–17.5)
LYMPHOCYTES ABSOLUTE: 1.2 K/UL (ref 1–5.1)
LYMPHOCYTES RELATIVE PERCENT: 9.7 %
MCH RBC QN AUTO: 31.5 PG (ref 26–34)
MCHC RBC AUTO-ENTMCNC: 32.6 G/DL (ref 31–36)
MCV RBC AUTO: 96.6 FL (ref 80–100)
MONOCYTES ABSOLUTE: 0.9 K/UL (ref 0–1.3)
MONOCYTES RELATIVE PERCENT: 7.4 %
NEUTROPHILS ABSOLUTE: 9.1 K/UL (ref 1.7–7.7)
NEUTROPHILS RELATIVE PERCENT: 72.2 %
PDW BLD-RTO: 15.1 % (ref 12.4–15.4)
PHOSPHORUS: 3.6 MG/DL (ref 2.5–4.9)
PLATELET # BLD: 341 K/UL (ref 135–450)
PMV BLD AUTO: 8.1 FL (ref 5–10.5)
POTASSIUM SERPL-SCNC: 3 MMOL/L (ref 3.5–5.1)
RBC # BLD: 3.2 M/UL (ref 4.2–5.9)
SODIUM BLD-SCNC: 144 MMOL/L (ref 136–145)
WBC # BLD: 12.7 K/UL (ref 4–11)

## 2020-07-06 PROCEDURE — 85025 COMPLETE CBC W/AUTO DIFF WBC: CPT

## 2020-07-06 PROCEDURE — 2720000010 HC SURG SUPPLY STERILE: Performed by: ORTHOPAEDIC SURGERY

## 2020-07-06 PROCEDURE — 6370000000 HC RX 637 (ALT 250 FOR IP): Performed by: INTERNAL MEDICINE

## 2020-07-06 PROCEDURE — 80069 RENAL FUNCTION PANEL: CPT

## 2020-07-06 PROCEDURE — 6360000002 HC RX W HCPCS: Performed by: INTERNAL MEDICINE

## 2020-07-06 PROCEDURE — 0SRR01A REPLACEMENT OF RIGHT HIP JOINT, FEMORAL SURFACE WITH METAL SYNTHETIC SUBSTITUTE, UNCEMENTED, OPEN APPROACH: ICD-10-PCS | Performed by: ORTHOPAEDIC SURGERY

## 2020-07-06 PROCEDURE — 6360000002 HC RX W HCPCS: Performed by: NURSE ANESTHETIST, CERTIFIED REGISTERED

## 2020-07-06 PROCEDURE — 2060000000 HC ICU INTERMEDIATE R&B

## 2020-07-06 PROCEDURE — 3700000000 HC ANESTHESIA ATTENDED CARE: Performed by: ORTHOPAEDIC SURGERY

## 2020-07-06 PROCEDURE — 71045 X-RAY EXAM CHEST 1 VIEW: CPT

## 2020-07-06 PROCEDURE — 94669 MECHANICAL CHEST WALL OSCILL: CPT

## 2020-07-06 PROCEDURE — 2700000000 HC OXYGEN THERAPY PER DAY

## 2020-07-06 PROCEDURE — 2580000003 HC RX 258: Performed by: PHYSICIAN ASSISTANT

## 2020-07-06 PROCEDURE — 2500000003 HC RX 250 WO HCPCS: Performed by: ORTHOPAEDIC SURGERY

## 2020-07-06 PROCEDURE — 88305 TISSUE EXAM BY PATHOLOGIST: CPT

## 2020-07-06 PROCEDURE — 7100000000 HC PACU RECOVERY - FIRST 15 MIN: Performed by: ORTHOPAEDIC SURGERY

## 2020-07-06 PROCEDURE — 2500000003 HC RX 250 WO HCPCS: Performed by: NURSE ANESTHETIST, CERTIFIED REGISTERED

## 2020-07-06 PROCEDURE — 2580000003 HC RX 258: Performed by: INTERNAL MEDICINE

## 2020-07-06 PROCEDURE — 94761 N-INVAS EAR/PLS OXIMETRY MLT: CPT

## 2020-07-06 PROCEDURE — P9045 ALBUMIN (HUMAN), 5%, 250 ML: HCPCS | Performed by: NURSE ANESTHETIST, CERTIFIED REGISTERED

## 2020-07-06 PROCEDURE — 94640 AIRWAY INHALATION TREATMENT: CPT

## 2020-07-06 PROCEDURE — 36415 COLL VENOUS BLD VENIPUNCTURE: CPT

## 2020-07-06 PROCEDURE — 2709999900 HC NON-CHARGEABLE SUPPLY: Performed by: ORTHOPAEDIC SURGERY

## 2020-07-06 PROCEDURE — 3600000015 HC SURGERY LEVEL 5 ADDTL 15MIN: Performed by: ORTHOPAEDIC SURGERY

## 2020-07-06 PROCEDURE — 99232 SBSQ HOSP IP/OBS MODERATE 35: CPT | Performed by: INTERNAL MEDICINE

## 2020-07-06 PROCEDURE — 2580000003 HC RX 258: Performed by: ANESTHESIOLOGY

## 2020-07-06 PROCEDURE — 6370000000 HC RX 637 (ALT 250 FOR IP): Performed by: PHYSICIAN ASSISTANT

## 2020-07-06 PROCEDURE — 7100000001 HC PACU RECOVERY - ADDTL 15 MIN: Performed by: ORTHOPAEDIC SURGERY

## 2020-07-06 PROCEDURE — 6360000002 HC RX W HCPCS: Performed by: ORTHOPAEDIC SURGERY

## 2020-07-06 PROCEDURE — 88311 DECALCIFY TISSUE: CPT

## 2020-07-06 PROCEDURE — 3600000005 HC SURGERY LEVEL 5 BASE: Performed by: ORTHOPAEDIC SURGERY

## 2020-07-06 PROCEDURE — 6360000002 HC RX W HCPCS: Performed by: PHYSICIAN ASSISTANT

## 2020-07-06 PROCEDURE — 2580000003 HC RX 258: Performed by: ORTHOPAEDIC SURGERY

## 2020-07-06 PROCEDURE — C1776 JOINT DEVICE (IMPLANTABLE): HCPCS | Performed by: ORTHOPAEDIC SURGERY

## 2020-07-06 PROCEDURE — 3700000001 HC ADD 15 MINUTES (ANESTHESIA): Performed by: ORTHOPAEDIC SURGERY

## 2020-07-06 PROCEDURE — 99233 SBSQ HOSP IP/OBS HIGH 50: CPT | Performed by: INTERNAL MEDICINE

## 2020-07-06 DEVICE — HEAD FEM DIA28MM +5MM OFFSET STD 12/14 TAPR ARTC EZ HIP MTL: Type: IMPLANTABLE DEVICE | Site: HIP | Status: FUNCTIONAL

## 2020-07-06 DEVICE — HEAD BPLR OD55MM ID28MM FEM HIP BLU POS ECC SELF CNTR: Type: IMPLANTABLE DEVICE | Site: HIP | Status: FUNCTIONAL

## 2020-07-06 DEVICE — STEM FEM SZ 15 L165MM NK L40.3MM 50MM HI OFFSET 125DEG HIP: Type: IMPLANTABLE DEVICE | Site: HIP | Status: FUNCTIONAL

## 2020-07-06 RX ORDER — ALBUMIN, HUMAN INJ 5% 5 %
SOLUTION INTRAVENOUS PRN
Status: DISCONTINUED | OUTPATIENT
Start: 2020-07-06 | End: 2020-07-06 | Stop reason: SDUPTHER

## 2020-07-06 RX ORDER — SODIUM CHLORIDE 9 MG/ML
INJECTION, SOLUTION INTRAVENOUS CONTINUOUS
Status: DISCONTINUED | OUTPATIENT
Start: 2020-07-06 | End: 2020-07-06

## 2020-07-06 RX ORDER — EPHEDRINE SULFATE/0.9% NACL/PF 50 MG/5 ML
SYRINGE (ML) INTRAVENOUS PRN
Status: DISCONTINUED | OUTPATIENT
Start: 2020-07-06 | End: 2020-07-06 | Stop reason: SDUPTHER

## 2020-07-06 RX ORDER — POTASSIUM CHLORIDE 7.45 MG/ML
10 INJECTION INTRAVENOUS
Status: COMPLETED | OUTPATIENT
Start: 2020-07-06 | End: 2020-07-06

## 2020-07-06 RX ORDER — SODIUM CHLORIDE 0.9 % (FLUSH) 0.9 %
10 SYRINGE (ML) INJECTION PRN
Status: DISCONTINUED | OUTPATIENT
Start: 2020-07-06 | End: 2020-07-10 | Stop reason: HOSPADM

## 2020-07-06 RX ORDER — SODIUM CHLORIDE 0.9 % (FLUSH) 0.9 %
10 SYRINGE (ML) INJECTION PRN
Status: DISCONTINUED | OUTPATIENT
Start: 2020-07-06 | End: 2020-07-06 | Stop reason: HOSPADM

## 2020-07-06 RX ORDER — FENTANYL CITRATE 50 UG/ML
INJECTION, SOLUTION INTRAMUSCULAR; INTRAVENOUS PRN
Status: DISCONTINUED | OUTPATIENT
Start: 2020-07-06 | End: 2020-07-06 | Stop reason: SDUPTHER

## 2020-07-06 RX ORDER — OXYCODONE HYDROCHLORIDE AND ACETAMINOPHEN 5; 325 MG/1; MG/1
1 TABLET ORAL PRN
Status: DISCONTINUED | OUTPATIENT
Start: 2020-07-06 | End: 2020-07-06 | Stop reason: HOSPADM

## 2020-07-06 RX ORDER — FENTANYL CITRATE 50 UG/ML
25 INJECTION, SOLUTION INTRAMUSCULAR; INTRAVENOUS EVERY 5 MIN PRN
Status: DISCONTINUED | OUTPATIENT
Start: 2020-07-06 | End: 2020-07-06 | Stop reason: HOSPADM

## 2020-07-06 RX ORDER — OXYCODONE HYDROCHLORIDE AND ACETAMINOPHEN 5; 325 MG/1; MG/1
2 TABLET ORAL PRN
Status: DISCONTINUED | OUTPATIENT
Start: 2020-07-06 | End: 2020-07-06 | Stop reason: HOSPADM

## 2020-07-06 RX ORDER — DEXAMETHASONE SODIUM PHOSPHATE 4 MG/ML
INJECTION, SOLUTION INTRA-ARTICULAR; INTRALESIONAL; INTRAMUSCULAR; INTRAVENOUS; SOFT TISSUE PRN
Status: DISCONTINUED | OUTPATIENT
Start: 2020-07-06 | End: 2020-07-06 | Stop reason: SDUPTHER

## 2020-07-06 RX ORDER — ROCURONIUM BROMIDE 10 MG/ML
INJECTION, SOLUTION INTRAVENOUS PRN
Status: DISCONTINUED | OUTPATIENT
Start: 2020-07-06 | End: 2020-07-06 | Stop reason: SDUPTHER

## 2020-07-06 RX ORDER — SODIUM CHLORIDE 0.9 % (FLUSH) 0.9 %
10 SYRINGE (ML) INJECTION EVERY 12 HOURS SCHEDULED
Status: DISCONTINUED | OUTPATIENT
Start: 2020-07-06 | End: 2020-07-10 | Stop reason: HOSPADM

## 2020-07-06 RX ORDER — PHENYLEPHRINE HCL IN 0.9% NACL 1 MG/10 ML
SYRINGE (ML) INTRAVENOUS PRN
Status: DISCONTINUED | OUTPATIENT
Start: 2020-07-06 | End: 2020-07-06 | Stop reason: SDUPTHER

## 2020-07-06 RX ORDER — MAGNESIUM HYDROXIDE 1200 MG/15ML
LIQUID ORAL CONTINUOUS PRN
Status: COMPLETED | OUTPATIENT
Start: 2020-07-06 | End: 2020-07-06

## 2020-07-06 RX ORDER — PROPOFOL 10 MG/ML
INJECTION, EMULSION INTRAVENOUS PRN
Status: DISCONTINUED | OUTPATIENT
Start: 2020-07-06 | End: 2020-07-06 | Stop reason: SDUPTHER

## 2020-07-06 RX ORDER — ONDANSETRON 2 MG/ML
4 INJECTION INTRAMUSCULAR; INTRAVENOUS
Status: DISCONTINUED | OUTPATIENT
Start: 2020-07-06 | End: 2020-07-06 | Stop reason: HOSPADM

## 2020-07-06 RX ORDER — ONDANSETRON 2 MG/ML
INJECTION INTRAMUSCULAR; INTRAVENOUS PRN
Status: DISCONTINUED | OUTPATIENT
Start: 2020-07-06 | End: 2020-07-06 | Stop reason: SDUPTHER

## 2020-07-06 RX ORDER — SODIUM CHLORIDE 0.9 % (FLUSH) 0.9 %
10 SYRINGE (ML) INJECTION EVERY 12 HOURS SCHEDULED
Status: DISCONTINUED | OUTPATIENT
Start: 2020-07-06 | End: 2020-07-06 | Stop reason: HOSPADM

## 2020-07-06 RX ORDER — LIDOCAINE HYDROCHLORIDE 20 MG/ML
INJECTION, SOLUTION EPIDURAL; INFILTRATION; INTRACAUDAL; PERINEURAL PRN
Status: DISCONTINUED | OUTPATIENT
Start: 2020-07-06 | End: 2020-07-06 | Stop reason: SDUPTHER

## 2020-07-06 RX ORDER — TRANEXAMIC ACID 100 MG/ML
INJECTION, SOLUTION INTRAVENOUS
Status: COMPLETED | OUTPATIENT
Start: 2020-07-06 | End: 2020-07-06

## 2020-07-06 RX ADMIN — DEXAMETHASONE SODIUM PHOSPHATE 4 MG: 4 INJECTION, SOLUTION INTRAMUSCULAR; INTRAVENOUS at 13:35

## 2020-07-06 RX ADMIN — GABAPENTIN 100 MG: 100 CAPSULE ORAL at 08:50

## 2020-07-06 RX ADMIN — AMITRIPTYLINE HYDROCHLORIDE 12.5 MG: 25 TABLET, FILM COATED ORAL at 22:20

## 2020-07-06 RX ADMIN — Medication 10 MEQ: at 08:42

## 2020-07-06 RX ADMIN — ALBUMIN (HUMAN) 250 ML: 12.5 SOLUTION INTRAVENOUS at 14:28

## 2020-07-06 RX ADMIN — CEFTRIAXONE 2 G: 2 INJECTION, POWDER, FOR SOLUTION INTRAMUSCULAR; INTRAVENOUS at 13:45

## 2020-07-06 RX ADMIN — POLYVINYL ALCOHOL 1 DROP: 14 SOLUTION/ DROPS OPHTHALMIC at 22:34

## 2020-07-06 RX ADMIN — AMIODARONE HYDROCHLORIDE 200 MG: 200 TABLET ORAL at 08:50

## 2020-07-06 RX ADMIN — ROCURONIUM BROMIDE 40 MG: 10 INJECTION INTRAVENOUS at 13:28

## 2020-07-06 RX ADMIN — HYDROCODONE BITARTRATE AND ACETAMINOPHEN 1 TABLET: 5; 325 TABLET ORAL at 08:50

## 2020-07-06 RX ADMIN — Medication 100 MCG: at 13:44

## 2020-07-06 RX ADMIN — HYDROCODONE BITARTRATE AND ACETAMINOPHEN 1 TABLET: 5; 325 TABLET ORAL at 16:21

## 2020-07-06 RX ADMIN — HYDROCODONE BITARTRATE AND ACETAMINOPHEN 1 TABLET: 5; 325 TABLET ORAL at 03:46

## 2020-07-06 RX ADMIN — Medication 10 MEQ: at 11:57

## 2020-07-06 RX ADMIN — GABAPENTIN 100 MG: 100 CAPSULE ORAL at 22:20

## 2020-07-06 RX ADMIN — TAMSULOSIN HYDROCHLORIDE 0.4 MG: 0.4 CAPSULE ORAL at 08:50

## 2020-07-06 RX ADMIN — AZITHROMYCIN DIHYDRATE 250 MG: 500 INJECTION, POWDER, LYOPHILIZED, FOR SOLUTION INTRAVENOUS at 13:14

## 2020-07-06 RX ADMIN — ACETAMINOPHEN 650 MG: 325 TABLET ORAL at 22:20

## 2020-07-06 RX ADMIN — PROPOFOL 100 MG: 10 INJECTION, EMULSION INTRAVENOUS at 13:28

## 2020-07-06 RX ADMIN — SODIUM CHLORIDE, PRESERVATIVE FREE 10 ML: 5 INJECTION INTRAVENOUS at 22:21

## 2020-07-06 RX ADMIN — Medication 200 MCG: at 14:28

## 2020-07-06 RX ADMIN — Medication 20 MG: at 14:17

## 2020-07-06 RX ADMIN — DIVALPROEX SODIUM 250 MG: 125 CAPSULE, COATED PELLETS ORAL at 22:20

## 2020-07-06 RX ADMIN — ROCURONIUM BROMIDE 10 MG: 10 INJECTION INTRAVENOUS at 14:29

## 2020-07-06 RX ADMIN — ONDANSETRON 4 MG: 2 INJECTION INTRAMUSCULAR; INTRAVENOUS at 14:22

## 2020-07-06 RX ADMIN — POLYVINYL ALCOHOL 1 DROP: 14 SOLUTION/ DROPS OPHTHALMIC at 08:51

## 2020-07-06 RX ADMIN — METOPROLOL SUCCINATE 50 MG: 50 TABLET, EXTENDED RELEASE ORAL at 08:50

## 2020-07-06 RX ADMIN — Medication 200 MCG: at 13:50

## 2020-07-06 RX ADMIN — LIDOCAINE HYDROCHLORIDE 5 ML: 20 INJECTION, SOLUTION EPIDURAL; INFILTRATION; INTRACAUDAL; PERINEURAL at 13:27

## 2020-07-06 RX ADMIN — Medication 10 MEQ: at 10:52

## 2020-07-06 RX ADMIN — SODIUM CHLORIDE, PRESERVATIVE FREE 10 ML: 5 INJECTION INTRAVENOUS at 08:50

## 2020-07-06 RX ADMIN — Medication 10 MG: at 14:39

## 2020-07-06 RX ADMIN — FUROSEMIDE 40 MG: 10 INJECTION, SOLUTION INTRAMUSCULAR; INTRAVENOUS at 18:41

## 2020-07-06 RX ADMIN — Medication 200 MCG: at 14:05

## 2020-07-06 RX ADMIN — SODIUM CHLORIDE, PRESERVATIVE FREE 10 ML: 5 INJECTION INTRAVENOUS at 22:34

## 2020-07-06 RX ADMIN — OYSTER SHELL CALCIUM WITH VITAMIN D 1 TABLET: 500; 200 TABLET, FILM COATED ORAL at 08:50

## 2020-07-06 RX ADMIN — Medication 10 MEQ: at 09:56

## 2020-07-06 RX ADMIN — IPRATROPIUM BROMIDE AND ALBUTEROL SULFATE 1 AMPULE: .5; 3 SOLUTION RESPIRATORY (INHALATION) at 07:56

## 2020-07-06 RX ADMIN — Medication 4.5 MG: at 22:23

## 2020-07-06 RX ADMIN — SODIUM CHLORIDE: 9 INJECTION, SOLUTION INTRAVENOUS at 13:25

## 2020-07-06 RX ADMIN — IPRATROPIUM BROMIDE AND ALBUTEROL SULFATE 1 AMPULE: .5; 3 SOLUTION RESPIRATORY (INHALATION) at 12:24

## 2020-07-06 RX ADMIN — SODIUM CHLORIDE: 9 INJECTION, SOLUTION INTRAVENOUS at 16:21

## 2020-07-06 RX ADMIN — FENTANYL CITRATE 100 MCG: 50 INJECTION INTRAMUSCULAR; INTRAVENOUS at 13:27

## 2020-07-06 RX ADMIN — Medication 20 MG: at 14:26

## 2020-07-06 RX ADMIN — FUROSEMIDE 40 MG: 10 INJECTION, SOLUTION INTRAMUSCULAR; INTRAVENOUS at 08:50

## 2020-07-06 ASSESSMENT — PAIN - FUNCTIONAL ASSESSMENT
PAIN_FUNCTIONAL_ASSESSMENT: 0-10
PAIN_FUNCTIONAL_ASSESSMENT: PREVENTS OR INTERFERES SOME ACTIVE ACTIVITIES AND ADLS
PAIN_FUNCTIONAL_ASSESSMENT: PREVENTS OR INTERFERES WITH MANY ACTIVE NOT PASSIVE ACTIVITIES
PAIN_FUNCTIONAL_ASSESSMENT: PREVENTS OR INTERFERES SOME ACTIVE ACTIVITIES AND ADLS

## 2020-07-06 ASSESSMENT — PULMONARY FUNCTION TESTS
PIF_VALUE: 22
PIF_VALUE: 18
PIF_VALUE: 23
PIF_VALUE: 22
PIF_VALUE: 22
PIF_VALUE: 23
PIF_VALUE: 21
PIF_VALUE: 9
PIF_VALUE: 1
PIF_VALUE: 18
PIF_VALUE: 21
PIF_VALUE: 15
PIF_VALUE: 22
PIF_VALUE: 22
PIF_VALUE: 4
PIF_VALUE: 8
PIF_VALUE: 12
PIF_VALUE: 22
PIF_VALUE: 6
PIF_VALUE: 15
PIF_VALUE: 22
PIF_VALUE: 22
PIF_VALUE: 24
PIF_VALUE: 16
PIF_VALUE: 21
PIF_VALUE: 21
PIF_VALUE: 22
PIF_VALUE: 25
PIF_VALUE: 12
PIF_VALUE: 1
PIF_VALUE: 16
PIF_VALUE: 21
PIF_VALUE: 23
PIF_VALUE: 22
PIF_VALUE: 0
PIF_VALUE: 14
PIF_VALUE: 20
PIF_VALUE: 23
PIF_VALUE: 22
PIF_VALUE: 22
PIF_VALUE: 19
PIF_VALUE: 22
PIF_VALUE: 21
PIF_VALUE: 22
PIF_VALUE: 0
PIF_VALUE: 12
PIF_VALUE: 22
PIF_VALUE: 22
PIF_VALUE: 23
PIF_VALUE: 11
PIF_VALUE: 22
PIF_VALUE: 12
PIF_VALUE: 15
PIF_VALUE: 2
PIF_VALUE: 23
PIF_VALUE: 12
PIF_VALUE: 13
PIF_VALUE: 16
PIF_VALUE: 21
PIF_VALUE: 21
PIF_VALUE: 17
PIF_VALUE: 22
PIF_VALUE: 21
PIF_VALUE: 23
PIF_VALUE: 21
PIF_VALUE: 22
PIF_VALUE: 22
PIF_VALUE: 16
PIF_VALUE: 22
PIF_VALUE: 22
PIF_VALUE: 16
PIF_VALUE: 18
PIF_VALUE: 22
PIF_VALUE: 16
PIF_VALUE: 21
PIF_VALUE: 21
PIF_VALUE: 5
PIF_VALUE: 12
PIF_VALUE: 19
PIF_VALUE: 23
PIF_VALUE: 12
PIF_VALUE: 16
PIF_VALUE: 24
PIF_VALUE: 20
PIF_VALUE: 12
PIF_VALUE: 22
PIF_VALUE: 0
PIF_VALUE: 21
PIF_VALUE: 13
PIF_VALUE: 16
PIF_VALUE: 18

## 2020-07-06 ASSESSMENT — PAIN DESCRIPTION - PAIN TYPE
TYPE: ACUTE PAIN
TYPE: ACUTE PAIN
TYPE: ACUTE PAIN;CHRONIC PAIN
TYPE: ACUTE PAIN

## 2020-07-06 ASSESSMENT — PAIN DESCRIPTION - ONSET
ONSET: ON-GOING

## 2020-07-06 ASSESSMENT — PAIN DESCRIPTION - ORIENTATION
ORIENTATION: RIGHT
ORIENTATION: LOWER
ORIENTATION: RIGHT
ORIENTATION: LOWER
ORIENTATION: RIGHT
ORIENTATION: LOWER

## 2020-07-06 ASSESSMENT — PAIN DESCRIPTION - FREQUENCY
FREQUENCY: CONTINUOUS

## 2020-07-06 ASSESSMENT — PAIN DESCRIPTION - PROGRESSION
CLINICAL_PROGRESSION: GRADUALLY IMPROVING
CLINICAL_PROGRESSION: NOT CHANGED
CLINICAL_PROGRESSION: GRADUALLY IMPROVING
CLINICAL_PROGRESSION: NOT CHANGED

## 2020-07-06 ASSESSMENT — ENCOUNTER SYMPTOMS: SHORTNESS OF BREATH: 1

## 2020-07-06 ASSESSMENT — PAIN SCALES - GENERAL
PAINLEVEL_OUTOF10: 8
PAINLEVEL_OUTOF10: 2
PAINLEVEL_OUTOF10: 0
PAINLEVEL_OUTOF10: 5
PAINLEVEL_OUTOF10: 7
PAINLEVEL_OUTOF10: 3

## 2020-07-06 ASSESSMENT — PAIN SCALES - WONG BAKER
WONGBAKER_NUMERICALRESPONSE: 6
WONGBAKER_NUMERICALRESPONSE: 0
WONGBAKER_NUMERICALRESPONSE: 0
WONGBAKER_NUMERICALRESPONSE: 6
WONGBAKER_NUMERICALRESPONSE: 2

## 2020-07-06 ASSESSMENT — PAIN DESCRIPTION - DESCRIPTORS
DESCRIPTORS: ACHING
DESCRIPTORS: ACHING;CONSTANT
DESCRIPTORS: ACHING
DESCRIPTORS: ACHING
DESCRIPTORS: ACHING;CONSTANT
DESCRIPTORS: ACHING
DESCRIPTORS: ACHING;CONSTANT

## 2020-07-06 ASSESSMENT — PAIN DESCRIPTION - LOCATION
LOCATION: BACK
LOCATION: HIP
LOCATION: BACK
LOCATION: HIP
LOCATION: BACK
LOCATION: HIP

## 2020-07-06 NOTE — PLAN OF CARE
Problem: Skin Integrity:  Goal: Will show no infection signs and symptoms  Description: Will show no infection signs and symptoms  Outcome: Ongoing  Goal: Absence of new skin breakdown  Description: Absence of new skin breakdown  Outcome: Ongoing     Problem: Falls - Risk of:  Goal: Will remain free from falls  Description: Will remain free from falls  Outcome: Ongoing     Problem: Pain:  Goal: Pain level will decrease  Description: Pain level will decrease  Outcome: Ongoing     Problem: OXYGENATION/RESPIRATORY FUNCTION  Goal: Patient will maintain patent airway  Outcome: Ongoing  Goal: Patient will achieve/maintain normal respiratory rate/effort  Description: Respiratory rate and effort will be within normal limits for the patient  Outcome: Ongoing     Problem: HEMODYNAMIC STATUS  Goal: Patient has stable vital signs and fluid balance  Outcome: Ongoing     Problem: FLUID AND ELECTROLYTE IMBALANCE  Goal: Fluid and electrolyte balance are achieved/maintained  Outcome: Ongoing     Problem: ACTIVITY INTOLERANCE/IMPAIRED MOBILITY  Goal: Mobility/activity is maintained at optimum level for patient  Outcome: Ongoing

## 2020-07-06 NOTE — PROGRESS NOTES
Pt transported to surgery, no dyspnea noted, had a breathing tx prior to transport, pt on 4LPM nasal cannula with SpO2 96%. Will continue to monitor and reassess.  Electronically signed by Dmitry Koo RN on 7/6/2020 at 1:17 PM

## 2020-07-06 NOTE — PROGRESS NOTES
Pulmonary Progress Note    Date of Admission: 7/1/2020   LOS: 5 days       CC:  Severe hypoxemia    Subjective:  Found patient on 10L NC today. No shortness of breath    ROS:   Complaining hip pain. No chest pain   No nausea      PHYSICAL EXAM:   Blood pressure (!) 148/63, pulse 74, temperature 97.8 °F (36.6 °C), temperature source Oral, resp. rate 16, height 5' 8\" (1.727 m), weight 146 lb 2.6 oz (66.3 kg), SpO2 100 %.'  Gen: No distress. ENT:   Resp: No accessory muscle use. No crackles. No wheezes. No rhonchi. CV: Regular rate. Regular rhythm. No murmur or rub. No edema. Skin: Warm, dry, normal texture and turgor. No nodule on exposed extremities. M/S: No cyanosis. No clubbing. No joint deformity. Psych: Awake.       Medications:    Scheduled Meds:   potassium chloride  10 mEq Intravenous Q1H    enoxaparin  30 mg Subcutaneous Daily    azithromycin  250 mg Intravenous Q24H    cefTRIAXone (ROCEPHIN) IV  2 g Intravenous Q24H    ipratropium-albuterol  1 ampule Inhalation Q6H WA    furosemide  40 mg Intravenous BID    amiodarone  200 mg Oral Daily    amitriptyline  12.5 mg Oral Nightly    calcium-vitamin D  1 tablet Oral Daily with breakfast    divalproex  250 mg Oral Nightly    gabapentin  100 mg Oral BID    polyvinyl alcohol  1 drop Both Eyes BID    melatonin  4.5 mg Oral Nightly    metoprolol succinate  50 mg Oral Daily    tamsulosin  0.4 mg Oral Daily    sodium chloride flush  10 mL Intravenous 2 times per day       Continuous Infusions:      PRN Meds:  HYDROcodone 5 mg - acetaminophen, sodium chloride flush, acetaminophen **OR** acetaminophen, polyethylene glycol, promethazine **OR** ondansetron, potassium chloride **OR** potassium alternative oral replacement **OR** potassium chloride, magnesium sulfate    Labs reviewed:  CBC:   Recent Labs     07/04/20  0452 07/06/20  0459   WBC 15.9* 12.7*   HGB 9.2* 10.1*   HCT 29.0* 30.9*   MCV 97.8 96.6    341     BMP:   Recent Labs 07/04/20  0452 07/05/20  1001 07/06/20  0459    143 144   K 3.6 3.4* 3.0*    102 101   CO2 25 27 30   PHOS 3.8 3.5 3.6   BUN 30* 26* 27*   CREATININE 1.5* 1.4* 1.5*     LIVER PROFILE:   Recent Labs     07/04/20  0452   AST 20   ALT 7*   BILITOT 0.5   ALKPHOS 72     PT/INR: No results for input(s): PROTIME, INR in the last 72 hours. APTT: No results for input(s): APTT in the last 72 hours. UA:  No results for input(s): NITRITE, COLORU, PHUR, LABCAST, WBCUA, RBCUA, MUCUS, TRICHOMONAS, YEAST, BACTERIA, CLARITYU, SPECGRAV, LEUKOCYTESUR, UROBILINOGEN, BILIRUBINUR, BLOODU, GLUCOSEU, AMORPHOUS in the last 72 hours. Invalid input(s): Meldon Boom  No results for input(s): PH, PCO2, PO2 in the last 72 hours. Cx:      Films:      Assessment:     Acute hypoxemic respiratory failure with saturations less than 90% on room air  History of COVID-19  Hip fracture  Acute kidney injury         Plan:      Abnormal radiograph with acute hypoxemic respiratory failure  -Echocardiogram with diastolic dysfunction   -Lasix, negative 3.7L. No fevers. Procalcitonin  Decreasing  chest X-ray has been stable with pulmonary edema  - it was not clear why he needed to be increased to 10L . Moved to room air while in room. Jimmy 84%. Placed on 3L NC and increased to 93%. discussed with nurse. Will monitor. -  ceftriaxone and azithromycin. X 7 and 5 days.      -Incentive spirometry    Acute kidney injury     -Continue Lasix for now      Hip fracture  -Cleared for surgery today. - cardiology following as well. This note was transcribed using [id number] [email or web address]. Please disregard any translational errors.       Matt rOtega Pulmonary, [facility] [phone]

## 2020-07-06 NOTE — PROGRESS NOTES
[facility] Orthopedic Surgery   Progress Note      S/P :  SUBJECTIVE  In bed. Alert and oriented. Parke tx on right leg. Nasal O2 on. Does not appear in distress. . Pain is   described in right hip and with the intensity of moderate. Pain is described as aching. OBJECTIVE              Physical                      VITALS:  /64   Pulse 71   Temp 97.7 °F (36.5 °C) (Oral)   Resp 18   Ht 5' 8\" (1.727 m)   Wt 146 lb 2.6 oz (66.3 kg)   SpO2 95%   BMI 22.22 kg/m²                     MUSCULOSKELETAL:  right foot NVI. Wiggles toes to command. Pedal pulses are palpable.                    NEUROLOGIC:                                  Sensory:  Touch:  Right Lower Extremity:  normal                                            Data       CBC:   Lab Results   Component Value Date    WBC 12.7 07/06/2020    RBC 3.20 07/06/2020    HGB 10.1 07/06/2020    HCT 30.9 07/06/2020    MCV 96.6 07/06/2020    MCH 31.5 07/06/2020    MCHC 32.6 07/06/2020    RDW 15.1 07/06/2020     07/06/2020    MPV 8.1 07/06/2020        WBC:    Lab Results   Component Value Date    WBC 12.7 07/06/2020        Hemoglobin/Hematocrit:    Lab Results   Component Value Date    HGB 10.1 07/06/2020    HCT 30.9 07/06/2020        PT/INR:    Lab Results   Component Value Date    PROTIME 13.1 06/17/2018    INR 1.15 06/17/2018              Current Inpatient Medications             Current Facility-Administered Medications: potassium chloride 10 mEq/100 mL IVPB (Peripheral Line), 10 mEq, Intravenous, Q1H  HYDROcodone-acetaminophen (NORCO) 5-325 MG per tablet 1 tablet, 1 tablet, Oral, Q4H PRN  enoxaparin (LOVENOX) injection 30 mg, 30 mg, Subcutaneous, Daily  azithromycin (ZITHROMAX) 250 mg in dextrose 5 % 250 mL IVPB, 250 mg, Intravenous, Q24H  cefTRIAXone (ROCEPHIN) 2 g IVPB in D5W 50ml minibag, 2 g, Intravenous, Q24H  ipratropium-albuterol (DUONEB) nebulizer solution 1 ampule, 1 ampule, Inhalation, Q6H WA  furosemide (LASIX) injection 40 mg, 40 mg, Intravenous, BID  amiodarone (CORDARONE) tablet 200 mg, 200 mg, Oral, Daily  amitriptyline (ELAVIL) tablet 12.5 mg, 12.5 mg, Oral, Nightly  calcium-vitamin D 500-200 MG-UNIT per tablet 1 tablet, 1 tablet, Oral, Daily with breakfast  divalproex (DEPAKOTE SPRINKLE) capsule 250 mg, 250 mg, Oral, Nightly  gabapentin (NEURONTIN) capsule 100 mg, 100 mg, Oral, BID  polyvinyl alcohol (LIQUIFILM TEARS) 1.4 % ophthalmic solution 1 drop, 1 drop, Both Eyes, BID  melatonin tablet 4.5 mg, 4.5 mg, Oral, Nightly  metoprolol succinate (TOPROL XL) extended release tablet 50 mg, 50 mg, Oral, Daily  tamsulosin (FLOMAX) capsule 0.4 mg, 0.4 mg, Oral, Daily  sodium chloride flush 0.9 % injection 10 mL, 10 mL, Intravenous, 2 times per day  sodium chloride flush 0.9 % injection 10 mL, 10 mL, Intravenous, PRN  acetaminophen (TYLENOL) tablet 650 mg, 650 mg, Oral, Q6H PRN **OR** acetaminophen (TYLENOL) suppository 650 mg, 650 mg, Rectal, Q6H PRN  polyethylene glycol (GLYCOLAX) packet 17 g, 17 g, Oral, Daily PRN  promethazine (PHENERGAN) tablet 12.5 mg, 12.5 mg, Oral, Q6H PRN **OR** ondansetron (ZOFRAN) injection 4 mg, 4 mg, Intravenous, Q6H PRN  potassium chloride (KLOR-CON M) extended release tablet 40 mEq, 40 mEq, Oral, PRN **OR** potassium bicarb-citric acid (EFFER-K) effervescent tablet 40 mEq, 40 mEq, Oral, PRN **OR** potassium chloride 10 mEq/100 mL IVPB (Peripheral Line), 10 mEq, Intravenous, PRN  magnesium sulfate 2 g in 50 mL IVPB premix, 2 g, Intravenous, PRN    ASSESSMENT AND PLAN    Fall  Right hip pain  Right femoral neck fx  Plan right  Hip hemiartrhoplasty today per DR Daily Zhang, NPO for surgery  Hypoxia, improved.    Hold Lovenox for surgery today      Aliya Shea [facility]  7/6/2020  8:31 AM

## 2020-07-06 NOTE — ANESTHESIA PRE PROCEDURE
Department of Anesthesiology  Preprocedure Note       Name:  Jim Serna   Age:  80 y.o.  :  1932                                          MRN:  [id number]         Date:  2020      Surgeon: Nicole Wright):  Anahy Sierra MD    Procedure: Procedure(s):  RIGHT HIP HEMIARTHROPLASTY    Medications prior to admission:   Prior to Admission medications    Medication Sig Start Date End Date Taking? Authorizing Provider   hypromellose (ISOPTO TEARS) 0.5 % ophthalmic solution Place 1 drop into both eyes 2 times daily    Yes Historical Provider, MD   amitriptyline (ELAVIL) 25 MG tablet Take 12.5 mg by mouth nightly   Yes Historical Provider, MD   gabapentin (NEURONTIN) 100 MG capsule Take 100 mg by mouth 2 times daily. Yes Historical Provider, MD   furosemide (LASIX) 20 MG tablet Take 20 mg by mouth daily X 3 days until  for edema   Yes Historical Provider, MD   melatonin 5 MG TABS tablet Take 5 mg by mouth nightly   Yes Historical Provider, MD   acetaminophen (TYLENOL) 500 MG tablet Take 1,000 mg by mouth nightly   Yes Historical Provider, MD   vitamin B-12 (CYANOCOBALAMIN) 1000 MCG tablet Take 1,000 mcg by mouth daily   Yes Historical Provider, MD   divalproex (DEPAKOTE SPRINKLE) 125 MG capsule Take 250 mg by mouth nightly   Yes Historical Provider, MD   amiodarone (CORDARONE) 200 MG tablet Take 1 tablet by mouth daily 18  Yes Serenity Champagne MD   metoprolol succinate (TOPROL XL) 50 MG extended release tablet Take 1 tablet by mouth daily 18  Yes Serenity Champagne MD   tamsulosin (FLOMAX) 0.4 MG capsule TAKE ONE CAPSULE BY MOUTH DAILY 17  Yes Serenity Champagne MD   calcium-vitamin D (OSCAL-500) 500-200 MG-UNIT per tablet Take 1 tablet by mouth daily.      Yes Historical Provider, MD   acetaminophen (TYLENOL) 325 MG tablet Take 650 mg by mouth every 6 hours as needed for Pain     Historical Provider, MD   Nutritional Supplements (ENSURE PO) Take 1 Can by mouth daily    Historical Provider, MD Current medications:    Current Facility-Administered Medications   Medication Dose Route Frequency Provider Last Rate Last Dose    0.9 % sodium chloride infusion   Intravenous Continuous Favio Shepard MD        sodium chloride flush 0.9 % injection 10 mL  10 mL Intravenous 2 times per day Favio Shepard MD        sodium chloride flush 0.9 % injection 10 mL  10 mL Intravenous PRN Favio Shepard MD        methylnaltrexone (RELISTOR) injection 12 mg  12 mg Subcutaneous Q12H PRN Ayaan Burnett MD        HYDROcodone-acetaminophen [facility]) 5-325 MG per tablet 1 tablet  1 tablet Oral Q4H PRN Yadira Alaniz MD   1 tablet at 07/06/20 0850    enoxaparin (LOVENOX) injection 30 mg  30 mg Subcutaneous Daily MIKE George - CNP   30 mg at 07/05/20 1012    azithromycin (ZITHROMAX) 250 mg in dextrose 5 % 250 mL IVPB  250 mg Intravenous Q24H Campos Vazquez MD   Stopped at 07/05/20 1457    cefTRIAXone (ROCEPHIN) 2 g IVPB in D5W 50ml minibag  2 g Intravenous Q24H Campos Vazquez MD   Stopped at 07/05/20 1619    ipratropium-albuterol (DUONEB) nebulizer solution 1 ampule  1 ampule Inhalation Q6H STACI Christine DO   1 ampule at 07/06/20 1224    furosemide (LASIX) injection 40 mg  40 mg Intravenous BID Vernal Gitelman, MD   40 mg at 07/06/20 0850    amiodarone (CORDARONE) tablet 200 mg  200 mg Oral Daily Yadira Alaniz MD   200 mg at 07/06/20 0850    amitriptyline (ELAVIL) tablet 12.5 mg  12.5 mg Oral Nightly Yadira Alaniz MD   12.5 mg at 07/05/20 2054    calcium-vitamin D 500-200 MG-UNIT per tablet 1 tablet  1 tablet Oral Daily with breakfast Yadira Alaniz MD   1 tablet at 07/06/20 0850    divalproex (DEPAKOTE SPRINKLE) capsule 250 mg  250 mg Oral Nightly Yadira Alaniz MD   250 mg at 07/05/20 2054    gabapentin (NEURONTIN) capsule 100 mg  100 mg Oral BID Yadira Alaniz MD   100 mg at 07/06/20 0850    polyvinyl alcohol (LIQUIFILM TEARS) 1.4 % ophthalmic solution 1 drop  1 drop Both Eyes BID Joy Brown MD   1 drop at 07/06/20 0851    melatonin tablet 4.5 mg  4.5 mg Oral Nightly Joy Brown MD   4.5 mg at 07/05/20 2054    metoprolol succinate (TOPROL XL) extended release tablet 50 mg  50 mg Oral Daily Joy Brown MD   50 mg at 07/06/20 0850    tamsulosin (FLOMAX) capsule 0.4 mg  0.4 mg Oral Daily Joy Brown MD   0.4 mg at 07/06/20 0850    sodium chloride flush 0.9 % injection 10 mL  10 mL Intravenous 2 times per day Joy Brown MD   10 mL at 07/06/20 0850    sodium chloride flush 0.9 % injection 10 mL  10 mL Intravenous PRN Joy Brown MD   10 mL at 07/05/20 2054    acetaminophen (TYLENOL) tablet 650 mg  650 mg Oral Q6H PRN Joy Brown MD   650 mg at 07/05/20 1012    Or    acetaminophen (TYLENOL) suppository 650 mg  650 mg Rectal Q6H PRN Joy Brown MD        polyethylene glycol [facility]) packet 17 g  17 g Oral Daily PRN Joy Brown MD        promethazine (PHENERGAN) tablet 12.5 mg  12.5 mg Oral Q6H PRN Joy Brown MD        Or    [facility]ans[facility]ron [facility] injection 4 mg  4 mg Intravenous Q6H PRN Joy Brown MD        potassium chloride (KLOR-CON M) extended release tablet 40 mEq  40 mEq Oral PRN Joy Brown MD        Or    potassium bicarb-citric acid (EFFER-K) effervescent tablet 40 mEq  40 mEq Oral PRN Joy Brown MD        Or    potassium chloride 10 mEq/100 mL IVPB (Peripheral Line)  10 mEq Intravenous PRN Joy Brown MD        magnesium sulfate 2 g in 50 mL IVPB premix  2 g Intravenous PRN Joy Brwon MD           Allergies:  No Known Allergies    Problem List:    Patient Active Problem List   Diagnosis Code    Hypertension, benign I10    Osteopenia M85.80    Spinal stenosis M48.00    Mixed hyperlipidemia E78.2    Radial nerve injury S54.20XA    Hip fracture, left ([address] S72.002A    Arthritis of left knee M17.12    Trigger finger, acquired M65.30    Paroxysmal atrial fibrillation (HCC) I48.0    Frequent PVCs I49.3    Alcohol abuse F10.10    Hypokalemia E87.6    Syncope and collapse R55    Hyperlipidemia E78.5    PAF (paroxysmal atrial fibrillation) ([facility]) I48.0    Essential hypertension I10    Macrocytic anemia D53.9    Closed fracture of ramus of left pubis ([facility]) S32.592A    Contusion of left hip S70. 02XA    SVT (supraventricular tachycardia) ([facility]) I47.1    Accidental fall W19. XXXA    Closed head injury with concussion S06. 0X9A    Dementia ([address] F03.90    Hyponatremia E87.1    Scalp laceration S01. 01XA    Tachycardia R00.0    Closed nondisplaced fracture of proximal phalanx of left ring finger S62.645A    Compression fracture of T12 vertebra ([facility]) S22.080A    Hyponatremia E87.1    Closed fracture of neck of right femur ([facility]) S72.001A    Closed fracture of right hip ([facility]) S72.001A    Acute respiratory failure with hypoxia ([facility]) J96.01    COVID-19 U07.1       Past Medical History:        Diagnosis Date    Alcohol abuse     Arthritis of left knee     advanced    Atrial fibrillation (HCC)     HTN (hypertension)     Hyperlipidemia     PAF (paroxysmal atrial fibrillation) ([facility])        Past Surgical History:        Procedure Laterality Date    HIP FRACTURE SURGERY Left        Social History:    Social History     Tobacco Use    Smoking status: Never Smoker    Smokeless tobacco: Never Used   Substance Use Topics    Alcohol use: Yes     Comment: 20 months sober                                 Counseling given: No      Vital Signs (Current):   Vitals:    07/06/20 0759 07/06/20 0833 07/06/20 1125 07/06/20 1235   BP:  (!) 148/63 (!) 158/68    Pulse:  74 81    Resp: 18 16 16 18   Temp:  97.8 °F (36.6 °C) 98.4 °F (36.9 °C)    TempSrc:  Oral Oral    SpO2: 95% 100% 91% 96%   Weight:       Height:                                                  BP Readings from Last 3 Encounters:   07/06/20 (!) 158/68   07/18/18 (!) 171/89   07/12/18 (!) 110/53       NPO Status: Time of last liquid consumption: 2330 Time of last solid consumption: 2130                        Date of last liquid consumption: 07/05/20                        Date of last solid food consumption: 07/05/20    BMI:   Wt Readings from Last 3 Encounters:   07/06/20 146 lb 2.6 oz (66.3 kg)   07/18/18 143 lb 8.3 oz (65.1 kg)   07/12/18 130 lb (59 kg)     Body mass index is 22.22 kg/m². CBC:   Lab Results   Component Value Date    WBC 12.7 07/06/2020    RBC 3.20 07/06/2020    HGB 10.1 07/06/2020    HCT 30.9 07/06/2020    MCV 96.6 07/06/2020    RDW 15.1 07/06/2020     07/06/2020       CMP:   Lab Results   Component Value Date     07/06/2020    K 3.0 07/06/2020    K 4.8 07/02/2020     07/06/2020    CO2 30 07/06/2020    BUN 27 07/06/2020    CREATININE 1.5 07/06/2020    GFRAA 53 07/06/2020    GFRAA >60 12/19/2012    AGRATIO 0.8 07/04/2020    LABGLOM 44 07/06/2020    LABGLOM 72.3 06/21/2011    GLUCOSE 94 07/06/2020    GLUCOSE 106 06/21/2011    PROT 5.8 07/04/2020    PROT 7.4 12/19/2012    CALCIUM 8.5 07/06/2020    BILITOT 0.5 07/04/2020    ALKPHOS 72 07/04/2020    AST 20 07/04/2020    ALT 7 07/04/2020       POC Tests: No results for input(s): POCGLU, POCNA, POCK, POCCL, POCBUN, POCHEMO, POCHCT in the last 72 hours.     Coags:   Lab Results   Component Value Date    PROTIME 13.1 06/17/2018    INR 1.15 06/17/2018    APTT 49.5 06/17/2018       HCG (If Applicable): No results found for: PREGTESTUR, PREGSERUM, HCG, HCGQUANT     ABGs:   Lab Results   Component Value Date    PHART 7.410 07/02/2020    PO2ART 65.3 07/02/2020    TIN6LUG 34.6 07/02/2020    ACO8JZM 21.9 07/02/2020    BEART -2.4 07/02/2020    J7TDEDDM 93.3 07/02/2020        Type & Screen (If Applicable):  No results found for: LABABO, LABRH    Drug/Infectious Status (If Applicable):  No results found for: HIV, HEPCAB    COVID-19 Screening (If Applicable):   Lab Results   Component Value Date    COVID19 Not Detected 07/01/2020         Anesthesia Evaluation  Patient summary reviewed no history of anesthetic complications:   Airway: Mallampati: III  TM distance: >3 FB   Neck ROM: limited  Mouth opening: > = 3 FB Dental:      Comment: Poor dentition    Pulmonary:   (+) shortness of breath:  decreased breath sounds,      (-) COPD                          ROS comment: Hx covid19  pulm note reviewed  Hypoxic resp failure    Cardiovascular:    (+) hypertension:, dysrhythmias: atrial fibrillation and SVT, hyperlipidemia      ECG reviewed  Rhythm: irregular  Rate: abnormal  Echocardiogram reviewed    Cleared by cardiology           ROS comment:  Summary   There is mild concentric left ventricular hypertrophy with normal size and   systolic function. Ejection fraction is visually estimated to be 60-65%. Grade III diastolic dysfunction with elevated LV filling pressures. The right ventricle is normal in size and function. Moderate biatrial enlargement appreciated. Mild to moderate tricuspid regurgitation. Mild mitral regurgitation is present. The right coronary cusp is restricted. The aortic valve is thickened/calcified with decreased leaflet mobility   consistent with mild aortic stenosis. Mean gradient 12mmHg and velocity   2.65m/s. Signature      ------------------------------------------------------------------   Electronically signed by Sundeep Gan MD   (Interpreting physician) on 07/03/2020 at 04:44 PM   ------------------------------------------------------------------     Neuro/Psych:   (+) neuromuscular disease:, psychiatric history (dementia):   (-) seizures, TIA and CVA           GI/Hepatic/Renal:   (+) renal disease:,      (-) GERD, PUD and hepatitis       Endo/Other:    (+) blood dyscrasia::., electrolyte abnormalities, . Abdominal:           Vascular:                                    Anesthesia Plan      general     ASA 4     (DNR suspended.  Discussed post op ventilation with daughter Nahid Alexis (Dr. Yanick Longoria) )  Induction: intravenous. MIPS: Postoperative opioids intended and Prophylactic antiemetics administered. Anesthetic plan and risks discussed with patient, child/children and healthcare power of . Plan discussed with CRNA. This pre-anesthesia assessment may be used as a history and physical.    DOS STAFF ADDENDUM:    Pt seen and examined, chart reviewed (including anesthesia, drug and allergy history). No interval changes to history and physical examination. Anesthetic plan, risks, benefits, alternatives, and personnel involved discussed with patient. Patient verbalized an understanding and agrees to proceed.       Annette Zapata MD  July 6, 2020  12:59 PM      Annette Zapata MD   7/6/2020

## 2020-07-06 NOTE — PROGRESS NOTES
Pt AAo x3 per this shift except to date. Pt c/o of pain to right hip and lower hip. Managed with warm blanket and repositioning, Prn medication per MD orders. Somewhat effective but ongoing. Pt removing nasal cannula from nose at time and SPO2 going into the low 80s. Breathing exercise used Pt being raised to 10L  f high flow at time d/t Ineffective to be able to raise SPO2 with deep breathing. Pt remain NPO per MD orders, Damon in place draining dark yellow clear urine. [place] traction remains in place. No further needs voiced at this time. Fall precautions in place. Bed alarm on. Call light within reach. Will continue to monitor.

## 2020-07-06 NOTE — PROGRESS NOTES
breakfast    divalproex  250 mg Oral Nightly    gabapentin  100 mg Oral BID    polyvinyl alcohol  1 drop Both Eyes BID    melatonin  4.5 mg Oral Nightly    metoprolol succinate  50 mg Oral Daily    tamsulosin  0.4 mg Oral Daily    sodium chloride flush  10 mL Intravenous 2 times per day     PRN Meds: HYDROcodone 5 mg - acetaminophen, sodium chloride flush, acetaminophen **OR** acetaminophen, polyethylene glycol, promethazine **OR** ondansetron, potassium chloride **OR** potassium alternative oral replacement **OR** potassium chloride, magnesium sulfate      Intake/Output Summary (Last 24 hours) at 7/6/2020 0844  Last data filed at 7/6/2020 0631  Gross per 24 hour   Intake 520 ml   Output 950 ml   Net -430 ml       Physical Exam Performed:    BP (!) 148/63   Pulse 74   Temp 97.8 °F (36.6 °C) (Oral)   Resp 16   Ht 5' 8\" (1.727 m)   Wt 146 lb 2.6 oz (66.3 kg)   SpO2 100%   BMI 22.22 kg/m²     General appearance: No apparent distress, appears stated age and cooperative. HEENT: Pupils equal, round, and reactive to light. Conjunctivae/corneas clear. Neck: Supple, with full range of motion. No jugular venous distention. Trachea midline. Respiratory: Diminished breath sounds bilateral  Cardiovascular: Regular rate and rhythm with normal S1/S2   Abdomen: Soft, non-tender, non-distended with normal bowel sounds. Musculoskeletal: right hp decreased ROM  Skin: Skin color, texture, turgor normal.  No rashes or lesions. Neurologic:  Neurovascularly intact without any focal sensory/motor deficits.  Cranial nerves: II-XII intact, grossly non-focal.  Psychiatric: Alert   Capillary Refill: Brisk,< 3 seconds   Peripheral Pulses: +2 palpable, equal bilaterally       Labs:   Recent Labs     07/04/20  0452 07/06/20  0459   WBC 15.9* 12.7*   HGB 9.2* 10.1*   HCT 29.0* 30.9*    341     Recent Labs     07/04/20  0452 07/05/20  1001 07/06/20  0459    143 144   K 3.6 3.4* 3.0*    102 101   CO2 25 27 30 femoral neck         XR CHEST PORTABLE    (Results Pending)           Assessment/Plan:    Acute decompensated diastolic heart failure  Echocardiogram with ejection fraction 55% with grade 3 diastolic dysfunction  Continue IV Lasix, -3.3 L so far  CHF nurse consult  BNP markedly elevated, chest x-ray and CT scan with bilateral pleural effusions    Acute respiratory failure with hypoxia  Likely secondary to bilateral pleural effusions  Titrate oxygen to keep saturations above 90%  Consider thoracentesis if no improvement with diuretics  Trending down with lasix, now on 3L    Acute right hip fracture status post fall  ORIF today  Patient's respiratory status needs to improve before surgical intervention  Pain management    ARIANE on CKD stage III  Continue IV diuresis  Monitor    History of paroxysmal atrial fibrillation    Recent COVID-19 infection  Resolved    DVT Prophylaxis: lovenox  Diet: Diet NPO, After Midnight  Code Status: DNR-CCA    PT/OT Eval Status:  Will need post op    Revised Cardiac Risk Index Score 1 points  Class II Risk  6.0 %  30-day risk of death, MI, or cardiac arrest      Dispo - Trang Gottlieb MD

## 2020-07-06 NOTE — PROGRESS NOTES
Pt returned to the floor from PACU, pt drowsy but yelling out in pain and slightly combative & confused. Pt given prescribed analgesic for R hip pain, pt wants to be left alone, call light within reach and bed alarm in place. Ice pack applied to R hip and pt moving BLE independently, abductor pillow remains in place. .Will continue to monitor and reassess.  Electronically signed by Sierra Kauffman RN on 7/6/2020 at 4:40 PM

## 2020-07-06 NOTE — OP NOTE
Provider Notified to Remove No 7/6/2020  8:46 AM       Findings: Femoral head collapse of the right hip    Detailed Description of Procedure:   PATIENT NAME:        Sis Dooley OF BIRTH:     9/18/1932   MEDICAL RECORD# [id number]  SURGERY DATE:      7/6/2020  SURGEON:                Yani Posey  FIRST-ASSISTANT: Elizabeth Hidalgo PA-C    PREOPERATIVE DIAGNOSIS: Right hip avascular necrosis #2 right hip femoral neck fracture. POSTOPERATIVE DIAGNOSIS:  Right hip avascular necrosis #2 right hip femoral neck fracture. PROCEDURE: right hip bipolar hemiarthroplasty. ANESTHESIA: general.     IV FLUIDS: Crystalloid. ESTIMATED BLOOD LOSS: 300 ml. COMPLICATIONS: None. The patient tolerated the procedure quite well. Components:Depuy Corail KLA fiber metal mid coat size 15 femur and a size  55 +5 bipolar head. INDICATIONS: The patient is a 80 y.o. male who slipped and fell injuring the Right hip. The patient was unable to bear weight. X-rays confirmed a right femoral neck fracture and AVN. Due to the nature of the injury the patient was ultimately cleared and scheduled for a bipolar hemiarthroplasty. DETAILS OF PROCEDURE: The patient was identified preoperatively. The proper extremity was then marked. The patient was then taken to the operating room and general anesthesia was administered by Anesthesia. The patient was positioned in the lateral decubitus position on the vac pack. Appropriate pre op antibiotics were administered per SCIP. The right hip, pelvis and lower extremity were then prepped with ChloraPrep in standard fashion. We then draped out in standard fashion. We sealed off the skin with Arthuro Pander. We were then ready for incision. Using standard posterolateral skin incision we incised the skin and coagulated all   bleeders with Bovie electrocautery. We then dissected down and split the   gluteofemoral fascia in line with the muscle fibers.  We then retracted both   anteriorly and posteriorly with the Charnley retractor. The sciatic nerve   was protected at all times. We then identified the piriformis. This was   tagged and released. We then took down the remainder of the short external   rotators in standard fashion. We then went ahead and Td the capsule in   standard fashion. We then expressed the proximal portion of the femur out of   the wound. We did make a neck cut based on templating. We removed all bone   laterally. We used our Charnley to open up the medullary canal of the femur. We then used our trochanteric router to ream out the bone laterally. We   then sequentially reamed up to 15 mm. We then sequentially broached up to 15   mm. This worked out extremely well. We got nice fit and fill of the femoral   canal. We used our calcar planer to plane down the calcar. We did remove   the femoral head without difficulty. The femoral head sized to 55. We then   trialed the hip. Leg length appeared symmetric. The hip was   extremely stable, both anteriorly and posteriorly. We then went ahead and   removed all trial components. We irrigated the acetabulum and the femoral   canal rather copiously. We then were ready for insertion of the actual components. The components were seated without difficulty. We maintained approximately 15 to 20 degrees of anteversion. This worked out extremely well. The hip was reduced and   again the hip was stable. We closed the capsule with interrupted   figure-of-eight #1 Vicryl stitches. We then closed the piriformis back with   interrupted vertical mattress #1 Vicryl stitches. We then closed the gluteal   femoral fascia with interrupted figure-of-eight #1 Vicryl stitches. We then   closed the subcutaneous tissues with interrupted 2-0 simple Vicryl stitches and strata fix. We then closed the skin with prineo. Sterile dressings were applied. There   were no complications. The patient was put on an abductor pillow. Electronically signed by Nayeli Parsons MD on 7/6/2020 at 2:36 PM

## 2020-07-06 NOTE — PROGRESS NOTES
Pt briefly wakes to name. Oral airway removed. Pt moves toes to both feet to request but will not do pushes and pulls to feet. Shakes head \"no\" to pain. Quickly back to sleep.

## 2020-07-06 NOTE — PROGRESS NOTES
[facility] 81  Inpatient Progress Note    CC:        Preoperative clearance      HPI:   This is a 80 y.o. male is status post right hip fracture following a fall. Needs open reduction and fixation. On admission his proBNP was markedly elevated with chest x-ray evidence of CHF with bilateral effusions. His left heart filling pressures were also elevated at that time. He has been treated for diastolic heart failure for the last several days with 3.7 L out. He is presently on 3 L of oxygen with 100% oxygen saturations. EKG shows A. fib with a controlled rate on amiodarone. The patient has history of paroxysmal A. fib. His last echo showed EF of 55 to 60% with mild aortic stenosis. Review of Systems -   Constitutional: Negative for weight gain/loss; malaise, fever  Respiratory: Negative for Asthma;  cough and hemoptysis  Cardiovascular: Negative for palpitations,dizziness   Gastrointestinal: Negative for abd.pain; constipation/diarrhea;    Genitourinary: Negative for stones; hematuria; frequency hesitancy  Integumentt: Negative for rash or pruritis  Hematologic/lymphatic: Negative for blood dyscrasia; leukemia/lymphoma  Musculoskeletal: Negative for Connective tissue disease  Neurological:  Negative for Seizure   Behavioral/Psych:Negative for Bipolar disorder, Schizophrenia; Dementia  Endocrine: negative for thyroid, parathyroid disease      Intake/Output Summary (Last 24 hours) at 7/6/2020 1020  Last data filed at 7/6/2020 0842  Gross per 24 hour   Intake 550 ml   Output 950 ml   Net -400 ml         Physical Examination:    BP (!) 148/63   Pulse 74   Temp 97.8 °F (36.6 °C) (Oral)   Resp 16   Ht 5' 8\" (1.727 m)   Wt 146 lb 2.6 oz (66.3 kg)   SpO2 100%   BMI 22.22 kg/m²   HEENT:  Face: Atraumatic, Conjunctiva: Pink; non icteric,  Mucous Memb:  Moist, No thyromegaly or Lymphadenopathy  Respiratory:  Resp Assessment: Normal, Resp Auscultation: Clear  Cardiovascular:   Auscultation: nl S1 & S2, Palpation:  Nl PMI;  No heaves or thrills, JVP:  normal  Abdomen: Soft, non-tender, Normal bowel sounds,  No organomegaly  Extremities: No Cyanosis or Clubbing; Edema none  Neurological: Oriented to time, place, and person, Non-anxious  Psychiatric: Normal mood and affect  Skin: Warm and dry,  No rash seen    Current Facility-Administered Medications: potassium chloride 10 mEq/100 mL IVPB (Peripheral Line), 10 mEq, Intravenous, Q1H  HYDROcodone-acetaminophen (NORCO) 5-325 MG per tablet 1 tablet, 1 tablet, Oral, Q4H PRN  enoxaparin (LOVENOX) injection 30 mg, 30 mg, Subcutaneous, Daily  azithromycin (ZITHROMAX) 250 mg in dextrose 5 % 250 mL IVPB, 250 mg, Intravenous, Q24H  cefTRIAXone (ROCEPHIN) 2 g IVPB in D5W 50ml minibag, 2 g, Intravenous, Q24H  ipratropium-albuterol (DUONEB) nebulizer solution 1 ampule, 1 ampule, Inhalation, Q6H WA  furosemide (LASIX) injection 40 mg, 40 mg, Intravenous, BID  amiodarone (CORDARONE) tablet 200 mg, 200 mg, Oral, Daily  amitriptyline (ELAVIL) tablet 12.5 mg, 12.5 mg, Oral, Nightly  calcium-vitamin D 500-200 MG-UNIT per tablet 1 tablet, 1 tablet, Oral, Daily with breakfast  divalproex (DEPAKOTE SPRINKLE) capsule 250 mg, 250 mg, Oral, Nightly  gabapentin (NEURONTIN) capsule 100 mg, 100 mg, Oral, BID  polyvinyl alcohol (LIQUIFILM TEARS) 1.4 % ophthalmic solution 1 drop, 1 drop, Both Eyes, BID  melatonin tablet 4.5 mg, 4.5 mg, Oral, Nightly  metoprolol succinate (TOPROL XL) extended release tablet 50 mg, 50 mg, Oral, Daily  tamsulosin (FLOMAX) capsule 0.4 mg, 0.4 mg, Oral, Daily  sodium chloride flush 0.9 % injection 10 mL, 10 mL, Intravenous, 2 times per day  sodium chloride flush 0.9 % injection 10 mL, 10 mL, Intravenous, PRN  acetaminophen (TYLENOL) tablet 650 mg, 650 mg, Oral, Q6H PRN **OR** acetaminophen (TYLENOL) suppository 650 mg, 650 mg, Rectal, Q6H PRN  polyethylene glycol (GLYCOLAX) packet 17 g, 17 g, Oral, Daily PRN  promethazine (PHENERGAN) tablet 12.5 mg, 12.5 mg, Oral,

## 2020-07-06 NOTE — PROGRESS NOTES
To pacu from OR. Pt asleep with oral airway in place. Dressing to right hip dry and intact. Right pedal pulse palpable. IV infusing. Monitor in sinus rhythm with first degree AV block.

## 2020-07-06 NOTE — ANESTHESIA POSTPROCEDURE EVALUATION
Department of Anesthesiology  Postprocedure Note    Patient: Chanell Sanches  MRN: [id number]  [place]: 9/18/1932  Date of evaluation: 7/6/2020  Time:  4:11 PM     Procedure Summary     Date:  07/06/20 Room / Location:  [address]    Anesthesia Start:  2505 Anesthesia Stop:  5972    Procedure:  RIGHT HIP HEMIARTHROPLASTY (Right Hip) Diagnosis:  (.)    Surgeon:  Jaqueline Seo MD Responsible Provider:  Chivo Biswas MD    Anesthesia Type:  general ASA Status:  4          Anesthesia Type: general    Yevgeniy Phase I: Yevgeniy Score: 8    Yevgeniy Phase II:      Last vitals: Reviewed and per EMR flowsheets.        Anesthesia Post Evaluation    Patient location during evaluation: PACU  Patient participation: complete - patient participated  Level of consciousness: awake and confused  Airway patency: patent  Nausea & Vomiting: no nausea and no vomiting  Complications: no  Cardiovascular status: hemodynamically stable  Respiratory status: acceptable  Hydration status: stable

## 2020-07-07 ENCOUNTER — APPOINTMENT (OUTPATIENT)
Dept: GENERAL RADIOLOGY | Age: 85
DRG: 469 | End: 2020-07-07
Payer: MEDICARE

## 2020-07-07 LAB
ALBUMIN SERPL-MCNC: 2.6 G/DL (ref 3.4–5)
ANION GAP SERPL CALCULATED.3IONS-SCNC: 12 MMOL/L (ref 3–16)
BASOPHILS ABSOLUTE: 0 K/UL (ref 0–0.2)
BASOPHILS RELATIVE PERCENT: 0.2 %
BUN BLDV-MCNC: 36 MG/DL (ref 7–20)
CALCIUM SERPL-MCNC: 8.3 MG/DL (ref 8.3–10.6)
CHLORIDE BLD-SCNC: 103 MMOL/L (ref 99–110)
CO2: 25 MMOL/L (ref 21–32)
CREAT SERPL-MCNC: 1.7 MG/DL (ref 0.8–1.3)
EOSINOPHILS ABSOLUTE: 0 K/UL (ref 0–0.6)
EOSINOPHILS RELATIVE PERCENT: 0.1 %
GFR AFRICAN AMERICAN: 46
GFR NON-AFRICAN AMERICAN: 38
GLUCOSE BLD-MCNC: 140 MG/DL (ref 70–99)
HCT VFR BLD CALC: 26.9 % (ref 40.5–52.5)
HEMOGLOBIN: 8.8 G/DL (ref 13.5–17.5)
LYMPHOCYTES ABSOLUTE: 0.4 K/UL (ref 1–5.1)
LYMPHOCYTES RELATIVE PERCENT: 3.7 %
MCH RBC QN AUTO: 31.6 PG (ref 26–34)
MCHC RBC AUTO-ENTMCNC: 32.7 G/DL (ref 31–36)
MCV RBC AUTO: 96.9 FL (ref 80–100)
MONOCYTES ABSOLUTE: 0.5 K/UL (ref 0–1.3)
MONOCYTES RELATIVE PERCENT: 4.8 %
NEUTROPHILS ABSOLUTE: 9.9 K/UL (ref 1.7–7.7)
NEUTROPHILS RELATIVE PERCENT: 91.2 %
PDW BLD-RTO: 14.9 % (ref 12.4–15.4)
PHOSPHORUS: 5.1 MG/DL (ref 2.5–4.9)
PLATELET # BLD: 314 K/UL (ref 135–450)
PMV BLD AUTO: 7.9 FL (ref 5–10.5)
POTASSIUM SERPL-SCNC: 3.8 MMOL/L (ref 3.5–5.1)
RBC # BLD: 2.78 M/UL (ref 4.2–5.9)
SODIUM BLD-SCNC: 140 MMOL/L (ref 136–145)
WBC # BLD: 10.9 K/UL (ref 4–11)

## 2020-07-07 PROCEDURE — 2580000003 HC RX 258: Performed by: PHYSICIAN ASSISTANT

## 2020-07-07 PROCEDURE — 94761 N-INVAS EAR/PLS OXIMETRY MLT: CPT

## 2020-07-07 PROCEDURE — 6370000000 HC RX 637 (ALT 250 FOR IP): Performed by: PHYSICIAN ASSISTANT

## 2020-07-07 PROCEDURE — 73502 X-RAY EXAM HIP UNI 2-3 VIEWS: CPT

## 2020-07-07 PROCEDURE — 6360000002 HC RX W HCPCS: Performed by: PHYSICIAN ASSISTANT

## 2020-07-07 PROCEDURE — 97530 THERAPEUTIC ACTIVITIES: CPT

## 2020-07-07 PROCEDURE — 99233 SBSQ HOSP IP/OBS HIGH 50: CPT | Performed by: INTERNAL MEDICINE

## 2020-07-07 PROCEDURE — 97535 SELF CARE MNGMENT TRAINING: CPT

## 2020-07-07 PROCEDURE — 85025 COMPLETE CBC W/AUTO DIFF WBC: CPT

## 2020-07-07 PROCEDURE — 80069 RENAL FUNCTION PANEL: CPT

## 2020-07-07 PROCEDURE — 94640 AIRWAY INHALATION TREATMENT: CPT

## 2020-07-07 PROCEDURE — 97166 OT EVAL MOD COMPLEX 45 MIN: CPT

## 2020-07-07 PROCEDURE — 97162 PT EVAL MOD COMPLEX 30 MIN: CPT

## 2020-07-07 PROCEDURE — 36415 COLL VENOUS BLD VENIPUNCTURE: CPT

## 2020-07-07 PROCEDURE — 2060000000 HC ICU INTERMEDIATE R&B

## 2020-07-07 PROCEDURE — 2700000000 HC OXYGEN THERAPY PER DAY

## 2020-07-07 PROCEDURE — 94669 MECHANICAL CHEST WALL OSCILL: CPT

## 2020-07-07 RX ORDER — FUROSEMIDE 40 MG/1
40 TABLET ORAL DAILY
Status: DISCONTINUED | OUTPATIENT
Start: 2020-07-08 | End: 2020-07-10

## 2020-07-07 RX ADMIN — CEFTRIAXONE 2 G: 2 INJECTION, POWDER, FOR SOLUTION INTRAMUSCULAR; INTRAVENOUS at 15:41

## 2020-07-07 RX ADMIN — IPRATROPIUM BROMIDE AND ALBUTEROL SULFATE 1 AMPULE: .5; 3 SOLUTION RESPIRATORY (INHALATION) at 20:08

## 2020-07-07 RX ADMIN — METOPROLOL SUCCINATE 50 MG: 50 TABLET, EXTENDED RELEASE ORAL at 09:34

## 2020-07-07 RX ADMIN — FUROSEMIDE 40 MG: 10 INJECTION, SOLUTION INTRAMUSCULAR; INTRAVENOUS at 09:33

## 2020-07-07 RX ADMIN — ENOXAPARIN SODIUM 30 MG: 30 INJECTION SUBCUTANEOUS at 09:33

## 2020-07-07 RX ADMIN — DIVALPROEX SODIUM 250 MG: 125 CAPSULE, COATED PELLETS ORAL at 22:49

## 2020-07-07 RX ADMIN — TAMSULOSIN HYDROCHLORIDE 0.4 MG: 0.4 CAPSULE ORAL at 09:33

## 2020-07-07 RX ADMIN — AZITHROMYCIN DIHYDRATE 250 MG: 500 INJECTION, POWDER, LYOPHILIZED, FOR SOLUTION INTRAVENOUS at 14:31

## 2020-07-07 RX ADMIN — POLYVINYL ALCOHOL 1 DROP: 14 SOLUTION/ DROPS OPHTHALMIC at 22:49

## 2020-07-07 RX ADMIN — Medication 4.5 MG: at 22:49

## 2020-07-07 RX ADMIN — OYSTER SHELL CALCIUM WITH VITAMIN D 1 TABLET: 500; 200 TABLET, FILM COATED ORAL at 09:33

## 2020-07-07 RX ADMIN — GABAPENTIN 100 MG: 100 CAPSULE ORAL at 22:48

## 2020-07-07 RX ADMIN — SODIUM CHLORIDE, PRESERVATIVE FREE 10 ML: 5 INJECTION INTRAVENOUS at 09:33

## 2020-07-07 RX ADMIN — AMITRIPTYLINE HYDROCHLORIDE 12.5 MG: 25 TABLET, FILM COATED ORAL at 22:49

## 2020-07-07 RX ADMIN — IPRATROPIUM BROMIDE AND ALBUTEROL SULFATE 1 AMPULE: .5; 3 SOLUTION RESPIRATORY (INHALATION) at 13:22

## 2020-07-07 RX ADMIN — GABAPENTIN 100 MG: 100 CAPSULE ORAL at 09:34

## 2020-07-07 RX ADMIN — POLYVINYL ALCOHOL 1 DROP: 14 SOLUTION/ DROPS OPHTHALMIC at 09:33

## 2020-07-07 RX ADMIN — METHYLNALTREXONE BROMIDE 12 MG: 12 INJECTION, SOLUTION SUBCUTANEOUS at 05:52

## 2020-07-07 RX ADMIN — AMIODARONE HYDROCHLORIDE 200 MG: 200 TABLET ORAL at 09:33

## 2020-07-07 RX ADMIN — IPRATROPIUM BROMIDE AND ALBUTEROL SULFATE 1 AMPULE: .5; 3 SOLUTION RESPIRATORY (INHALATION) at 08:11

## 2020-07-07 RX ADMIN — SODIUM CHLORIDE, PRESERVATIVE FREE 10 ML: 5 INJECTION INTRAVENOUS at 22:52

## 2020-07-07 ASSESSMENT — PAIN SCALES - GENERAL
PAINLEVEL_OUTOF10: 0
PAINLEVEL_OUTOF10: 0

## 2020-07-07 ASSESSMENT — PAIN SCALES - WONG BAKER: WONGBAKER_NUMERICALRESPONSE: 0

## 2020-07-07 NOTE — PROGRESS NOTES
0.9 % injection 10 mL, 10 mL, Intravenous, PRN  HYDROmorphone (DILAUDID) injection 0.25 mg, 0.25 mg, Intravenous, Q3H PRN **OR** HYDROmorphone (DILAUDID) injection 0.5 mg, 0.5 mg, Intravenous, Q3H PRN  HYDROcodone-acetaminophen (NORCO) 5-325 MG per tablet 1 tablet, 1 tablet, Oral, Q4H PRN  enoxaparin (LOVENOX) injection 30 mg, 30 mg, Subcutaneous, Daily  cefTRIAXone (ROCEPHIN) 2 g IVPB in D5W 50ml minibag, 2 g, Intravenous, Q24H  ipratropium-albuterol (DUONEB) nebulizer solution 1 ampule, 1 ampule, Inhalation, Q6H WA  amiodarone (CORDARONE) tablet 200 mg, 200 mg, Oral, Daily  amitriptyline (ELAVIL) tablet 12.5 mg, 12.5 mg, Oral, Nightly  calcium-vitamin D 500-200 MG-UNIT per tablet 1 tablet, 1 tablet, Oral, Daily with breakfast  divalproex (DEPAKOTE SPRINKLE) capsule 250 mg, 250 mg, Oral, Nightly  gabapentin (NEURONTIN) capsule 100 mg, 100 mg, Oral, BID  polyvinyl alcohol (LIQUIFILM TEARS) 1.4 % ophthalmic solution 1 drop, 1 drop, Both Eyes, BID  melatonin tablet 4.5 mg, 4.5 mg, Oral, Nightly  metoprolol succinate (TOPROL XL) extended release tablet 50 mg, 50 mg, Oral, Daily  tamsulosin (FLOMAX) capsule 0.4 mg, 0.4 mg, Oral, Daily  sodium chloride flush 0.9 % injection 10 mL, 10 mL, Intravenous, 2 times per day  sodium chloride flush 0.9 % injection 10 mL, 10 mL, Intravenous, PRN  acetaminophen (TYLENOL) tablet 650 mg, 650 mg, Oral, Q6H PRN **OR** acetaminophen (TYLENOL) suppository 650 mg, 650 mg, Rectal, Q6H PRN  polyethylene glycol (GLYCOLAX) packet 17 g, 17 g, Oral, Daily PRN  promethazine (PHENERGAN) tablet 12.5 mg, 12.5 mg, Oral, Q6H PRN **OR** ondansetron (ZOFRAN) injection 4 mg, 4 mg, Intravenous, Q6H PRN  potassium chloride (KLOR-CON M) extended release tablet 40 mEq, 40 mEq, Oral, PRN **OR** potassium bicarb-citric acid (EFFER-K) effervescent tablet 40 mEq, 40 mEq, Oral, PRN **OR** potassium chloride 10 mEq/100 mL IVPB (Peripheral Line), 10 mEq, Intravenous, PRN  magnesium sulfate 2 g in 50 mL IVPB premix, 2 g, Intravenous, PRN         Labs:   Recent Labs     07/06/20  0459 07/07/20  0622   WBC 12.7* 10.9   HGB 10.1* 8.8*   HCT 30.9* 26.9*    314     Recent Labs     07/06/20  0459 07/07/20  0622    140   K 3.0* 3.8   CO2 30 25   BUN 27* 36*   CREATININE 1.5* 1.7*   LABGLOM 44* 38*     No results for input(s): BNP in the last 72 hours. No results for input(s): PROTIME, INR in the last 72 hours. No results for input(s): CKTOTAL, CKMB, CKMBINDEX, TROPONINI in the last 72 hours. Telemetry Monitor:       EKG:   A. fib, incomplete right bundle branch block, prolonged QT    Chest X-Ray:      ECHO: 7/2020   There is mild conc. LVH: EF ~ 60-65%. Grade III diastolic dysfunction with elevated LV filling pressures. The right ventricle is normal in size and function. Moderate biatrial enlargement appreciated. Mild to moderate tricuspid regurgitation. Mild mitral regurgitation is present. The right coronary cusp is restricted. Mild aortic stenosis.  Mean gradient 12mmHg          ASSESSMENT AND PLAN:      Preoperative cardiac clearance  Admitted with a fall needing ORIF of right hip  On admission he had CHF, likely diastolic  Has diuresed 3.7 L  On 3 L of oxygen now; laying flat  May proceed with surgery; risk of perioperative cardiac event is low      Atrial fibrillation  Patient is in A. fib despite being on amiodarone  Not sure how long has he been in A. fib since I last saw him 2 years ago  Probably will discontinue and aim at rate control      7/7/2020  Status post hip surgery without complications  Laying flat breathing comfortably  Creatinine up at 1.7 from 1.4  We will hold tomorrow's Rosa Bosch M.D  7/7/2020

## 2020-07-07 NOTE — PLAN OF CARE
Problem: Skin Integrity:  Goal: Will show no infection signs and symptoms  Description: Will show no infection signs and symptoms  7/7/2020 0958 by Albaro Rogers RN  Outcome: Ongoing     Problem: Skin Integrity:  Goal: Absence of new skin breakdown  Description: Absence of new skin breakdown  Outcome: Ongoing     Problem: Falls - Risk of:  Goal: Will remain free from falls  Description: Will remain free from falls  7/7/2020 0958 by Albaro Rogers RN  Outcome: Ongoing     Problem: Pain:  Goal: Pain level will decrease  Description: Pain level will decrease  7/7/2020 0958 by Albaro Rogers RN  Outcome: Ongoing     Problem: OXYGENATION/RESPIRATORY FUNCTION  Goal: Patient will maintain patent airway  7/7/2020 0958 by lAbaro Rogers RN  Outcome: Ongoing     Problem: OXYGENATION/RESPIRATORY FUNCTION  Goal: Patient will achieve/maintain normal respiratory rate/effort  Description: Respiratory rate and effort will be within normal limits for the patient  7/7/2020 0958 by Albaro Rogers RN  Outcome: Ongoing     Problem: HEMODYNAMIC STATUS  Goal: Patient has stable vital signs and fluid balance  7/7/2020 0958 by Albaro Rogers RN  Outcome: Ongoing     Problem: FLUID AND ELECTROLYTE IMBALANCE  Goal: Fluid and electrolyte balance are achieved/maintained  7/7/2020 0958 by Albaro Rogers RN  Outcome: Ongoing     Problem: ACTIVITY INTOLERANCE/IMPAIRED MOBILITY  Goal: Mobility/activity is maintained at optimum level for patient  7/7/2020 0958 by Albaro Rogers RN  Outcome: Ongoing

## 2020-07-07 NOTE — PROGRESS NOTES
Occupational Therapy   Occupational Therapy Initial Assessment    Date: 2020   Patient Name: Remer Cowden  MRN: [id number]     : 1932    Date of Service: 2020    Discharge Recommendations:  3-5 sessions per week, Patient would benefit from continued therapy after discharge  OT Equipment Recommendations  Equipment Needed: No  Other: defer to next level of care     Remer Cowden scored a 11 on the AM-PAC ADL Inpatient form. Current research shows that an AM-PAC score of 17 or less is typically not associated with a discharge to the patient's home setting. Based on the patient's AM-PAC score and their current ADL deficits, it is recommended that the patient have 3-5 sessions per week of Occupational Therapy at d/c to increase the patient's independence. Please see assessment section for further patient specific details. If patient discharges prior to next session this note will serve as a discharge summary. Please see below for the latest assessment towards goals. Assessment   Performance deficits / Impairments: Decreased functional mobility ; Decreased ADL status; Decreased strength;Decreased cognition;Decreased endurance;Decreased balance  Assessment: Pt is an 80 y.o. male admitted from [address] [place] [address] after falling OOB. Pt with right hip femoral neck fracture, underwent right hip hemiarthroplasty on 2020, now posterior hip precautions. PTA, pt had assist with all ADLs, IADLs, and reports using w/c for past 5 years but unable to recall how he transferred to the w/c. He is a poor historian. Currently, pt requires max x2 bed mobility and sit<>stand transfers with use of lift equipment, and anticiapte up to dependent assist for LB ADLs. Will continue to see pt while in acute hospital, and will plan for cotx with PT at this time. Pt will benefit from continued skilled OT services at low-moderate intensity 3-5x/week at d/c prior to return to LTC.   Treatment Diagnosis: impaired fxl transfers/mobility, ADLs, strength, balance  Prognosis: Fair;Guarded  Decision Making: Medium Complexity  History: Pt is an 80 y.o. male admitted from [address] [place] [address] after falling OOB. Pt with right hip femoral neck fracture, underwent right hip hemiarthroplasty on 7/6/2020, now posterior hip precautions. PTA, pt had assist with all ADLs, IADLs, and reports using w/c for past 5 years but unable to recall how he transferred to the w/c. He is a poor historian. Exam: impaired fxl transfers/mobility, ADLs, strength, balance  Assistance / Modification: Currently, pt requires max x2 bed mobility and sit<>stand transfers with use of lift equipment, and anticiapte up to dependent assist for LB ADLs. OT Education: OT Role;Plan of Care;Precautions;Transfer Training  Barriers to Learning: hearing, cognition  REQUIRES OT FOLLOW UP: Yes  Activity Tolerance  Activity Tolerance: Patient Tolerated treatment well;Treatment limited secondary to decreased cognition  Safety Devices  Safety Devices in place: Yes  Type of devices: Call light within reach; Chair alarm in place;Gait belt;Left in chair;Nurse notified(IRVING Shukla) aware)           Patient Diagnosis(es): The primary encounter diagnosis was Closed fracture of neck of right femur, initial encounter ([facility] [address] Diagnoses of Fall from bed, initial encounter, Injury of head, initial encounter, and Facial laceration, initial encounter were also pertinent to this visit. has a past medical history of Alcohol abuse, Arthritis of left knee, Atrial fibrillation ([address] HTN (hypertension), Hyperlipidemia, and PAF (paroxysmal atrial fibrillation) ([address] has a past surgical history that includes Hip fracture surgery (Left).     Treatment Diagnosis: impaired fxl transfers/mobility, ADLs, strength, balance      Restrictions  Restrictions/Precautions  Restrictions/Precautions: Fall Risk  Position Activity Restriction  Other position/activity restrictions: right hip posterior precautions    Subjective   General  Chart Reviewed: Yes, Orders, Progress Notes  Additional Pertinent Hx: Per Crystal ELLIS's note  7/1/2020: \"Wes Ortega is a 80 y.o. male with PMH of Afib not on blood thinners, who presents to the emergency department for evaluation after fall from bed. The patient states that he was trying to get out of bed but his feet got caught in the sheets and he fell, landing on the right side of his face and right hip on the floor. He is not on blood thinners. He thinks that he lost consciousness for several seconds. He denies severe headache, retrograde amnesia, confusion, nausea or vomiting. He reports 10 out of 10 right hip pain that is aching and throbbing and constant. He is unable to move the right leg due to hip pain. No numbness or tingling. No neck or back pain. No other acute concerns, associated symptoms or modifying factors. \" Pt underwent RIGHT HIP HEMIARTHROPLASTY on 7/6/2020 s/p Right hip avascular necrosis #2 right hip femoral neck fracture, now with posterior hip precautions. PMH includes: HLD, HTN, left knee arthritis, PAF, afib, alcohol abuse, left hip fracture surgery. Family / Caregiver Present: No  Referring Practitioner: Sly Romo PA-C  Diagnosis: right hip fracture s/p fall, right hip hemiarthroplasty  Subjective  Subjective: Pt met bedside, returning from xray via stretcher. Pt agreeable to OT and PT co-eval. Pt denies pain. Social/Functional History  Social/Functional History  Type of Home: Facility([facility])  ADL Assistance: Needs assistance  Ambulation Assistance: (w/c for past 5 years- reports independent to propel)  Transfer Assistance: Needs assistance(can't remember if he used lift equipment or stand pivot)  Additional Comments: Per June PALAFOX's note 7/2/2020: \"Corey reviewed patient's chart. He is from [facility]. Rola Teague at [facility] confirmed he is from long term care but was ambulatory before.  She reported he will need a PT/OT eval for skilled needs at [place]. Patient will also need a [facility]. \"       Objective   Vision: Within Functional Limits  Hearing: Exceptions to WFL(hearing aids not present)  Hearing Exceptions: Hard of hearing/hearing concerns;Bilateral hearing aid      Orientation  Overall Orientation Status: Impaired  Orientation Level: Oriented to person;Disoriented to situation(knew he fell, but did not realize he had surgery)        Balance  Sitting Balance: (initially max assist EOB, progressed to CGA-SBA)  Standing Balance: Dependent/Total(via Salena Brett)    Functional Mobility  Functional Mobility Comments: pt has been w/c dependent for past 5 years but he cannot recall how he transferred to/from w/c. Use of Salena Brett this date. ADL  Feeding: Setup(OT prepared coffee for pt & he drank from cup without lid)  Additional Comments: Anticipate pt dependent LB bathing/dressing, min a UB bathing/dressing and grooming, setup feeding as demo'd by pt's PLOF, endurance, cognition, balance, and strength. Tone RUE  RUE Tone: Normotonic  Tone LUE  LUE Tone: Normotonic  Coordination  Movements Are Fluid And Coordinated: Yes           Transfers  Sit to stand: Maximum assistance;2 Person assistance  Stand to sit: 2 Person assistance;Maximum assistance  Transfer Comments: max x2 (Sit>stand from bed, stand>sit recliner) via Salena Brett. Also, dependent x4 stretcher transfer > bed sliding to bed. Cognition  Overall Cognitive Status: Exceptions  Arousal/Alertness: Appropriate responses to stimuli  Following Commands: (difficulty d/t [place])  Memory: Decreased recall of recent events;Decreased short term memory;Decreased long term memory;Decreased recall of precautions  Initiation: Requires cues for some  Sequencing: Requires cues for some  Cognition Comment: Pt is a poor historian.            Sensation  Overall Sensation Status: (not assessed)           LUE AROM (degrees)  LUE General AROM: lacking end range shoulder flexion  Left Hand AROM (degrees)  Left Hand AROM: WFL  RUE AROM (degrees)  RUE General AROM: lacking end range shoulder flexion  Right Hand AROM (degrees)  Right Hand AROM: WFL     LUE Strength  Gross LUE Strength: Exceptions to [facility]  L Hand General: 4-/5  LUE Strength Comment: grossly 4-/5 throughout  RUE Strength  Gross RUE Strength: Exceptions to [facility]  R Hand General: 4-/5  RUE Strength Comment: grossly 4-/5 throughout                   Plan   Plan  Times per week: 3-5x  Times per day: Daily  Current Treatment Recommendations: Strengthening, Wheelchair Mobility Training, Balance Training, Endurance Training, Functional Mobility Training, Safety Education & Training, Patient/Caregiver Education & Training, Equipment Evaluation, Education, & procurement, Self-Care / ADL    OutComes Score  How much help for putting on and taking off regular lower body clothing?: Total  How much help for Bathing?: A Lot  How much help for Toileting?: Total  How much help for putting on and taking off regular upper body clothing?: A Lot  How much help for taking care of personal grooming?: A Lot  How much help for eating meals?: A Little  AM-PAC Inpatient Daily Activity Raw Score: 11  AM-PAC Inpatient ADL T-Scale Score : 29.04  ADL Inpatient CMS 0-100% Score: 70.42  ADL Inpatient CMS G-Code Modifier : CL                                                  AM-PAC Score        AM-PAC Inpatient Daily Activity Raw Score: 11 (07/07/20 1113)  AM-PAC Inpatient ADL T-Scale Score : 29.04 (07/07/20 1113)  ADL Inpatient CMS 0-100% Score: 70.42 (07/07/20 1113)  ADL Inpatient CMS G-Code Modifier : CL (07/07/20 1113)    Goals  Short term goals  Time Frame for Short term goals: to be met by d/c  Short term goal 1: Pt will complete bed mobility with min a x1. Short term goal 2: Pt will complete fxl transfers via stand pivot mod a x2. Short term goal 3: Pt will tolerate sitting EOB x5-7 mins SBA to participate in ADLs.   Short term goal 4: Pt will tolerate BUE HEP x10-15 mins to increase endurance and strength for ADLs and fxl transfers/bed mobility.   Long term goals  Time Frame for Long term goals : same as STGs  Patient Goals   Patient goals : pt did not formulate a goal       Therapy Time   Individual Concurrent Group Co-treatment   Time In       1008   Time Out       1105   Minutes       57   Timed Code Treatment Minutes: 42 Minutes       Let this serve as discharge note if discharged prior to next OT session    August Scott OTR/L #[id number]

## 2020-07-07 NOTE — PROGRESS NOTES
Pt SPO2 in 80s . Pt increased to 15L high flow. Pt still unable to get SPO2 out of 80s. Respiratory consulted and  Non-re breather mask placed. Pt currently at 99% SPO2. RENO Evangelista notified and Charge Nurse Rad VILLATORO notifed. RENO Evangelista gave NO to D/C fluids. Will continue to monitor.

## 2020-07-07 NOTE — PROGRESS NOTES
Hospitalist Progress Note      PCP: Danyell Silvestre MD    Date of Admission: 7/1/2020    Chief Complaint: R Hip Pain    Hospital Course: The patient is a 80 y.o. male who presents to [facility] with right hip pain. Patient was at his nursing home when he tripped and fell. He states he was unable to stand so he was brought to the emergency department. In the emergency department he was found to have a right subcapital hip fracture. There were some bony lesions on his right hip as well. Orthopedic surgery was consulted and will plan for right hip surgery tomorrow along with intraoperative biopsy. Patient will be admitted to the hospital for further care and intervention. Patient eventually developed acute decompensated heart failure along with moderate bilateral pleural effusions and acute hypoxic respiratory failure. Pulmonology was consulted and a repeat COVID-19 test was ordered. Echocardiogram showed grade 3 diastolic dysfunction with a normal ejection fraction. 7/4 Patient appears to be doing better. We will continue to diurese the patient and hopefully have him tuned up for surgery on Monday. 7/5 Oxygen demand trending down with diuretics. Now on 3L. Feeling good. Hopefully surgical intervention tomorrow    7/6  Patient was on 10 L earlier this morning before he was brought back down to 3 L. Making good urine output with Lasix. Patient is stable for surgical intervention today. Subjective: Patient seen and examined. Patient's oxygen once again increased overnight and is back down this morning. Continues to diurese well. Creatinine bumping up so we will decrease to oral 40 mg daily. Cardiology and pulmonology following along.   Surgical intervention yesterday without issue  Medications:  Reviewed    Infusion Medications   Scheduled Medications    sodium chloride flush  10 mL Intravenous 2 times per day    enoxaparin  30 mg Subcutaneous Daily    azithromycin  250 mg Intravenous Q24H    cefTRIAXone (ROCEPHIN) IV  2 g Intravenous Q24H    ipratropium-albuterol  1 ampule Inhalation Q6H WA    furosemide  40 mg Intravenous BID    amiodarone  200 mg Oral Daily    amitriptyline  12.5 mg Oral Nightly    calcium-vitamin D  1 tablet Oral Daily with breakfast    divalproex  250 mg Oral Nightly    gabapentin  100 mg Oral BID    polyvinyl alcohol  1 drop Both Eyes BID    melatonin  4.5 mg Oral Nightly    metoprolol succinate  50 mg Oral Daily    tamsulosin  0.4 mg Oral Daily    sodium chloride flush  10 mL Intravenous 2 times per day     PRN Meds: methylnaltrexone, sodium chloride flush, HYDROmorphone **OR** HYDROmorphone, HYDROcodone 5 mg - acetaminophen, sodium chloride flush, acetaminophen **OR** acetaminophen, polyethylene glycol, promethazine **OR** ondansetron, potassium chloride **OR** potassium alternative oral replacement **OR** potassium chloride, magnesium sulfate      Intake/Output Summary (Last 24 hours) at 7/7/2020 0904  Last data filed at 7/7/2020 1836  Gross per 24 hour   Intake 1410 ml   Output 1200 ml   Net 210 ml       Physical Exam Performed:    BP (!) 127/55   Pulse 76   Temp 97.8 °F (36.6 °C) (Oral)   Resp 16   Ht 5' 8\" (1.727 m)   Wt 147 lb 0.8 oz (66.7 kg)   SpO2 99%   BMI 22.36 kg/m²     General appearance: No apparent distress, appears stated age and cooperative. HEENT: Pupils equal, round, and reactive to light. Conjunctivae/corneas clear. Neck: Supple, with full range of motion. No jugular venous distention. Trachea midline. Respiratory: Diminished breath sounds bilateral  Cardiovascular: Regular rate and rhythm with normal S1/S2   Abdomen: Soft, non-tender, non-distended with normal bowel sounds. Musculoskeletal: right hp pain  Skin: Skin color, texture, turgor normal.  No rashes or lesions. Neurologic:  Neurovascularly intact without any focal sensory/motor deficits.  Cranial nerves: II-XII intact, grossly non-focal.  Psychiatric: Alert Capillary Refill: Brisk,< 3 seconds   Peripheral Pulses: +2 palpable, equal bilaterally       Labs:   Recent Labs     07/06/20  0459 07/07/20  0622   WBC 12.7* 10.9   HGB 10.1* 8.8*   HCT 30.9* 26.9*    314     Recent Labs     07/05/20  1001 07/06/20  0459 07/07/20  0622    144 140   K 3.4* 3.0* 3.8    101 103   CO2 27 30 25   BUN 26* 27* 36*   CREATININE 1.4* 1.5* 1.7*   CALCIUM 8.6 8.5 8.3   PHOS 3.5 3.6 5.1*     No results for input(s): AST, ALT, BILIDIR, BILITOT, ALKPHOS in the last 72 hours. No results for input(s): INR in the last 72 hours. No results for input(s): Scarlet Hodgkins in the last 72 hours. Urinalysis:      Lab Results   Component Value Date    NITRU Negative 07/01/2020    WBCUA 1 07/01/2020    RBCUA 1 07/01/2020    BLOODU Negative 07/01/2020    SPECGRAV 1.013 07/01/2020    GLUCOSEU Negative 07/01/2020       Radiology:  XR CHEST PORTABLE   Final Result   Stable appearance of presumed pulmonary edema with bilateral pleural effusions         XR CHEST 1 VW   Final Result   Moderate to large right-sided and small to moderate left pleural effusion,   mildly increased over the past 24 hours. Increased pulmonary edema. Focal new area of left perihilar atelectasis versus pneumonia. Increased bibasilar atelectasis         CT CHEST WO CONTRAST   Final Result   Moderate bilateral pleural effusions and bilateral airspace disease with   superimposed atelectasis or pneumonia at the lung bases. Scattered areas of   septal thickening are seen, compatible with a component of fluid   overload-pulmonary edema         NM LUNG SCAN PERFUSION ONLY   Final Result   Intermediate probability for pulmonary embolism. XR LUMBAR SPINE (2-3 VIEWS)   Final Result   Limited portable exam demonstrating no definite acute fracture         XR CHEST 1 VW   Final Result   Pulmonary vascular congestion. Suspect right pleural effusion.   Right   perihilar ground-glass opacity may represent asymmetric edema or pneumonia         CT Head WO Contrast   Final Result   No acute intracranial abnormality. Diffuse atrophic changes with findings suggesting chronic microvascular   ischemia         CT CERVICAL SPINE WO CONTRAST   Final Result   No acute abnormality of the cervical spine.          XR HIP RIGHT (2-3 VIEWS)   Final Result   Subcapital right femoral neck fracture, may be pathologic as there are   possible destructive bony lesions within the right femoral neck                 Assessment/Plan:    Acute decompensated diastolic heart failure  Echocardiogram with ejection fraction 55% with grade 3 diastolic dysfunction  Continue IV Lasix, -3.1 L so far  CHF nurse consult  BNP markedly elevated, chest x-ray and CT scan with bilateral pleural effusions    Acute respiratory failure with hypoxia  Likely secondary to bilateral pleural effusions  Titrate oxygen to keep saturations above 90%  Consider thoracentesis if no improvement with diuretics  Trending down with lasix, now on 3L  Fluctuating    Acute right hip fracture status post fall  ORIF postop day 1  Patient's respiratory status needs to improve before surgical intervention  Pain management    ARIANE on CKD stage III  Continue IV diuresis, changed to oral Lasix   trending up  Monitor    Postop anemia   hemoglobin dropped from 10-8.8  Continue to monitor    History of paroxysmal atrial fibrillation    Recent COVID-19 infection  Resolved    DVT Prophylaxis: lovenox  Diet: DIET GENERAL; Daily Fluid Restriction: 2000 ml  Code Status: DNR-CCA    PT/OT Eval Status: Ordered      Dispo - Chayo Cornejo MD

## 2020-07-07 NOTE — PROGRESS NOTES
Py awake and pulling at mask. Pt changed to nasal cannula for comfort on 15 L high flow. Pt SPO2 at 99%.  Will continue to monitor,

## 2020-07-07 NOTE — PROGRESS NOTES
The patient is resting in bed. He is now postop day #1 status post right hip hemiarthroplasty. He denies any pain in the right hip. He appears neurovascularly intact in the right lower extremity. There is no evidence of DVT. Lab Results   Component Value Date    HGB 8.8 (L) 07/07/2020   Acute on chronic blood loss anemia - expected after surgery. Will monitor Hgb. The patient may return to [facility] when he is deemed stable from a pulmonary standpoint. The patient will need to follow posterior hip precautions. He will follow-up with me in approximately 1 month and we will reassess him then.

## 2020-07-07 NOTE — PROGRESS NOTES
Pulmonary Progress Note    Date of Admission: 7/1/2020   LOS: 6 days       CC:  Severe hypoxemia    Subjective:  Oxygen was increased overnight. Started to wean this morning. Sleeping currently  Status post OR yesterday    ROS:   Sleeping      PHYSICAL EXAM:   Blood pressure (!) 127/55, pulse 76, temperature 97.8 °F (36.6 °C), temperature source Oral, resp. rate 16, height 5' 8\" (1.727 m), weight 147 lb 0.8 oz (66.7 kg), SpO2 99 %.'  Gen: No distress. ENT:   Resp: No accessory muscle use. No crackles. No wheezes. No rhonchi. CV: Regular rate. Regular rhythm. No murmur or rub. No edema. Skin: Warm, dry, normal texture and turgor. No nodule on exposed extremities. M/S: No cyanosis. No clubbing. No joint deformity. Psych: Awake.       Medications:    Scheduled Meds:   sodium chloride flush  10 mL Intravenous 2 times per day    enoxaparin  30 mg Subcutaneous Daily    azithromycin  250 mg Intravenous Q24H    cefTRIAXone (ROCEPHIN) IV  2 g Intravenous Q24H    ipratropium-albuterol  1 ampule Inhalation Q6H WA    furosemide  40 mg Intravenous BID    amiodarone  200 mg Oral Daily    amitriptyline  12.5 mg Oral Nightly    calcium-vitamin D  1 tablet Oral Daily with breakfast    divalproex  250 mg Oral Nightly    gabapentin  100 mg Oral BID    polyvinyl alcohol  1 drop Both Eyes BID    melatonin  4.5 mg Oral Nightly    metoprolol succinate  50 mg Oral Daily    tamsulosin  0.4 mg Oral Daily    sodium chloride flush  10 mL Intravenous 2 times per day       Continuous Infusions:      PRN Meds:  methylnaltrexone, sodium chloride flush, HYDROmorphone **OR** HYDROmorphone, HYDROcodone 5 mg - acetaminophen, sodium chloride flush, acetaminophen **OR** acetaminophen, polyethylene glycol, promethazine **OR** ondansetron, potassium chloride **OR** potassium alternative oral replacement **OR** potassium chloride, magnesium sulfate    Labs reviewed:  CBC:   Recent Labs     07/06/20  0459 07/07/20  0622   WBC 12.7* 10.9   HGB 10.1* 8.8*   HCT 30.9* 26.9*   MCV 96.6 96.9    314     BMP:   Recent Labs     07/05/20  1001 07/06/20  0459 07/07/20  0622    144 140   K 3.4* 3.0* 3.8    101 103   CO2 27 30 25   PHOS 3.5 3.6 5.1*   BUN 26* 27* 36*   CREATININE 1.4* 1.5* 1.7*     LIVER PROFILE:   No results for input(s): AST, ALT, LIPASE, BILIDIR, BILITOT, ALKPHOS in the last 72 hours. Invalid input(s): AMYLASE,  ALB  PT/INR: No results for input(s): PROTIME, INR in the last 72 hours. APTT: No results for input(s): APTT in the last 72 hours. UA:  No results for input(s): NITRITE, COLORU, PHUR, LABCAST, WBCUA, RBCUA, MUCUS, TRICHOMONAS, YEAST, BACTERIA, CLARITYU, SPECGRAV, LEUKOCYTESUR, UROBILINOGEN, BILIRUBINUR, BLOODU, GLUCOSEU, AMORPHOUS in the last 72 hours. Invalid input(s): Sandi Roland  No results for input(s): PH, PCO2, PO2 in the last 72 hours. Cx:      Films:      Assessment:     Acute hypoxemic respiratory failure with saturations less than 90% on room air  History of COVID-19  Hip fracture  Acute kidney injury         Plan:      Abnormal radiograph with acute hypoxemic respiratory failure  -Echocardiogram with diastolic dysfunction   -Lasix, negative 3.1 L. Defer fluid status to cardiology  -Wean oxygen at bedside. Doing well on 4 L nasal cannula  -  ceftriaxone and azithromycin. X 7 and 5 days. -Mild hypoxemia after operating room as expected by the patient's CHF and pneumonia. Improving readily.  -Incentive spirometry    Acute kidney injury     -Mild increase in creatinine. Overall doing well. Hip fracture  -Postop day 1         This note was transcribed using [id number]. Please disregard any translational errors.       Matt Ortega [facility], [facility] [phone]

## 2020-07-07 NOTE — PROGRESS NOTES
Occupational Therapy  Attempt Note    Name: Blanca Moore  : 1932  MRN: [id number]  Room: 2700  Date: 2020    Orders received. [facility]. Will re-attempt eval as schedule permits.     Electronically signed by DIANE Montejo/L #[id number] on 20 at 10:14 AM EDT

## 2020-07-07 NOTE — PROGRESS NOTES
Occupational Therapy  David Brayden  [id number]  [id number]    Patient seated in recliner chair upon arrival to room with PT to assist with returning patient to bed. Sit to stand from recliner chair to [address] [place] [address] stedy with Mod/Max A of 2. Stand to sit to [facility] over commode and bed with Mod A of 2. Use of william stedy for safe mobility from recliner chair to bathroom to bed with assist of 2. Dependent to change briefs. Sit to supine with Max A of 2. If discharged prior to next OT session please see last daily note for discharge status.     Electronically signed by DANY [id number] on 7/7/2020 at 2:58 PM    Therapy Time     Individual Co-treatment   Time In 1300    Time Out 1323    Minutes 23

## 2020-07-07 NOTE — PROGRESS NOTES
Pt AAO x3 except to time per this. Pt C/o of pain to right hip. Pain managed with Prn Medication, Ice, repositioning. Effective but ongoing. Dressing to right leg CDI, Abductor pillow in place. VSS on high Flow O2 per  Nasal Cannula. Pt ate only about 20% of dinner stating, \"I'm not hungry. \" Pt calm and cooperative per this shift. Incentive Spirometer education completed with good participation. Fall precautions in place. Bed alarm on. Call light within reach. Will continue to monitor.

## 2020-07-07 NOTE — PROGRESS NOTES
Patient resting comfortably in bed. Tolerating PO. Patient denies pain at this time. Call light within reach. Will continue to monitor and reassess.  Electronically signed by Cici Wilde RN on 7/7/2020

## 2020-07-07 NOTE — CARE COORDINATION
Sw spoke with Roldan Aldridge at [facility] earlier today regarding possible dc of patient on 7/8/20 per care rounds. She will start [address]. Per Calvin Knock test from 7/1/20, is acceptable; another test is not needed.      Electronically signed by Antonia Maldonado on 7/7/2020 at 4:30 PM

## 2020-07-07 NOTE — PROGRESS NOTES
[address] [facility] Orthopedic Surgery   Progress Note      S/P :  SUBJECTIVE  Patient in bed, sleepy but easily awakens to verbal stimuli. Patient does not appear in distress, sleeping easily. OBJECTIVE              Physical                      VITALS:  BP (!) 127/55   Pulse 76   Temp 97.8 °F (36.6 °C) (Oral)   Resp 16   Ht 5' 8\" (1.727 m)   Wt 147 lb 0.8 oz (66.7 kg)   SpO2 99%   BMI 22.36 kg/m²                     MUSCULOSKELETAL:  right foot NVI. Wiggles toes to command. Pedal pulses are palpable. NEUROLOGIC:                                  Sensory:  Touch:  Right Lower Extremity:  normal                                                 Surgical wound appears clean and dry with prineo, light bruising to site noted.      Data       CBC:   Lab Results   Component Value Date    WBC 10.9 07/07/2020    RBC 2.78 07/07/2020    HGB 8.8 07/07/2020    HCT 26.9 07/07/2020    MCV 96.9 07/07/2020    MCH 31.6 07/07/2020    MCHC 32.7 07/07/2020    RDW 14.9 07/07/2020     07/07/2020    MPV 7.9 07/07/2020        WBC:    Lab Results   Component Value Date    WBC 10.9 07/07/2020        Hemoglobin/Hematocrit:    Lab Results   Component Value Date    HGB 8.8 07/07/2020    HCT 26.9 07/07/2020        PT/INR:    Lab Results   Component Value Date    PROTIME 13.1 06/17/2018    INR 1.15 06/17/2018            Current Inpatient Medications             Current Facility-Administered Medications: methylnaltrexone (RELISTOR) injection 12 mg, 12 mg, Subcutaneous, Q12H PRN  sodium chloride flush 0.9 % injection 10 mL, 10 mL, Intravenous, 2 times per day  sodium chloride flush 0.9 % injection 10 mL, 10 mL, Intravenous, PRN  HYDROmorphone (DILAUDID) injection 0.25 mg, 0.25 mg, Intravenous, Q3H PRN **OR** HYDROmorphone (DILAUDID) injection 0.5 mg, 0.5 mg, Intravenous, Q3H PRN  HYDROcodone-acetaminophen (NORCO) 5-325 MG per tablet 1 tablet, 1 tablet, Oral, Q4H PRN  enoxaparin (LOVENOX) injection 30 mg, 30 mg, Subcutaneous, Daily  azithromycin (ZITHROMAX) 250 mg in dextrose 5 % 250 mL IVPB, 250 mg, Intravenous, Q24H  cefTRIAXone (ROCEPHIN) 2 g IVPB in D5W 50ml minibag, 2 g, Intravenous, Q24H  ipratropium-albuterol (DUONEB) nebulizer solution 1 ampule, 1 ampule, Inhalation, Q6H WA  furosemide (LASIX) injection 40 mg, 40 mg, Intravenous, BID  amiodarone (CORDARONE) tablet 200 mg, 200 mg, Oral, Daily  amitriptyline (ELAVIL) tablet 12.5 mg, 12.5 mg, Oral, Nightly  calcium-vitamin D 500-200 MG-UNIT per tablet 1 tablet, 1 tablet, Oral, Daily with breakfast  divalproex (DEPAKOTE SPRINKLE) capsule 250 mg, 250 mg, Oral, Nightly  gabapentin (NEURONTIN) capsule 100 mg, 100 mg, Oral, BID  polyvinyl alcohol (LIQUIFILM TEARS) 1.4 % ophthalmic solution 1 drop, 1 drop, Both Eyes, BID  melatonin tablet 4.5 mg, 4.5 mg, Oral, Nightly  metoprolol succinate (TOPROL XL) extended release tablet 50 mg, 50 mg, Oral, Daily  tamsulosin (FLOMAX) capsule 0.4 mg, 0.4 mg, Oral, Daily  sodium chloride flush 0.9 % injection 10 mL, 10 mL, Intravenous, 2 times per day  sodium chloride flush 0.9 % injection 10 mL, 10 mL, Intravenous, PRN  acetaminophen (TYLENOL) tablet 650 mg, 650 mg, Oral, Q6H PRN **OR** acetaminophen (TYLENOL) suppository 650 mg, 650 mg, Rectal, Q6H PRN  polyethylene glycol (GLYCOLAX) packet 17 g, 17 g, Oral, Daily PRN  promethazine (PHENERGAN) tablet 12.5 mg, 12.5 mg, Oral, Q6H PRN **OR** ondansetron (ZOFRAN) injection 4 mg, 4 mg, Intravenous, Q6H PRN  potassium chloride (KLOR-CON M) extended release tablet 40 mEq, 40 mEq, Oral, PRN **OR** potassium bicarb-citric acid (EFFER-K) effervescent tablet 40 mEq, 40 mEq, Oral, PRN **OR** potassium chloride 10 mEq/100 mL IVPB (Peripheral Line), 10 mEq, Intravenous, PRN  magnesium sulfate 2 g in 50 mL IVPB premix, 2 g, Intravenous, PRN    ASSESSMENT AND PLAN      Post op Right Hip Hemiarthorplasty  DVT prophylaxis ordered, on lovenox inpatient, Aspirin 81 mg BID X30 days after discharge  PT OT for ADL's and ambulation as tolerated, posterior hip precautions  SS for DC planning, ECF  IV or PO pain med as ordered      Gurmeet Lakhani [facility]  7/7/2020  9:12 AM

## 2020-07-07 NOTE — PROGRESS NOTES
Hard of hearing/hearing concerns;Bilateral hearing aid     Subjective  General  Chart Reviewed: Yes  Additional Pertinent Hx: here due to fall at [facility] with right hip fracture  -PLOF from wheelchair as primary mobility  Response To Previous Treatment: Not applicable  Family / Caregiver Present: No  Follows Commands: Within Functional Limits  Subjective  Subjective: alert oriented to self and loosely the general situation- states little pain at the moment  Pain Screening  Patient Currently in Pain: Yes  Social/Functional History  Social/Functional History  Type of Home: Facility([facility])  ADL Assistance: Needs assistance  Ambulation Assistance: (w/c for past 5 years- reports independent to propel)  Transfer Assistance: Needs assistance(can't remember if he used lift equipment or stand pivot)  Additional Comments: Per Joyce PALAFOX's note 7/2/2020: \"Corey reviewed patient's chart. He is from [facility]. Reema Dougherty at [facility] confirmed he is from long term care but was ambulatory before. She reported he will need a PT/OT eval for skilled needs at [facility]. [facility].  \"  Objective  ROM and strength non-surgical limbs appear grossly functional (not tested formally)  -appears right knee extension limited  (primarily uses wheelchair)  Motor Control  Gross Motor?: WFL  Sensation  Overall Sensation Status: (not assessed)  Bed mobility  Supine to Sit: Maximum assistance;2 Person assistance  Sit to Supine: Unable to assess  Transfers  Sit to Stand: Maximum Assistance;2 Person Assistance(stedy)  Stand to sit: Maximum Assistance;2 Person Assistance  Ambulation  Ambulation?: No  Stairs/Curb  Stairs?: No  Balance  Comments: midline in sitting at the EOB max to min/mod  Plan   Plan  Times per week: qd x1-2 then 3-5 while on acute floor  Current Treatment Recommendations: Functional Mobility Training, Transfer Training, Modalities, Positioning, Patient/Caregiver Education & Training, ADL/Self-care Training  Safety Devices  Type of devices:  All fall risk precautions in place, Call light within reach, Chair alarm in place, Left in chair, Nurse notified(Leeanna)    AM-PAC Score  AM-PAC Inpatient Mobility Raw Score : 8 (07/07/20 1058)  AM-PAC Inpatient T-Scale Score : 28.52 (07/07/20 1058)  Mobility Inpatient CMS 0-100% Score: 86.62 (07/07/20 1058)  Mobility Inpatient CMS G-Code Modifier : CM (07/07/20 1058)          Goals  Short term goals  Time Frame for Short term goals: 1-2 days  Short term goal 1: bed mobility at mod assist of 2  Short term goal 2: transfers at mod assist of 2 in stedy   Short term goal 3: trial transfers up to walker     Patient Goals   Patient goals : none formulated at this session       Therapy Time   Individual Concurrent Group Co-treatment   Time In       1008   Time Out       1105   Minutes       62           501 Rina Andersen, PT     Second session along with OT and nursing to assist from recliner to stedy >>bathroom >>bed  -max assist of 2 for sit <> stand  -mod/max assist of 1 for static stance in the stedy (nursing and OT assisting with kyaw-care and placement of sacral heart pad and Depends)  -max assist of 2 for sit to supine and to reposition up in bed  -abduction pillow placed   -recommend dc abduction pillow tomorrow so able to better float the heels from bed - can use thick blanket rolled between knees for abduction  -he has modest knee flexion contractures which increase heel pressure to mattress  -at conclusion, nursing remains with patient - bed alarm on  Electronically signed by Aaron Andersen, PT on 7/7/2020 at 1:35 PM

## 2020-07-07 NOTE — PLAN OF CARE
Problem: Skin Integrity:  Goal: Will show no infection signs and symptoms  Description: Will show no infection signs and symptoms  Outcome: Ongoing  Note: Pt assessed for infection, No signs or symptoms of surgical site noted. VVS, WBC being monitored. Reviewed information with pt and family, pt verbalized understanding        Problem: Falls - Risk of:  Goal: Will remain free from falls  Description: Will remain free from falls  Outcome: Ongoing  Note: Viet score assessed. Patient able to ambulate and turn self. Repositioned patient Q2H and assessed skin. Educated patient on importance of repositioning to prevent skin issues. Problem: Pain:  Goal: Pain level will decrease  Description: Pain level will decrease  Outcome: Ongoing  Note: Pain /discomfort being managed with PRN analgesics per MD orders, repositioning, ice. Effective but ongoing. Patient able to express presence and absence of pain and rate pain appropriately using numerical scale. Problem: OXYGENATION/RESPIRATORY FUNCTION  Goal: Patient will maintain patent airway  Outcome: Ongoing  Note: Patient remains free of significant airway secretions. Patient able to effectively cough and clear any secretions that need cleared. Will continue to monitor patient throughout shift. Goal: Patient will achieve/maintain normal respiratory rate/effort  Description: Respiratory rate and effort will be within normal limits for the patient  Outcome: Ongoing  Note: Respiratory rate falls within specified parameters. Will continue to monitor patient throughout shift. Problem: HEMODYNAMIC STATUS  Goal: Patient has stable vital signs and fluid balance  Outcome: Ongoing  Note: Patient has stable vital signs and fluid balance at this time. Will continue to monitor patient throughout shift and provide care for unstable vital signs or poor fluid balance as needed.         Problem: FLUID AND ELECTROLYTE IMBALANCE  Goal: Fluid and electrolyte balance are achieved/maintained  Outcome: Ongoing  Note: Patient monitored for fluid and electrolyte imbalance throughout the day by use of daily labs. Patient encouraged to drink fluids and eat a well balanced . Abnormal lab results will be reported to physician, per orders. Will continue to monitor. Problem: ACTIVITY INTOLERANCE/IMPAIRED MOBILITY  Goal: Mobility/activity is maintained at optimum level for patient  Outcome: Not Met This Shift  Note: Early and frequent ambulqtion encouraged. Educated patient on importance of early ambulation. Patient assisted with ambulation. Will continue to monitor.

## 2020-07-08 LAB
ALBUMIN SERPL-MCNC: 2.5 G/DL (ref 3.4–5)
ANION GAP SERPL CALCULATED.3IONS-SCNC: 12 MMOL/L (ref 3–16)
BUN BLDV-MCNC: 38 MG/DL (ref 7–20)
CALCIUM SERPL-MCNC: 8.4 MG/DL (ref 8.3–10.6)
CHLORIDE BLD-SCNC: 102 MMOL/L (ref 99–110)
CO2: 27 MMOL/L (ref 21–32)
CREAT SERPL-MCNC: 2 MG/DL (ref 0.8–1.3)
GFR AFRICAN AMERICAN: 38
GFR NON-AFRICAN AMERICAN: 32
GLUCOSE BLD-MCNC: 118 MG/DL (ref 70–99)
PHOSPHORUS: 4.2 MG/DL (ref 2.5–4.9)
POTASSIUM SERPL-SCNC: 3.9 MMOL/L (ref 3.5–5.1)
SODIUM BLD-SCNC: 141 MMOL/L (ref 136–145)

## 2020-07-08 PROCEDURE — 94640 AIRWAY INHALATION TREATMENT: CPT

## 2020-07-08 PROCEDURE — 6360000002 HC RX W HCPCS: Performed by: PHYSICIAN ASSISTANT

## 2020-07-08 PROCEDURE — 80069 RENAL FUNCTION PANEL: CPT

## 2020-07-08 PROCEDURE — 94761 N-INVAS EAR/PLS OXIMETRY MLT: CPT

## 2020-07-08 PROCEDURE — 2580000003 HC RX 258: Performed by: PHYSICIAN ASSISTANT

## 2020-07-08 PROCEDURE — 99233 SBSQ HOSP IP/OBS HIGH 50: CPT | Performed by: INTERNAL MEDICINE

## 2020-07-08 PROCEDURE — 6370000000 HC RX 637 (ALT 250 FOR IP): Performed by: PHYSICIAN ASSISTANT

## 2020-07-08 PROCEDURE — 2700000000 HC OXYGEN THERAPY PER DAY

## 2020-07-08 PROCEDURE — 97530 THERAPEUTIC ACTIVITIES: CPT

## 2020-07-08 PROCEDURE — 2060000000 HC ICU INTERMEDIATE R&B

## 2020-07-08 PROCEDURE — 36415 COLL VENOUS BLD VENIPUNCTURE: CPT

## 2020-07-08 RX ADMIN — GABAPENTIN 100 MG: 100 CAPSULE ORAL at 09:17

## 2020-07-08 RX ADMIN — SODIUM CHLORIDE, PRESERVATIVE FREE 10 ML: 5 INJECTION INTRAVENOUS at 09:18

## 2020-07-08 RX ADMIN — SODIUM CHLORIDE, PRESERVATIVE FREE 10 ML: 5 INJECTION INTRAVENOUS at 21:35

## 2020-07-08 RX ADMIN — METHYLNALTREXONE BROMIDE 12 MG: 12 INJECTION, SOLUTION SUBCUTANEOUS at 06:26

## 2020-07-08 RX ADMIN — IPRATROPIUM BROMIDE AND ALBUTEROL SULFATE 1 AMPULE: .5; 3 SOLUTION RESPIRATORY (INHALATION) at 20:14

## 2020-07-08 RX ADMIN — POLYETHYLENE GLYCOL 3350 17 G: 17 POWDER, FOR SOLUTION ORAL at 09:17

## 2020-07-08 RX ADMIN — DIVALPROEX SODIUM 250 MG: 125 CAPSULE, COATED PELLETS ORAL at 21:36

## 2020-07-08 RX ADMIN — IPRATROPIUM BROMIDE AND ALBUTEROL SULFATE 1 AMPULE: .5; 3 SOLUTION RESPIRATORY (INHALATION) at 07:59

## 2020-07-08 RX ADMIN — POLYVINYL ALCOHOL 1 DROP: 14 SOLUTION/ DROPS OPHTHALMIC at 09:18

## 2020-07-08 RX ADMIN — GABAPENTIN 100 MG: 100 CAPSULE ORAL at 21:36

## 2020-07-08 RX ADMIN — METOPROLOL SUCCINATE 50 MG: 50 TABLET, EXTENDED RELEASE ORAL at 09:17

## 2020-07-08 RX ADMIN — TAMSULOSIN HYDROCHLORIDE 0.4 MG: 0.4 CAPSULE ORAL at 09:17

## 2020-07-08 RX ADMIN — CEFTRIAXONE 2 G: 2 INJECTION, POWDER, FOR SOLUTION INTRAMUSCULAR; INTRAVENOUS at 14:34

## 2020-07-08 RX ADMIN — AMIODARONE HYDROCHLORIDE 200 MG: 200 TABLET ORAL at 09:17

## 2020-07-08 RX ADMIN — OYSTER SHELL CALCIUM WITH VITAMIN D 1 TABLET: 500; 200 TABLET, FILM COATED ORAL at 09:17

## 2020-07-08 RX ADMIN — Medication 4.5 MG: at 21:35

## 2020-07-08 RX ADMIN — ENOXAPARIN SODIUM 30 MG: 30 INJECTION SUBCUTANEOUS at 09:17

## 2020-07-08 RX ADMIN — AMITRIPTYLINE HYDROCHLORIDE 12.5 MG: 25 TABLET, FILM COATED ORAL at 21:35

## 2020-07-08 RX ADMIN — IPRATROPIUM BROMIDE AND ALBUTEROL SULFATE 1 AMPULE: .5; 3 SOLUTION RESPIRATORY (INHALATION) at 13:41

## 2020-07-08 ASSESSMENT — PAIN SCALES - GENERAL
PAINLEVEL_OUTOF10: 0
PAINLEVEL_OUTOF10: 0

## 2020-07-08 NOTE — PROGRESS NOTES
[facility] Orthopedic Surgery   Progress Note      S/P :  SUBJECTIVE  Awake , alert and oriented. Pain is   described in right hip and with the intensity of mild at rest and moderate with movement of right leg Pain is described as aching. OBJECTIVE              Physical                      VITALS:  BP (!) 147/69   Pulse 74   Temp 98.3 °F (36.8 °C) (Oral)   Resp 18   Ht 5' 8\" (1.727 m)   Wt 145 lb 4.5 oz (65.9 kg)   SpO2 97%   BMI 22.09 kg/m²                     MUSCULOSKELETAL:  right foot NVI. Wiggles toes to command. Pedal pulses are palpable. NEUROLOGIC:                                  Sensory:  Touch:  Right Lower Extremity:  normal                                                 Surgical wound appears clean and dry right hip with Prineo dressing.      Data       CBC:   Lab Results   Component Value Date    WBC 10.9 07/07/2020    RBC 2.78 07/07/2020    HGB 8.8 07/07/2020    HCT 26.9 07/07/2020    MCV 96.9 07/07/2020    MCH 31.6 07/07/2020    MCHC 32.7 07/07/2020    RDW 14.9 07/07/2020     07/07/2020    MPV 7.9 07/07/2020        WBC:    Lab Results   Component Value Date    WBC 10.9 07/07/2020        Hemoglobin/Hematocrit:    Lab Results   Component Value Date    HGB 8.8 07/07/2020    HCT 26.9 07/07/2020        PT/INR:    Lab Results   Component Value Date    PROTIME 13.1 06/17/2018    INR 1.15 06/17/2018              Current Inpatient Medications             Current Facility-Administered Medications: [Held by provider] furosemide (LASIX) tablet 40 mg, 40 mg, Oral, Daily  methylnaltrexone (RELISTOR) injection 12 mg, 12 mg, Subcutaneous, Q12H PRN  sodium chloride flush 0.9 % injection 10 mL, 10 mL, Intravenous, 2 times per day  sodium chloride flush 0.9 % injection 10 mL, 10 mL, Intravenous, PRN  HYDROmorphone (DILAUDID) injection 0.25 mg, 0.25 mg, Intravenous, Q3H PRN **OR** HYDROmorphone (DILAUDID) injection 0.5 mg, 0.5 mg, Intravenous, Q3H PRN  HYDROcodone-acetaminophen (Veronda Ulices) 5-325 MG per tablet 1 tablet, 1 tablet, Oral, Q4H PRN  enoxaparin (LOVENOX) injection 30 mg, 30 mg, Subcutaneous, Daily  cefTRIAXone (ROCEPHIN) 2 g IVPB in D5W 50ml minibag, 2 g, Intravenous, Q24H  ipratropium-albuterol (DUONEB) nebulizer solution 1 ampule, 1 ampule, Inhalation, Q6H WA  amiodarone (CORDARONE) tablet 200 mg, 200 mg, Oral, Daily  amitriptyline (ELAVIL) tablet 12.5 mg, 12.5 mg, Oral, Nightly  calcium-vitamin D 500-200 MG-UNIT per tablet 1 tablet, 1 tablet, Oral, Daily with breakfast  divalproex (DEPAKOTE SPRINKLE) capsule 250 mg, 250 mg, Oral, Nightly  gabapentin (NEURONTIN) capsule 100 mg, 100 mg, Oral, BID  polyvinyl alcohol (LIQUIFILM TEARS) 1.4 % ophthalmic solution 1 drop, 1 drop, Both Eyes, BID  melatonin tablet 4.5 mg, 4.5 mg, Oral, Nightly  metoprolol succinate (TOPROL XL) extended release tablet 50 mg, 50 mg, Oral, Daily  tamsulosin (FLOMAX) capsule 0.4 mg, 0.4 mg, Oral, Daily  sodium chloride flush 0.9 % injection 10 mL, 10 mL, Intravenous, 2 times per day  sodium chloride flush 0.9 % injection 10 mL, 10 mL, Intravenous, PRN  acetaminophen (TYLENOL) tablet 650 mg, 650 mg, Oral, Q6H PRN **OR** acetaminophen (TYLENOL) suppository 650 mg, 650 mg, Rectal, Q6H PRN  polyethylene glycol (GLYCOLAX) packet 17 g, 17 g, Oral, Daily PRN  promethazine (PHENERGAN) tablet 12.5 mg, 12.5 mg, Oral, Q6H PRN **OR** ondansetron (ZOFRAN) injection 4 mg, 4 mg, Intravenous, Q6H PRN  potassium chloride (KLOR-CON M) extended release tablet 40 mEq, 40 mEq, Oral, PRN **OR** potassium bicarb-citric acid (EFFER-K) effervescent tablet 40 mEq, 40 mEq, Oral, PRN **OR** potassium chloride 10 mEq/100 mL IVPB (Peripheral Line), 10 mEq, Intravenous, PRN  magnesium sulfate 2 g in 50 mL IVPB premix, 2 g, Intravenous, PRN    ASSESSMENT AND PLAN    Fall  Right hip fx, post hemiarthroplasty, stable exam  DVT prophylaxis ordered, on lovenox inpatient, Aspirin 81 mg BID X30 days after discharge  PT OT for ADL's and ambulation as tolerated, posterior hip precautions  SS for DC planning, ECF  IV or PO pain med as ordered           Aliya [facility]  7/8/2020  12:15 PM

## 2020-07-08 NOTE — PROGRESS NOTES
Hospitalist Progress Note      PCP: Zuri Marsh MD    Date of Admission: 7/1/2020    Chief Complaint: R Hip Pain    Hospital Course: The patient is a 80 y.o. male who presents to [facility] with right hip pain. Patient was at his nursing home when he tripped and fell. He states he was unable to stand so he was brought to the emergency department. In the emergency department he was found to have a right subcapital hip fracture. There were some bony lesions on his right hip as well. Orthopedic surgery was consulted and will plan for right hip surgery tomorrow along with intraoperative biopsy. Patient will be admitted to the hospital for further care and intervention. Patient eventually developed acute decompensated heart failure along with moderate bilateral pleural effusions and acute hypoxic respiratory failure. Pulmonology was consulted and a repeat COVID-19 test was ordered. Echocardiogram showed grade 3 diastolic dysfunction with a normal ejection fraction. 7/4 Patient appears to be doing better. We will continue to diurese the patient and hopefully have him tuned up for surgery on Monday. 7/5 Oxygen demand trending down with diuretics. Now on 3L. Feeling good. Hopefully surgical intervention tomorrow    7/6  Patient was on 10 L earlier this morning before he was brought back down to 3 L. Making good urine output with Lasix. Patient is stable for surgical intervention today. 7/7   Patient's oxygen once again increased overnight and is back down this morning. Continues to diurese well. Creatinine bumping up so we will decrease to oral 40 mg daily. Cardiology and pulmonology following along. Surgical intervention yesterday without issue    Subjective: Patient seen and examined. No issues, breathing better.  Holding lasix    Medications:  Reviewed    Infusion Medications   Scheduled Medications    [Held by provider] furosemide  40 mg Oral Daily    sodium chloride flush 10 mL Intravenous 2 times per day    enoxaparin  30 mg Subcutaneous Daily    cefTRIAXone (ROCEPHIN) IV  2 g Intravenous Q24H    ipratropium-albuterol  1 ampule Inhalation Q6H WA    amiodarone  200 mg Oral Daily    amitriptyline  12.5 mg Oral Nightly    calcium-vitamin D  1 tablet Oral Daily with breakfast    divalproex  250 mg Oral Nightly    gabapentin  100 mg Oral BID    polyvinyl alcohol  1 drop Both Eyes BID    melatonin  4.5 mg Oral Nightly    metoprolol succinate  50 mg Oral Daily    tamsulosin  0.4 mg Oral Daily    sodium chloride flush  10 mL Intravenous 2 times per day     PRN Meds: methylnaltrexone, sodium chloride flush, HYDROmorphone **OR** HYDROmorphone, HYDROcodone 5 mg - acetaminophen, sodium chloride flush, acetaminophen **OR** acetaminophen, polyethylene glycol, promethazine **OR** ondansetron, potassium chloride **OR** potassium alternative oral replacement **OR** potassium chloride, magnesium sulfate    No intake or output data in the 24 hours ending 07/08/20 0842    Physical Exam Performed:    BP (!) 115/58   Pulse 68   Temp 97.9 °F (36.6 °C) (Oral)   Resp 16   Ht 5' 8\" (1.727 m)   Wt 145 lb 4.5 oz (65.9 kg)   SpO2 95%   BMI 22.09 kg/m²     General appearance: No apparent distress, appears stated age and cooperative. HEENT: Pupils equal, round, and reactive to light. Conjunctivae/corneas clear. Neck: Supple, with full range of motion. No jugular venous distention. Trachea midline. Respiratory: Diminished breath sounds bilateral  Cardiovascular: Regular rate and rhythm with normal S1/S2   Abdomen: Soft, non-tender, non-distended with normal bowel sounds. Musculoskeletal: right hp pain  Skin: Skin color, texture, turgor normal.  No rashes or lesions. Neurologic:  Neurovascularly intact without any focal sensory/motor deficits.  Cranial nerves: II-XII intact, grossly non-focal.  Psychiatric: Alert   Capillary Refill: Brisk,< 3 seconds   Peripheral Pulses: +2 palpable, equal bilaterally       Labs:   Recent Labs     07/06/20  0459 07/07/20  0622   WBC 12.7* 10.9   HGB 10.1* 8.8*   HCT 30.9* 26.9*    314     Recent Labs     07/06/20  0459 07/07/20  0622 07/08/20  0604    140 141   K 3.0* 3.8 3.9    103 102   CO2 30 25 27   BUN 27* 36* 38*   CREATININE 1.5* 1.7* 2.0*   CALCIUM 8.5 8.3 8.4   PHOS 3.6 5.1* 4.2     No results for input(s): AST, ALT, BILIDIR, BILITOT, ALKPHOS in the last 72 hours. No results for input(s): INR in the last 72 hours. No results for input(s): Domonique Formosa in the last 72 hours. Urinalysis:      Lab Results   Component Value Date    NITRU Negative 07/01/2020    WBCUA 1 07/01/2020    RBCUA 1 07/01/2020    BLOODU Negative 07/01/2020    SPECGRAV 1.013 07/01/2020    GLUCOSEU Negative 07/01/2020       Radiology:  XR HIP 2-3 VW W PELVIS RIGHT   Final Result   1. Status post right hip arthroplasty   2. Large groin hernia containing bowel         XR CHEST PORTABLE   Final Result   Stable appearance of presumed pulmonary edema with bilateral pleural effusions         XR CHEST 1 VW   Final Result   Moderate to large right-sided and small to moderate left pleural effusion,   mildly increased over the past 24 hours. Increased pulmonary edema. Focal new area of left perihilar atelectasis versus pneumonia. Increased bibasilar atelectasis         CT CHEST WO CONTRAST   Final Result   Moderate bilateral pleural effusions and bilateral airspace disease with   superimposed atelectasis or pneumonia at the lung bases. Scattered areas of   septal thickening are seen, compatible with a component of fluid   overload-pulmonary edema         NM LUNG SCAN PERFUSION ONLY   Final Result   Intermediate probability for pulmonary embolism. XR LUMBAR SPINE (2-3 VIEWS)   Final Result   Limited portable exam demonstrating no definite acute fracture         XR CHEST 1 VW   Final Result   Pulmonary vascular congestion.   Suspect right pleural effusion. Right   perihilar ground-glass opacity may represent asymmetric edema or pneumonia         CT Head WO Contrast   Final Result   No acute intracranial abnormality. Diffuse atrophic changes with findings suggesting chronic microvascular   ischemia         CT CERVICAL SPINE WO CONTRAST   Final Result   No acute abnormality of the cervical spine.          XR HIP RIGHT (2-3 VIEWS)   Final Result   Subcapital right femoral neck fracture, may be pathologic as there are   possible destructive bony lesions within the right femoral neck                 Assessment/Plan:    Acute decompensated diastolic heart failure  Echocardiogram with ejection fraction 55% with grade 3 diastolic dysfunction  -6.8S out, hold lasix with ariane  CHF nurse consult  BNP markedly elevated, chest x-ray and CT scan with bilateral pleural effusions    Acute respiratory failure with hypoxia  Likely secondary to bilateral pleural effusions  Titrate oxygen to keep saturations above 90%  Consider thoracentesis if no improvement with diuretics  Trending down with lasix, now on 3L    Acute right hip fracture status post fall  ORIF postop day 2  Pain management    ARIANE on CKD stage III  Continue IV diuresis, changed to oral Lasix, hold 7/8   trending up  Monitor    Postop anemia   hemoglobin dropped from 10-8.8  Continue to monitor  Recheck in am    History of paroxysmal atrial fibrillation    Recent COVID-19 infection  Resolved    DVT Prophylaxis: lovenox  Diet: DIET GENERAL; Daily Fluid Restriction: 2000 ml  Code Status: DNR-CCA    PT/OT Eval Status: Ordered      Dispo - Leslie Edwards MD

## 2020-07-08 NOTE — PROGRESS NOTES
Pt up to chair with therapy this evening, tolerated fairly well, x2 assist with the stedy. Pt has denied pain throughout the entire day. Pt SpO2 97% on 2.5 LPM nasal cannula. Returned to bed with PCA assist & stedy. Pt call light within reach, bed alarm on, states no needs or concerns at this time. Will monitor and reassess.  Electronically signed by Ashok Lopez RN on 7/8/2020 at 6:27 PM

## 2020-07-08 NOTE — PROGRESS NOTES
bed (abduction pillow in place) - alert and agreeable to PT OT session and to mobilize from bed to recliner  Pain Screening  Patient Currently in Pain: (no pain at rest - moderate pain with initial supine to sit)  Social/Functional History  Social/Functional History  Type of Home: Facility([facility])  ADL Assistance: Needs assistance  Ambulation Assistance: (w/c for past 5 years- reports independent to propel)  Transfer Assistance: Needs assistance(can't remember if he used lift equipment or stand pivot)  Additional Comments: Per Manda PALAFOX's note 7/2/2020: \"Corey reviewed patient's chart. He is from [facility]. Verena Sunshine at [facility] confirmed he is from long term care but was ambulatory before. She reported he will need a PT/OT eval for skilled needs at [place]. Patient will also need a [facility] precert. \"  Objective  Bed mobility  Supine to Sit: Maximum assistance;2 Person assistance  Sit to Supine: Unable to assess  Transfers  Sit to Stand: Moderate Assistance;2 Person Assistance(stedy -from elevated bed height)  Stand to sit: Minimal Assistance;2 Person Assistance  Ambulation  Ambulation?: No  Stairs/Curb  Stairs?: No  Balance  Comments: midline in sitting at the EOB with mod assist    - near midline in static stance but in moderately flexed posture    Plan   Plan  Times per week: qd x1-2 then 3-5 while on acute floor  Current Treatment Recommendations: Functional Mobility Training, Transfer Training, Modalities, Positioning, Patient/Caregiver Education & Training, ADL/Self-care Training  Safety Devices  Type of devices:  All fall risk precautions in place, Call light within reach, Chair alarm in place, Left in chair, Nurse notified(Jenny)    AM-PAC Score  AM-PAC Inpatient Mobility Raw Score : 9 (07/08/20 1443)  AM-PAC Inpatient T-Scale Score : 30.55 (07/08/20 1443)  Mobility Inpatient CMS 0-100% Score: 81.38 (07/08/20 1443)  Mobility Inpatient CMS G-Code Modifier : CM (07/08/20 1443)    Goals  Short term goals  Time Frame for Short term goals: 1-2 days  Short term goal 1: bed mobility at mod assist of 2  Short term goal 2: transfers at mod assist of 2 in stedy   Short term goal 3: trial transfers up to walker     Patient Goals   Patient goals : none formulated at this session       Therapy Time   Individual Concurrent Group Co-treatment   Time In       1405   Time Out       1435   Minutes       [facility],

## 2020-07-08 NOTE — PLAN OF CARE
Problem: Skin Integrity:  Goal: Absence of new skin breakdown  Description: Absence of new skin breakdown  Outcome: Ongoing  Note: Skin assessment performed each shift per protocol. Patient turned and repositioned every two hours and prn with pillow support. Patient checked for incontence every two hours. Preventative sacral mepilex intact to sacrum. Problem: Falls - Risk of:  Goal: Will remain free from falls  Description: Will remain free from falls  Outcome: Ongoing  Note: Patient free from falls this shift. Fall precautions in place at all times. Bed in lowest position with two side rails up and wheels locked. Call light within reach. Patient able and agreeable to contact for safety appropriately. Problem: Pain:  Description: Pain management should include both nonpharmacologic and pharmacologic interventions. Goal: Pain level will decrease  Description: Pain level will decrease  Outcome: Ongoing  Note: Patient denied pain or discomfort. Sleep at long intervals, but woke easily. Encouraged to inform staff of any changes in condition. Problem: OXYGENATION/RESPIRATORY FUNCTION  Goal: Patient will maintain patent airway  Outcome: Ongoing  Note: VSS. Peripheral pulse palpable. Skin pink, dry and warm. I&O monitor. Daily weight. Medication given as ordered. Education provided. Will continue to monitor.

## 2020-07-08 NOTE — PROGRESS NOTES
Skin assessment performed per protocol. Patient turned and repositioned every two hours and prn with pillow support. Patient checked for incontence every two hours. Preventative sacral mepilex intact to sacrum. Patient denied pain or discomfort. Sleep at long intervals, but woke easily. Encouraged to inform staff of any changes in condition. Fall precautions in place. Call light and personal belongings are within reach.

## 2020-07-08 NOTE — PROGRESS NOTES
Pt rounded on this morning Q2h, whiteboard updated, pt given ice water and needs assessed. Pt sitting up in bed, taking medications whole with water, glycolax mixed in apple juice at bedside, pt has not had a documented BM since 6/30. Pt also receiving Q12 PRN relistor. Pt denies pain,  has no needs or concerns at this time. Call light within reach, pt verbalized understanding to call prior to ambulating. Will continue to monitor and reassess.    Electronically signed by Karolina Inman RN on 7/8/2020 at 10:09 AM

## 2020-07-08 NOTE — PLAN OF CARE
Nutrition Problem: Increased nutrient needs  Intervention: Food and/or Nutrient Delivery: Start ONS, Modify current diet(add a no salt packet to diet orders)  Nutritional Goals: consume >/= to 50 %

## 2020-07-08 NOTE — PROGRESS NOTES
Occupational Therapy  Facility/Department: [facility] 3W ORTHOPEDICS  Daily Treatment Note  NAME: Violet Shepherd  : 1932  MRN: [id number]    Date of Service: 2020    Discharge Recommendations:  3-5 sessions per week       Assessment     Discussed with OTR am pac score of 11. Patient would benefit from skilled OT to decrease caregiver burden. Patient completed functional mobility using the william steady. Mod A with 2 people with bed mobility. Continued plan of care          Patient Diagnosis(es): The primary encounter diagnosis was Closed fracture of neck of right femur, initial encounter ([facility] [address] Diagnoses of Fall from bed, initial encounter, Injury of head, initial encounter, and Facial laceration, initial encounter were also pertinent to this visit. has a past medical history of Alcohol abuse, Arthritis of left knee, Atrial fibrillation ([address] HTN (hypertension), Hyperlipidemia, and PAF (paroxysmal atrial fibrillation) ([facility] [address] has a past surgical history that includes Hip fracture surgery (Left). Restrictions  Restrictions/Precautions  Restrictions/Precautions: Fall Risk  Position Activity Restriction  Other position/activity restrictions: right hip posterior precautions  Subjective   General  Chart Reviewed: Yes  Additional Pertinent Hx: Per Cara ELLIS's note  2020: \"Wes Moreau is a 80 y.o. male with PMH of Afib not on blood thinners, who presents to the emergency department for evaluation after fall from bed. The patient states that he was trying to get out of bed but his feet got caught in the sheets and he fell, landing on the right side of his face and right hip on the floor. He is not on blood thinners. He thinks that he lost consciousness for several seconds. He denies severe headache, retrograde amnesia, confusion, nausea or vomiting. He reports 10 out of 10 right hip pain that is aching and throbbing and constant. He is unable to move the right leg due to hip pain.   No numbness or tingling. No neck or back pain. No other acute concerns, associated symptoms or modifying factors. \" Pt underwent RIGHT HIP HEMIARTHROPLASTY on 7/6/2020 s/p Right hip avascular necrosis #2 right hip femoral neck fracture, now with posterior hip precautions. PMH includes: HLD, HTN, left knee arthritis, PAF, afib, alcohol abuse, left hip fracture surgery. Family / Caregiver Present: No  Referring Practitioner: Misael Staples PA-C  Diagnosis: right hip fracture s/p fall, right hip hemiarthroplasty  Subjective  Subjective: Patient supine in bed. Patient agreeable to OT cotreatment with PT. Orientation  Orientation  Overall Orientation Status: Impaired  Orientation Level: Disoriented to situation  Objective             Balance  Sitting Balance: Moderate assistance  Standing Balance: Dependent/Total  Standing Balance  Activity: Patient transfers to recliner chair using william steady  Functional Mobility  Functional - Mobility Device: Other(William steady)  Activity: Other(From bed to recliner chair)  Assist Level: Dependent/Total(used the william steady)  Functional Mobility Comments: Functional mobility using the william steady from bed to recliner chair  Bed mobility  Supine to Sit: Maximum assistance;2 Person assistance(Head of bed elevated and side rails utilized)  Sit to Supine: Unable to assess(End of session patient sitting in recliner chair)  Transfers  Sit to stand: Moderate assistance;2 Person assistance(Height of bed elevated)  Stand to sit: Minimal assistance  Transfer Comments: Patient required max A with 2 people assist with sit to stand. Min A for stand to sit holding onto bar of william steady to slow descent into the chair.         Cognition  Overall Cognitive Status: Exceptions  Arousal/Alertness: Appropriate responses to stimuli  Initiation: Requires cues for some  Sequencing: Requires cues for some     Assessment   Performance deficits / Impairments: Decreased cognition;Decreased endurance;Decreased strength;Decreased balance;Decreased functional mobility ; Decreased ADL status  Assessment: Discussed am pac score of 11 with OT. Patient would benefit from skilled OT to decrease caregiver burden. Patient completed functional mobility using the william steady with Mod A with 2 people with bed mobility. Continued plan of care  OT Education: OT Role;Plan of Care;Transfer Training  REQUIRES OT FOLLOW UP: Yes  Activity Tolerance  Activity Tolerance: Treatment limited secondary to decreased cognition;Patient Tolerated treatment well  Safety Devices  Type of devices: Chair alarm in place;Call light within reach; Left in chair;Nurse notified(End of session, patient sitting in recliner chair)           Plan   Plan  Times per week: 3-5x  Times per day: Daily  Current Treatment Recommendations: Strengthening, Wheelchair Mobility Training, Balance Training, Endurance Training, Functional Mobility Training, Safety Education & Training, Patient/Caregiver Education & Training, Equipment Evaluation, Education, & procurement, Self-Care / ADL    AM-PAC Score        AM-PAC Inpatient Daily Activity Raw Score: 11 (07/08/20 1434)  AM-PAC Inpatient ADL T-Scale Score : 29.04 (07/08/20 1434)  ADL Inpatient CMS 0-100% Score: 70.42 (07/08/20 1434)  ADL Inpatient CMS G-Code Modifier : CL (07/08/20 1434)    Goals  Short term goals  Time Frame for Short term goals: to be met by d/c: all goals ongoing  Short term goal 1: Pt will complete bed mobility with min a x1. Short term goal 2: Pt will complete fxl transfers via stand pivot mod a x2. Short term goal 3: Pt will tolerate sitting EOB x5-7 mins SBA to participate in ADLs. Short term goal 4: Pt will tolerate BUE HEP x10-15 mins to increase endurance and strength for ADLs and fxl transfers/bed mobility.   Long term goals  Time Frame for Long term goals : same as STGs  Patient Goals   Patient goals : pt did not formulate a goal       Therapy Time   Individual Concurrent Group Co-treatment   Time In 1405         Time Out 1435         Minutes 30            If discharged prior to next session, please see last daily note for status.     I attest that I was present for and made a skilled and mindful clinical judgement during the treatment of this patient 7/8/2020    [facility] COTA/L [facility], S/EDDIE

## 2020-07-08 NOTE — PLAN OF CARE
Problem: Skin Integrity:  Goal: Will show no infection signs and symptoms  Description: Will show no infection signs and symptoms  7/8/2020 1039 by Emma Lopez RN  Outcome: Ongoing  7/8/2020 0710 by Stephan Soares RN  Outcome: Ongoing  Goal: Absence of new skin breakdown  Description: Absence of new skin breakdown  7/8/2020 1039 by Emma Lopez RN  Outcome: Ongoing  7/8/2020 0710 by Stephan Soares RN  Outcome: Ongoing  Note: Skin assessment performed each shift per protocol. Patient turned and repositioned every two hours and prn with pillow support. Patient checked for incontence every two hours. Preventative sacral mepilex intact to sacrum. Problem: Falls - Risk of:  Goal: Will remain free from falls  Description: Will remain free from falls  7/8/2020 1039 by Emma Lopez RN  Outcome: Ongoing  7/8/2020 0710 by Stephan Soares RN  Outcome: Ongoing  Note: Patient free from falls this shift. Fall precautions in place at all times. Bed in lowest position with two side rails up and wheels locked. Call light within reach. Patient able and agreeable to contact for safety appropriately. Problem: Pain:  Goal: Pain level will decrease  Description: Pain level will decrease  7/8/2020 1039 by Emma Lopez RN  Outcome: Ongoing  7/8/2020 0710 by Stephan Soares RN  Outcome: Ongoing  Note: Patient denied pain or discomfort. Sleep at long intervals, but woke easily. Encouraged to inform staff of any changes in condition. Problem: OXYGENATION/RESPIRATORY FUNCTION  Goal: Patient will maintain patent airway  7/8/2020 1039 by Emma Lopez RN  Outcome: Ongoing  7/8/2020 0710 by Stephan Soares RN  Outcome: Ongoing  Note: VSS. Peripheral pulse palpable. Skin pink, dry and warm. I&O monitor. Daily weight. Medication given as ordered. Education provided. Will continue to monitor.     Goal: Patient will achieve/maintain normal respiratory rate/effort  Description: Respiratory rate and effort will be within normal limits for the patient  7/8/2020 1039 by Sydnie Yoo RN  Outcome: Ongoing  7/8/2020 0710 by Gasper Ling RN  Outcome: Ongoing     Problem: HEMODYNAMIC STATUS  Goal: Patient has stable vital signs and fluid balance  7/8/2020 1039 by Sydnie Yoo RN  Outcome: Ongoing  7/8/2020 0710 by Gasper Ling RN  Outcome: Ongoing     Problem: FLUID AND ELECTROLYTE IMBALANCE  Goal: Fluid and electrolyte balance are achieved/maintained  7/8/2020 1039 by Sydnie Yoo RN  Outcome: Ongoing  7/8/2020 0710 by Gasper Ling RN  Outcome: Ongoing     Problem: ACTIVITY INTOLERANCE/IMPAIRED MOBILITY  Goal: Mobility/activity is maintained at optimum level for patient  7/8/2020 1039 by Sydnie Yoo RN  Outcome: Ongoing  7/8/2020 0710 by Gasper Ling RN  Outcome: Ongoing

## 2020-07-08 NOTE — PROGRESS NOTES
Nutrition Assessment    Type and Reason for Visit: Initial(los)    Nutrition Recommendations: Add chocolate Ensure Enlive bid to start  Remove salt packet from trays & add Mrs Neri Person: Pt with pmh of ETOH abuse (sober 20 months), HTN, HLD adm r/t fall with fx to right hip. Noted surgery on 7/6. Found to have developed heart failure during stay. Current diet orders are general / 2000 ml per day. Intake down with pt reporting that he eats until full. Agreed to adding Ensure Enlive bid to start. Will not modify diet strictly at this time r/t  decreased po. Malnutrition Assessment:  · Malnutrition Status: At risk for malnutrition  · Context: Acute illness or injury  · Findings of the 6 clinical characteristics of malnutrition (Minimum of 2 out of 6 clinical characteristics is required to make the diagnosis of moderate or severe Protein Calorie Malnutrition based on AND/ASPEN Guidelines):  1. Energy Intake-Less than or equal to 50% of estimated energy requirement, Greater than or equal to 5 days    2. Weight Loss-No significant weight loss,    3. Fat Loss-Unable to assess,    4. Muscle Loss-Unable to assess,    5. Fluid Accumulation-No significant fluid accumulation, Extremities  6.  Strength-Not measured    Nutrition Risk Level: Moderate    Nutrient Needs:  · Estimated Daily Total Kcal: 1437-2698 (28-32 x ABW 66 kg)  · Estimated Daily Protein (g): 79-92 (1.2-1.4 x ABW)  · Estimated Daily Total Fluid (ml/day): 2000 per MD    Nutrition Diagnosis:   · Problem: Increased nutrient needs  · Etiology: related to Increased demand for energy/nutrients     Signs and symptoms:  as evidenced by Presence of wounds    Objective Information:  · Nutrition-Focused Physical Findings: LBM on 6/30 Nursing addressing. Noted trace edema to BLE.   · Wound Type: Surgical Wound(right hip, laceration above right eye.)  · Current Nutrition Therapies:  · Oral Diet Orders: General, Fluid Restriction   · Oral Diet intake: 1-25%  · Anthropometric Measures:  · Ht: 5' 8\" (172.7 cm)   · Current Body Wt: 145 lb (65.8 kg)(actual today)  · Admission Body Wt: 145 lb (65.8 kg)(actual 7/1)  · Ideal Body Wt: 154 lb (69.9 kg), % Ideal Body 94%  · BMI Classification: BMI 18.5 - 24.9 Normal Weight    Nutrition Interventions:   Start ONS, Modify current diet(add a no salt packet to diet orders)  Continued Inpatient Monitoring    Nutrition Evaluation:   · Evaluation: Goals set   · Goals: consume >/= to 50 %    · Monitoring: Meal Intake, Supplement Intake, Wound Healing, Weight, Pertinent Labs, Diarrhea, Constipation      Electronically signed by Frank Zamarripa RD, LD on 7/8/20 at 4:35 PM EDT    Contact Number: [phone]

## 2020-07-08 NOTE — PROGRESS NOTES
Pulmonary Progress Note    Date of Admission: 7/1/2020   LOS: 7 days       CC:  Severe hypoxemia    Subjective:  Improving shortness of breath  Hip pain improved  les lower extremities edema     ROS:   No chest pain  No nausea      PHYSICAL EXAM:   Blood pressure (!) 115/58, pulse 68, temperature 97.9 °F (36.6 °C), temperature source Oral, resp. rate 16, height 5' 8\" (1.727 m), weight 145 lb 4.5 oz (65.9 kg), SpO2 95 %.'  Gen: No distress. ENT:   Resp: No accessory muscle use. No crackles. No wheezes. No rhonchi. CV: Regular rate. Regular rhythm. No murmur or rub. No edema. Skin: Warm, dry, normal texture and turgor. No nodule on exposed extremities. M/S: No cyanosis. No clubbing. No joint deformity. Psych: Awake.       Medications:    Scheduled Meds:   furosemide  40 mg Oral Daily    sodium chloride flush  10 mL Intravenous 2 times per day    enoxaparin  30 mg Subcutaneous Daily    cefTRIAXone (ROCEPHIN) IV  2 g Intravenous Q24H    ipratropium-albuterol  1 ampule Inhalation Q6H WA    amiodarone  200 mg Oral Daily    amitriptyline  12.5 mg Oral Nightly    calcium-vitamin D  1 tablet Oral Daily with breakfast    divalproex  250 mg Oral Nightly    gabapentin  100 mg Oral BID    polyvinyl alcohol  1 drop Both Eyes BID    melatonin  4.5 mg Oral Nightly    metoprolol succinate  50 mg Oral Daily    tamsulosin  0.4 mg Oral Daily    sodium chloride flush  10 mL Intravenous 2 times per day       Continuous Infusions:      PRN Meds:  methylnaltrexone, sodium chloride flush, HYDROmorphone **OR** HYDROmorphone, HYDROcodone 5 mg - acetaminophen, sodium chloride flush, acetaminophen **OR** acetaminophen, polyethylene glycol, promethazine **OR** ondansetron, potassium chloride **OR** potassium alternative oral replacement **OR** potassium chloride, magnesium sulfate    Labs reviewed:  CBC:   Recent Labs     07/06/20  0459 07/07/20  0622   WBC 12.7* 10.9   HGB 10.1* 8.8*   HCT 30.9* 26.9*   MCV 96.6 96.9  314     BMP:   Recent Labs     07/06/20  0459 07/07/20  0622 07/08/20  0604    140 141   K 3.0* 3.8 3.9    103 102   CO2 30 25 27   PHOS 3.6 5.1* 4.2   BUN 27* 36* 38*   CREATININE 1.5* 1.7* 2.0*     LIVER PROFILE:   No results for input(s): AST, ALT, LIPASE, BILIDIR, BILITOT, ALKPHOS in the last 72 hours. Invalid input(s): AMYLASE,  ALB  PT/INR: No results for input(s): PROTIME, INR in the last 72 hours. APTT: No results for input(s): APTT in the last 72 hours. UA:  No results for input(s): NITRITE, COLORU, PHUR, LABCAST, WBCUA, RBCUA, MUCUS, TRICHOMONAS, YEAST, BACTERIA, CLARITYU, SPECGRAV, LEUKOCYTESUR, UROBILINOGEN, BILIRUBINUR, BLOODU, GLUCOSEU, AMORPHOUS in the last 72 hours. Invalid input(s): Ines Mis  No results for input(s): PH, PCO2, PO2 in the last 72 hours. Cx:      Films:      Assessment:     Acute hypoxemic respiratory failure with saturations less than 90% on room air  History of COVID-19  Hip fracture  Acute kidney injury         Plan:      Abnormal radiograph with acute hypoxemic respiratory failure  -Echocardiogram with diastolic dysfunction   -Lasix, negative 3.1 L. Defer fluid status to cardiology, holding lasix  -Wean oxygen at bedside. Doing well on 4 L nasal cannula  -  ceftriaxone and azithromycin. X 7 and 5 days. -IS  - Wean O2 to sat >90%  - hypoxemia improving. PT OT will likely improve pulmonary status as well. Acute kidney injury     -increased 2 days in a row. - will hold lasix for today. - defer to cardiology when they round. Plan to restart tomorrow if Cr improves. Hip fracture  -Postop day 2         This note was transcribed using [id number] [employer]. Please disregard any translational errors.       Matt Ortega [facility], [facility] [phone]

## 2020-07-09 ENCOUNTER — APPOINTMENT (OUTPATIENT)
Dept: GENERAL RADIOLOGY | Age: 85
DRG: 469 | End: 2020-07-09
Payer: MEDICARE

## 2020-07-09 LAB
ALBUMIN SERPL-MCNC: 2.5 G/DL (ref 3.4–5)
ANION GAP SERPL CALCULATED.3IONS-SCNC: 13 MMOL/L (ref 3–16)
BUN BLDV-MCNC: 38 MG/DL (ref 7–20)
CALCIUM SERPL-MCNC: 8.7 MG/DL (ref 8.3–10.6)
CHLORIDE BLD-SCNC: 105 MMOL/L (ref 99–110)
CO2: 27 MMOL/L (ref 21–32)
CREAT SERPL-MCNC: 1.8 MG/DL (ref 0.8–1.3)
GFR AFRICAN AMERICAN: 43
GFR NON-AFRICAN AMERICAN: 36
GLUCOSE BLD-MCNC: 99 MG/DL (ref 70–99)
HCT VFR BLD CALC: 27.8 % (ref 40.5–52.5)
HEMOGLOBIN: 8.9 G/DL (ref 13.5–17.5)
PHOSPHORUS: 2.8 MG/DL (ref 2.5–4.9)
POTASSIUM SERPL-SCNC: 3.5 MMOL/L (ref 3.5–5.1)
SODIUM BLD-SCNC: 145 MMOL/L (ref 136–145)

## 2020-07-09 PROCEDURE — 2700000000 HC OXYGEN THERAPY PER DAY

## 2020-07-09 PROCEDURE — 94760 N-INVAS EAR/PLS OXIMETRY 1: CPT

## 2020-07-09 PROCEDURE — 74018 RADEX ABDOMEN 1 VIEW: CPT

## 2020-07-09 PROCEDURE — 6370000000 HC RX 637 (ALT 250 FOR IP): Performed by: PHYSICIAN ASSISTANT

## 2020-07-09 PROCEDURE — 85014 HEMATOCRIT: CPT

## 2020-07-09 PROCEDURE — 94669 MECHANICAL CHEST WALL OSCILL: CPT

## 2020-07-09 PROCEDURE — 6360000002 HC RX W HCPCS: Performed by: PHYSICIAN ASSISTANT

## 2020-07-09 PROCEDURE — 85018 HEMOGLOBIN: CPT

## 2020-07-09 PROCEDURE — 94761 N-INVAS EAR/PLS OXIMETRY MLT: CPT

## 2020-07-09 PROCEDURE — 2580000003 HC RX 258: Performed by: PHYSICIAN ASSISTANT

## 2020-07-09 PROCEDURE — 99232 SBSQ HOSP IP/OBS MODERATE 35: CPT | Performed by: INTERNAL MEDICINE

## 2020-07-09 PROCEDURE — 97530 THERAPEUTIC ACTIVITIES: CPT

## 2020-07-09 PROCEDURE — 6370000000 HC RX 637 (ALT 250 FOR IP): Performed by: FAMILY MEDICINE

## 2020-07-09 PROCEDURE — 94640 AIRWAY INHALATION TREATMENT: CPT

## 2020-07-09 PROCEDURE — 97535 SELF CARE MNGMENT TRAINING: CPT

## 2020-07-09 PROCEDURE — 2060000000 HC ICU INTERMEDIATE R&B

## 2020-07-09 PROCEDURE — 80069 RENAL FUNCTION PANEL: CPT

## 2020-07-09 RX ORDER — SODIUM PHOSPHATE, DIBASIC AND SODIUM PHOSPHATE, MONOBASIC 7; 19 G/133ML; G/133ML
1 ENEMA RECTAL
Status: DISPENSED | OUTPATIENT
Start: 2020-07-09 | End: 2020-07-09

## 2020-07-09 RX ORDER — SENNA PLUS 8.6 MG/1
1 TABLET ORAL 2 TIMES DAILY
Status: DISCONTINUED | OUTPATIENT
Start: 2020-07-09 | End: 2020-07-10 | Stop reason: HOSPADM

## 2020-07-09 RX ORDER — POLYETHYLENE GLYCOL 3350 17 G/17G
17 POWDER, FOR SOLUTION ORAL 2 TIMES DAILY
Status: DISCONTINUED | OUTPATIENT
Start: 2020-07-09 | End: 2020-07-10 | Stop reason: HOSPADM

## 2020-07-09 RX ADMIN — POLYETHYLENE GLYCOL 3350 17 G: 17 POWDER, FOR SOLUTION ORAL at 09:07

## 2020-07-09 RX ADMIN — IPRATROPIUM BROMIDE AND ALBUTEROL SULFATE 1 AMPULE: .5; 3 SOLUTION RESPIRATORY (INHALATION) at 12:42

## 2020-07-09 RX ADMIN — TAMSULOSIN HYDROCHLORIDE 0.4 MG: 0.4 CAPSULE ORAL at 09:07

## 2020-07-09 RX ADMIN — ENOXAPARIN SODIUM 30 MG: 30 INJECTION SUBCUTANEOUS at 09:07

## 2020-07-09 RX ADMIN — POLYVINYL ALCOHOL 1 DROP: 14 SOLUTION/ DROPS OPHTHALMIC at 09:08

## 2020-07-09 RX ADMIN — SODIUM CHLORIDE, PRESERVATIVE FREE 10 ML: 5 INJECTION INTRAVENOUS at 20:25

## 2020-07-09 RX ADMIN — POLYVINYL ALCOHOL 1 DROP: 14 SOLUTION/ DROPS OPHTHALMIC at 21:02

## 2020-07-09 RX ADMIN — HYDROCODONE BITARTRATE AND ACETAMINOPHEN 1 TABLET: 5; 325 TABLET ORAL at 16:05

## 2020-07-09 RX ADMIN — GABAPENTIN 100 MG: 100 CAPSULE ORAL at 20:59

## 2020-07-09 RX ADMIN — SODIUM CHLORIDE, PRESERVATIVE FREE 10 ML: 5 INJECTION INTRAVENOUS at 09:09

## 2020-07-09 RX ADMIN — POLYVINYL ALCOHOL 1 DROP: 14 SOLUTION/ DROPS OPHTHALMIC at 00:33

## 2020-07-09 RX ADMIN — POLYETHYLENE GLYCOL 3350 17 G: 17 POWDER, FOR SOLUTION ORAL at 20:59

## 2020-07-09 RX ADMIN — METHYLNALTREXONE BROMIDE 12 MG: 12 INJECTION, SOLUTION SUBCUTANEOUS at 12:45

## 2020-07-09 RX ADMIN — CEFTRIAXONE 2 G: 2 INJECTION, POWDER, FOR SOLUTION INTRAMUSCULAR; INTRAVENOUS at 16:08

## 2020-07-09 RX ADMIN — SENNOSIDES 8.6 MG: 8.6 TABLET, FILM COATED ORAL at 20:59

## 2020-07-09 RX ADMIN — OYSTER SHELL CALCIUM WITH VITAMIN D 1 TABLET: 500; 200 TABLET, FILM COATED ORAL at 09:07

## 2020-07-09 RX ADMIN — Medication 4.5 MG: at 20:59

## 2020-07-09 RX ADMIN — DIVALPROEX SODIUM 250 MG: 125 CAPSULE, COATED PELLETS ORAL at 20:59

## 2020-07-09 RX ADMIN — HYDROCODONE BITARTRATE AND ACETAMINOPHEN 1 TABLET: 5; 325 TABLET ORAL at 09:07

## 2020-07-09 RX ADMIN — METOPROLOL SUCCINATE 50 MG: 50 TABLET, EXTENDED RELEASE ORAL at 09:07

## 2020-07-09 RX ADMIN — SODIUM CHLORIDE, PRESERVATIVE FREE 10 ML: 5 INJECTION INTRAVENOUS at 20:26

## 2020-07-09 RX ADMIN — GABAPENTIN 100 MG: 100 CAPSULE ORAL at 09:07

## 2020-07-09 RX ADMIN — AMITRIPTYLINE HYDROCHLORIDE 12.5 MG: 25 TABLET, FILM COATED ORAL at 20:59

## 2020-07-09 RX ADMIN — HYDROCODONE BITARTRATE AND ACETAMINOPHEN 1 TABLET: 5; 325 TABLET ORAL at 22:35

## 2020-07-09 RX ADMIN — HYDROCODONE BITARTRATE AND ACETAMINOPHEN 1 TABLET: 5; 325 TABLET ORAL at 00:31

## 2020-07-09 RX ADMIN — AMIODARONE HYDROCHLORIDE 200 MG: 200 TABLET ORAL at 09:07

## 2020-07-09 RX ADMIN — IPRATROPIUM BROMIDE AND ALBUTEROL SULFATE 1 AMPULE: .5; 3 SOLUTION RESPIRATORY (INHALATION) at 20:07

## 2020-07-09 RX ADMIN — IPRATROPIUM BROMIDE AND ALBUTEROL SULFATE 1 AMPULE: .5; 3 SOLUTION RESPIRATORY (INHALATION) at 08:00

## 2020-07-09 ASSESSMENT — PAIN - FUNCTIONAL ASSESSMENT
PAIN_FUNCTIONAL_ASSESSMENT: PREVENTS OR INTERFERES SOME ACTIVE ACTIVITIES AND ADLS

## 2020-07-09 ASSESSMENT — PAIN DESCRIPTION - ONSET
ONSET: ON-GOING

## 2020-07-09 ASSESSMENT — PAIN DESCRIPTION - DESCRIPTORS
DESCRIPTORS: CONSTANT
DESCRIPTORS: ACHING

## 2020-07-09 ASSESSMENT — PAIN DESCRIPTION - FREQUENCY
FREQUENCY: INTERMITTENT
FREQUENCY: CONTINUOUS
FREQUENCY: INTERMITTENT

## 2020-07-09 ASSESSMENT — PAIN DESCRIPTION - PROGRESSION
CLINICAL_PROGRESSION: GRADUALLY WORSENING
CLINICAL_PROGRESSION: GRADUALLY IMPROVING
CLINICAL_PROGRESSION: GRADUALLY IMPROVING
CLINICAL_PROGRESSION: GRADUALLY WORSENING
CLINICAL_PROGRESSION: GRADUALLY IMPROVING
CLINICAL_PROGRESSION: GRADUALLY WORSENING
CLINICAL_PROGRESSION: GRADUALLY WORSENING
CLINICAL_PROGRESSION: GRADUALLY IMPROVING
CLINICAL_PROGRESSION: GRADUALLY WORSENING
CLINICAL_PROGRESSION: GRADUALLY WORSENING
CLINICAL_PROGRESSION: GRADUALLY IMPROVING

## 2020-07-09 ASSESSMENT — PAIN DESCRIPTION - PAIN TYPE
TYPE: SURGICAL PAIN
TYPE: ACUTE PAIN
TYPE: SURGICAL PAIN

## 2020-07-09 ASSESSMENT — PAIN DESCRIPTION - ORIENTATION
ORIENTATION: RIGHT

## 2020-07-09 ASSESSMENT — PAIN SCALES - GENERAL
PAINLEVEL_OUTOF10: 8
PAINLEVEL_OUTOF10: 8
PAINLEVEL_OUTOF10: 0
PAINLEVEL_OUTOF10: 7
PAINLEVEL_OUTOF10: 6
PAINLEVEL_OUTOF10: 0
PAINLEVEL_OUTOF10: 4
PAINLEVEL_OUTOF10: 0

## 2020-07-09 ASSESSMENT — PAIN DESCRIPTION - LOCATION
LOCATION: HIP
LOCATION: BACK
LOCATION: HIP

## 2020-07-09 ASSESSMENT — PAIN SCALES - WONG BAKER
WONGBAKER_NUMERICALRESPONSE: 8
WONGBAKER_NUMERICALRESPONSE: 8

## 2020-07-09 NOTE — PROGRESS NOTES
Pt sat up in chair for a few hours this shift. He was able to finally have a large BM after receiving PRN Relistor. He has had intermittent c/o R hip pain. PRN Norco administered with good results. Pt has remained SR on telemetry. Plan is for pt to discharge to [facility]. Will continue to monitor.

## 2020-07-09 NOTE — PLAN OF CARE
Problem: Skin Integrity:  Goal: Will show no infection signs and symptoms  Description: Will show no infection signs and symptoms  Outcome: Ongoing  Goal: Absence of new skin breakdown  Description: Absence of new skin breakdown  Outcome: Ongoing   Skin assessment completed every shift. Pt assessed for incontinence, appropriate barrier wipes used prn. Pt encouraged to turn/rotate every 2 hours. Assistance provided if pt unable to do so themselves. Problem: Falls - Risk of:  Goal: Will remain free from falls  Description: Will remain free from falls  Outcome: Ongoing   Fall risk assessment completed every shift. All precautions in place. Pt has call light within reach at all times. Room clear of clutter. Pt aware to call for assistance when getting up. Problem: Pain:  Goal: Pain level will decrease  Description: Pain level will decrease  Outcome: Ongoing   Pain/discomfort being managed with PRN analgesics per MD orders. Pt able to express presence and absence of pain and rate pain appropriately using numerical scale. Problem: OXYGENATION/RESPIRATORY FUNCTION  Goal: Patient will maintain patent airway  Outcome: Ongoing  Pt is able to maintain a patent airway. Goal: Patient will achieve/maintain normal respiratory rate/effort  Description: Respiratory rate and effort will be within normal limits for the patient  Outcome: Ongoing   RR remains WNL. O2 sats remain above 90% on 2.5L/NC. will monitor.   Problem: HEMODYNAMIC STATUS  Goal: Patient has stable vital signs and fluid balance  Outcome: Ongoing     Problem: FLUID AND ELECTROLYTE IMBALANCE  Goal: Fluid and electrolyte balance are achieved/maintained  Outcome: Ongoing     Problem: ACTIVITY INTOLERANCE/IMPAIRED MOBILITY  Goal: Mobility/activity is maintained at optimum level for patient  Outcome: Ongoing     Problem: Nutrition  Goal: Optimal nutrition therapy  Outcome: Ongoing

## 2020-07-09 NOTE — PLAN OF CARE
Problem: Skin Integrity:  Goal: Will show no infection signs and symptoms  Description: Will show no infection signs and symptoms  7/9/2020 1111 by Daya Sy RN  Outcome: Ongoing  Note: Pt with surgical incision to R hip. He also has scattered bruising and blanchable redness to sacrum, mepilex in place. Healing laceration to R eye. Will monitor for changes. 7/9/2020 0435 by Wilton Ordoñez RN  Outcome: Ongoing  Goal: Absence of new skin breakdown  Description: Absence of new skin breakdown  7/9/2020 1111 by Daya Sy RN  Outcome: Ongoing  Note: No new areas of skin breakdown noted. Will monitor for changes. 7/9/2020 0435 by Wilton Ordoñez RN  Outcome: Ongoing     Problem: Falls - Risk of:  Goal: Will remain free from falls  Description: Will remain free from falls  7/9/2020 1111 by Daya Sy RN  Outcome: Ongoing  Note: Pt free from injury or falls at this time, fall precautions in place, bed in low position, side rail up x2, [place] Fall Risk: High (45 and higher), bed alarm on, reoriented to room and call light, reminded not to get up without assistance, call light in reach, will continue to monitor. Pt verbalizes understanding of fall risk procedures. 7/9/2020 0435 by Wilton Ordoñez RN  Outcome: Ongoing     Problem: Pain:  Goal: Pain level will decrease  Description: Pain level will decrease  7/9/2020 1111 by Daya Sy RN  Outcome: Ongoing  Note: Pt reported pain in R hip with movement. PRN Norco administered with good results.    7/9/2020 0435 by Wilton Ordoñez RN  Outcome: Ongoing     Problem: OXYGENATION/RESPIRATORY FUNCTION  Goal: Patient will maintain patent airway  7/9/2020 1111 by Daya Sy RN  Outcome: Ongoing  Note: Patient has maintained a patent airway, repositions self, lung sounds bilaterally clear/diminished, no cough noted, RT is involved in patient care and is providing care, patient has had adequate fluid intake and is on a 2000 mL fluid restriction, no need to suction airway at this time, educated patient to turn and cough and deep breathe every two hours and as needed, encouraged incentive spirometer and patient has been performing. Will continue to monitor and reassess. 7/9/2020 0435 by Timo Groves RN  Outcome: Ongoing  Goal: Patient will achieve/maintain normal respiratory rate/effort  Description: Respiratory rate and effort will be within normal limits for the patient  7/9/2020 1111 by Tommie Tripathi RN  Outcome: Ongoing  Note: Vital signs stable, respiratory rate normal, heart rate is WNLs and regular on cardiac monitor, +2 pulses and less than 3 second cap refill noted in all extremities, body color appropriate for ethnicity and temperature warm, edema noted to BLE, patient repositions  self, and daily weights are ordered and no change from yesterday's weight. Will continue to monitor and reassess. 7/9/2020 0435 by Timo Groves RN  Outcome: Ongoing     Problem: HEMODYNAMIC STATUS  Goal: Patient has stable vital signs and fluid balance  7/9/2020 1111 by Tommie Tripathi RN  Outcome: Ongoing  Note: Vital signs stable, respiratory rate normal, heart rate is WNLs and regular on cardiac monitor, +2 pulses and less than 3 second cap refill noted in all extremities, body color appropriate for ethnicity and temperature warm, edema noted to BLE, patient repositions  self, and daily weights are ordered and no change from yesterday's weight. Will continue to monitor and reassess. 7/9/2020 0435 by Timo Groves RN  Outcome: Ongoing     Problem: FLUID AND ELECTROLYTE IMBALANCE  Goal: Fluid and electrolyte balance are achieved/maintained  7/9/2020 1111 by Tommie Tripathi RN  Outcome: Ongoing  Note: Pt's electrolytes are WDL this shift.   7/9/2020 0435 by Timo Groves RN  Outcome: Ongoing     Problem: ACTIVITY INTOLERANCE/IMPAIRED MOBILITY  Goal: Mobility/activity is maintained at optimum level for patient  7/9/2020 1111 by Lucina Bryant RN  Outcome: Ongoing  Note: Utilizing stedy x 2 for transfers from bed to chair, pt tolerating well. 7/9/2020 0435 by Miguelina Alaniz RN  Outcome: Ongoing     Problem: Nutrition  Goal: Optimal nutrition therapy  7/9/2020 1111 by Lucina Bryant RN  Outcome: Ongoing  Note: Pt completing 26-50% of meal trays this shift.   7/9/2020 0435 by Miguelina Alaniz RN  Outcome: Ongoing

## 2020-07-09 NOTE — PROGRESS NOTES
to the emergency department for evaluation after fall from bed. The patient states that he was trying to get out of bed but his feet got caught in the sheets and he fell, landing on the right side of his face and right hip on the floor. He is not on blood thinners. He thinks that he lost consciousness for several seconds. He denies severe headache, retrograde amnesia, confusion, nausea or vomiting. He reports 10 out of 10 right hip pain that is aching and throbbing and constant. He is unable to move the right leg due to hip pain. No numbness or tingling. No neck or back pain. No other acute concerns, associated symptoms or modifying factors. \" Pt underwent RIGHT HIP HEMIARTHROPLASTY on 7/6/2020 s/p Right hip avascular necrosis #2 right hip femoral neck fracture, now with posterior hip precautions. PMH includes: HLD, HTN, left knee arthritis, PAF, afib, alcohol abuse, left hip fracture surgery. Response to previous treatment: Patient with no complaints from previous session  Family / Caregiver Present: No  Referring Practitioner: Venita Tomlin PA-C  Diagnosis: right hip fracture s/p fall, right hip hemiarthroplasty  Subjective  Subjective: Patient supine in bed upon arrival to room with PT. Patient agreeable to therapy      Orientation  Orientation  Overall Orientation Status: Impaired  Orientation Level: Oriented to person;Disoriented to time  Objective             Balance  Sitting Balance: Minimal assistance  Standing Balance:  Moderate assistance(Mod A of 2)  Standing Balance  Activity: Static standing in william stedy   Functional Mobility  Assist Level: Dependent/Total  Functional Mobility Comments: Functional mobility using the william steady from bed to recliner chair  Bed mobility  Supine to Sit: Maximum assistance;2 Person assistance(Head of bed elevated and side rails utilized)  Sit to Supine: Unable to assess(up in chair at end of session)  Transfers  Sit to stand: Maximum assistance;2 Person assistance  Stand to sit: Moderate assistance;2 Person assistance  Transfer Comments: Sit to stand from elevated bed to william stedy, stand to sit with Mod A of 2     Cognition  Overall Cognitive Status: Exceptions  Arousal/Alertness: Appropriate responses to stimuli  Following Commands: Follows multistep commands with increased time  Memory: Decreased recall of recent events;Decreased short term memory;Decreased long term memory;Decreased recall of precautions  Safety Judgement: Decreased awareness of need for assistance  Insights: Decreased awareness of deficits  Initiation: Requires cues for some  Sequencing: Requires cues for some  Cognition Comment: Pt is a poor historian. SECOND SESSION: Patient seated in recliner chair upon arrival to room with PT, nurse present. Patient sliding forward in chair. Dependent of 2 for scooting back in chair. Sit<> stand with Mod A of 2 from recliner chair, BSC over commode and bed. Patient incont of BM. Dependent for all toileting needs. Use of william stedy for safe mobility. Sit to supine with Max A of 2. Patient left in bed with needs in reach, bed alarm active and nurse aware. Assessment   Performance deficits / Impairments: Decreased cognition;Decreased endurance;Decreased strength;Decreased balance;Decreased functional mobility ; Decreased ADL status  Assessment: Discussed with OTR am pac socre is 11 which indicates need for continued skilled OT to increase Sonoita and decrease caregiver burden. Max A of 2 for sit to supine, sit to stand from elevated height of bed with Max A of 2, stand to sit with Mod A of 2. Use of william stedy for safe transfers/mobility. Cont with POC. OT Education: OT Role;Plan of Care;Transfer Training  REQUIRES OT FOLLOW UP: Yes  Activity Tolerance  Activity Tolerance: Patient limited by pain  Safety Devices  Safety Devices in place: Yes  Type of devices: Chair alarm in place;Call light within reach; Left in chair;Nurse notified Plan   Plan  Times per week: 3-5x  Times per day: Daily  Current Treatment Recommendations: Strengthening, Wheelchair Mobility Training, Balance Training, Endurance Training, Functional Mobility Training, Safety Education & Training, Patient/Caregiver Education & Training, Equipment Evaluation, Education, & procurement, Self-Care / ADL    AM-PAC Score        AM-PAC Inpatient Daily Activity Raw Score: 11 (07/09/20 1137)  AM-PAC Inpatient ADL T-Scale Score : 29.04 (07/09/20 1137)  ADL Inpatient CMS 0-100% Score: 70.42 (07/09/20 1137)  ADL Inpatient CMS G-Code Modifier : CL (07/09/20 1137)    Goals  Short term goals  Time Frame for Short term goals: to be met by d/c: all goals ongoing  Short term goal 1: Pt will complete bed mobility with min a x1. Short term goal 2: Pt will complete fxl transfers via stand pivot mod a x2. Short term goal 3: Pt will tolerate sitting EOB x5-7 mins SBA to participate in ADLs. Short term goal 4: Pt will tolerate BUE HEP x10-15 mins to increase endurance and strength for ADLs and fxl transfers/bed mobility.   Long term goals  Time Frame for Long term goals : same as STGs  Patient Goals   Patient goals : pt did not formulate a goal       Therapy Time   Individual Concurrent Group Co-treatment   Time In 1100         Time Out 1125         Minutes 25           Therapy Time     Individual Co-treatment   Time In 1400    Time Out 1430    Minutes 30             Electronically signed by Angel Luis Hogan [id number] on 7/9/2020 at 11:44 AM    If discharged prior to next OT session please see last daily note for discharge status

## 2020-07-09 NOTE — PROGRESS NOTES
Pt's family called for update, questions answered. Relayed questions/concerns to MD via secure messaging.

## 2020-07-09 NOTE — PROGRESS NOTES
mL Intravenous 2 times per day    enoxaparin  30 mg Subcutaneous Daily    cefTRIAXone (ROCEPHIN) IV  2 g Intravenous Q24H    ipratropium-albuterol  1 ampule Inhalation Q6H WA    amiodarone  200 mg Oral Daily    amitriptyline  12.5 mg Oral Nightly    calcium-vitamin D  1 tablet Oral Daily with breakfast    divalproex  250 mg Oral Nightly    gabapentin  100 mg Oral BID    polyvinyl alcohol  1 drop Both Eyes BID    melatonin  4.5 mg Oral Nightly    metoprolol succinate  50 mg Oral Daily    tamsulosin  0.4 mg Oral Daily    sodium chloride flush  10 mL Intravenous 2 times per day     PRN Meds: methylnaltrexone, sodium chloride flush, HYDROmorphone **OR** HYDROmorphone, HYDROcodone 5 mg - acetaminophen, sodium chloride flush, acetaminophen **OR** acetaminophen, polyethylene glycol, promethazine **OR** ondansetron      Intake/Output Summary (Last 24 hours) at 7/9/2020 0856  Last data filed at 7/9/2020 0853  Gross per 24 hour   Intake 740 ml   Output 250 ml   Net 490 ml       Physical Exam Performed:    /64   Pulse 71   Temp 97.7 °F (36.5 °C) (Oral)   Resp 18   Ht 5' 8\" (1.727 m)   Wt 145 lb 11.6 oz (66.1 kg)   SpO2 91%   BMI 22.16 kg/m²       General appearance: No apparent distress, alert and cooperative. HEENT: Conjunctivae/corneas clear, neck supple w/ full ROM  Respiratory:  Normal respiratory effort. Clear to auscultation, bilaterally without Rales/Wheezes/Rhonchi. Cardiovascular: Regular rate,  normal S1/S2  Abdomen: Soft, non-tender, non-distended with normal bowel sounds.   Musculoskeletal: trace edema bilaterally, incision c/d/i  Neurologic:  No new focal deficits  Psychiatric: Alert and oriented, normal insight  Peripheral Pulses: +2 palpable, equal bilaterally       Labs:   Recent Labs     07/07/20  0622 07/09/20  0436   WBC 10.9  --    HGB 8.8* 8.9*   HCT 26.9* 27.8*     --      Recent Labs     07/07/20  0622 07/08/20  0604 07/09/20  0436    141 145   K 3.8 3.9 3.5   CL microvascular   ischemia         CT CERVICAL SPINE WO CONTRAST   Final Result   No acute abnormality of the cervical spine.          XR HIP RIGHT (2-3 VIEWS)   Final Result   Subcapital right femoral neck fracture, may be pathologic as there are   possible destructive bony lesions within the right femoral neck                 Assessment/Plan:    Active Hospital Problems    Diagnosis    Acute respiratory failure with hypoxia ([facility]) [J96.01]    COVID-19 [U07.1]    Closed fracture of right hip ([facility]) [S72.001A]     Acute decompensated diastolic HF  - s/p IV lasix  - oral lasix held for mild bump creatinine, if creatine improving tomorrow will resume  - cardiology following    Acute respiratory failure with hypoxia  - on 2 L NC  - 2/2 chf exacerbation  - completed azithromycin, last dose ceftriaxone today    Acute R hip fracture s/p fall  - ORIF POD #3  - mgmt per ortho  - on lovenox IP - plans for asa 81 mg BID x 30 days at discharge    Constipation  - KUB - no obstruction  - will schedule miralax, add senna    ARIANE on CKD stage III  - lasix held yesterday, continue to hold, creatinine trending down   - if stable tomorrow will resume lasix    Postop anemia  - Hb stable    Hx PAF - c/w home meds    Recent COVID-19 infection - resolved    DVT Prophylaxis: lovenox  Diet: DIET GENERAL; Daily Fluid Restriction: 2000 ml  Dietary Nutrition Supplements: Standard High Calorie Oral Supplement  Code Status: DNR-CCA      Dispo - Discharge in AM pending Ami Hayward MD

## 2020-07-09 NOTE — PROGRESS NOTES
[id number] [facility] Orthopedic Surgery   Progress Note      S/P :  SUBJECTIVE  IN bed. Alert and oriented. Going for xray, no BM for several days. Pain is   described in righthip and with the intensity of none at rest.     OBJECTIVE              Physical                      VITALS:  /64   Pulse 71   Temp 97.7 °F (36.5 °C) (Oral)   Resp 18   Ht 5' 8\" (1.727 m)   Wt 145 lb 11.6 oz (66.1 kg)   SpO2 91%   BMI 22.16 kg/m²                     MUSCULOSKELETAL:  right foot NVI. Wiggles toes to command. Pedal pulses are palpable. NEUROLOGIC:                                  Sensory:  Touch:  Right Lower Extremity:  normal                                                 Surgical wound appears clean and dry right hip with Prineo dressing.      Data       CBC:   Lab Results   Component Value Date    WBC 10.9 07/07/2020    RBC 2.78 07/07/2020    HGB 8.9 07/09/2020    HCT 27.8 07/09/2020    MCV 96.9 07/07/2020    MCH 31.6 07/07/2020    MCHC 32.7 07/07/2020    RDW 14.9 07/07/2020     07/07/2020    MPV 7.9 07/07/2020        WBC:    Lab Results   Component Value Date    WBC 10.9 07/07/2020        Hemoglobin/Hematocrit:    Lab Results   Component Value Date    HGB 8.9 07/09/2020    HCT 27.8 07/09/2020        PT/INR:    Lab Results   Component Value Date    PROTIME 13.1 06/17/2018    INR 1.15 06/17/2018              Current Inpatient Medications             Current Facility-Administered Medications: [Held by provider] furosemide (LASIX) tablet 40 mg, 40 mg, Oral, Daily  methylnaltrexone (RELISTOR) injection 12 mg, 12 mg, Subcutaneous, Q12H PRN  sodium chloride flush 0.9 % injection 10 mL, 10 mL, Intravenous, 2 times per day  sodium chloride flush 0.9 % injection 10 mL, 10 mL, Intravenous, PRN  HYDROmorphone (DILAUDID) injection 0.25 mg, 0.25 mg, Intravenous, Q3H PRN **OR** HYDROmorphone (DILAUDID) injection 0.5 mg, 0.5 mg, Intravenous, Q3H PRN  HYDROcodone-acetaminophen (NORCO) 5-325 MG per tablet 1 tablet, 1 tablet, Oral, Q4H PRN  enoxaparin (LOVENOX) injection 30 mg, 30 mg, Subcutaneous, Daily  cefTRIAXone (ROCEPHIN) 2 g IVPB in D5W 50ml minibag, 2 g, Intravenous, Q24H  ipratropium-albuterol (DUONEB) nebulizer solution 1 ampule, 1 ampule, Inhalation, Q6H WA  amiodarone (CORDARONE) tablet 200 mg, 200 mg, Oral, Daily  amitriptyline (ELAVIL) tablet 12.5 mg, 12.5 mg, Oral, Nightly  calcium-vitamin D 500-200 MG-UNIT per tablet 1 tablet, 1 tablet, Oral, Daily with breakfast  divalproex (DEPAKOTE SPRINKLE) capsule 250 mg, 250 mg, Oral, Nightly  gabapentin (NEURONTIN) capsule 100 mg, 100 mg, Oral, BID  polyvinyl alcohol (LIQUIFILM TEARS) 1.4 % ophthalmic solution 1 drop, 1 drop, Both Eyes, BID  melatonin tablet 4.5 mg, 4.5 mg, Oral, Nightly  metoprolol succinate (TOPROL XL) extended release tablet 50 mg, 50 mg, Oral, Daily  tamsulosin (FLOMAX) capsule 0.4 mg, 0.4 mg, Oral, Daily  sodium chloride flush 0.9 % injection 10 mL, 10 mL, Intravenous, 2 times per day  sodium chloride flush 0.9 % injection 10 mL, 10 mL, Intravenous, PRN  acetaminophen (TYLENOL) tablet 650 mg, 650 mg, Oral, Q6H PRN **OR** acetaminophen (TYLENOL) suppository 650 mg, 650 mg, Rectal, Q6H PRN  polyethylene glycol (GLYCOLAX) packet 17 g, 17 g, Oral, Daily PRN  promethazine (PHENERGAN) tablet 12.5 mg, 12.5 mg, Oral, Q6H PRN **OR** ondansetron (ZOFRAN) injection 4 mg, 4 mg, Intravenous, Q6H PRN    ASSESSMENT AND PLAN    Fall  Right hip fx, post hemiarthroplasty, stable exam  DVT prophylaxis ordered, on lovenox inpatient, Aspirin 81 mg BID X30 days after discharge  PT OT for ADL's and ambulation as tolerated, posterior hip precautions  SS for DC planning, ECF  IV or PO pain med as ordered  Constipation, can add fleets enema if needed.      Akua Mendosa  7/9/2020  10:51 AM

## 2020-07-09 NOTE — PROGRESS NOTES
Physical Therapy    Facility/Department: [facility] 3W ORTHOPEDICS  Treatment note    NAME: Cesar Yu  : 1932  MRN: [id number]    Date of Service: 2020    Assessment / Discharge Recommendations:  -continues to engage well for efforts to mobilize from bed to recliner  -he is tolerating weight bearing well in the william stedy  -plans discharge to [facility] for continued PT OT and nursing care  -goal for return to assumed baseline of pivots to wheelchair is good      Patient Diagnosis(es): The primary encounter diagnosis was Closed fracture of neck of right femur, initial encounter ([facility] [address] Diagnoses of Fall from bed, initial encounter, Injury of head, initial encounter, and Facial laceration, initial encounter were also pertinent to this visit. has a past medical history of Alcohol abuse, Arthritis of left knee, Atrial fibrillation ([facility] [address] HTN (hypertension), Hyperlipidemia, and PAF (paroxysmal atrial fibrillation) ([facility] [address] has a past surgical history that includes Hip fracture surgery (Left) and hip surgery (Right, 2020). Restrictions  Restrictions/Precautions  Restrictions/Precautions: Fall Risk  Position Activity Restriction  Other position/activity restrictions: right hip posterior precautions  Vision/Hearing  Vision: Within Functional Limits  Hearing: Exceptions to [facility]  Hearing Exceptions: Hard of hearing/hearing concerns;Bilateral hearing aid     Subjective  General  Chart Reviewed: Yes  Additional Pertinent Hx: here due to fall at LT with right hip fracture  -PLOF from wheelchair as primary mobility  Response To Previous Treatment: Patient with no complaints from previous session.   Family / Caregiver Present: No  Follows Commands: Within Functional Limits  Subjective  Subjective: to room along with OT in AM to assist from bed to recliner (~1125) and again to assist with nursing following bowel movement (in recliner) to transfer via william stedy to bathroom commode (~1400)  Pain Screening  Patient Currently in Pain: (little pain at rest - initial bed mobility or up to stance following time in recliner is moderately painful)  Social/Functional History  Social/Functional History  Type of Home: Facility([facility])  ADL Assistance: Needs assistance  Ambulation Assistance: (w/c for past 5 years- reports independent to propel)  Transfer Assistance: Needs assistance(can't remember if he used lift equipment or stand pivot)  Additional Comments: Per Sydney PALAFOX's note 7/2/2020: \"Corey reviewed patient's chart. He is from [facility]. Boni Fleischer at [facility] confirmed he is from long term care but was ambulatory before. She reported he will need a PT/OT eval for skilled needs at [place]. Patient will also need a [facility] precert. \"  Objective  Bed mobility  Supine to Sit: Maximum assistance;2 Person assistance  Sit to Supine: Maximum assistance;2 Person assistance  Transfers  Sit to Stand: Moderate Assistance;2 Person Assistance(in stedy)  Stand to sit: Minimal Assistance;2 Person Assistance  Ambulation  Ambulation?: No  Stairs/Curb  Stairs?: No  Balance  Comments: midline in sitting at the EOB with mod assist    - near midline in static stance but in moderately flexed posture  Plan   Plan  Times per week: qd x1-2 then 3-5 while on acute floor  Current Treatment Recommendations: Functional Mobility Training, Transfer Training, Modalities, Positioning, Patient/Caregiver Education & Training, ADL/Self-care Training  Safety Devices  Type of devices:  All fall risk precautions in place, Bed alarm in place, Call light within reach, Left in bed, Nurse notified    AM-PAC Score  AM-PAC Inpatient Mobility Raw Score : 9 (07/08/20 1443)  AM-PAC Inpatient T-Scale Score : 30.55 (07/08/20 1443)  Mobility Inpatient CMS 0-100% Score: 81.38 (07/08/20 1443)  Mobility Inpatient CMS G-Code Modifier : CM (07/08/20 1443)    Goals  Short term goals  Time Frame for Short term goals: 1-2 days - goals ongoing  Short term goal 1: bed mobility at mod assist of 2  Short term goal 2: transfers at mod assist of 2 in stedy   Short term goal 3: trial transfers up to walker     Patient Goals   Patient goals : none formulated at this session       Therapy Time   Individual Concurrent Group Co-treatment   Time In       1100([id number])   Time Out       1125   Minutes       1405 Ray Posadas, PT

## 2020-07-09 NOTE — PROGRESS NOTES
Pt hasn't had a BM since 6/30. Attempted to give PRN Relistor, but pharmacist stated hospital currently doesn't have any more stock of this med. Sticky note left to MD to see if they would like to order something else besides PRN Miralax.

## 2020-07-09 NOTE — CARE COORDINATION
Pt will be discharged on Friday, 7/10/2020. Transport is set up for 12PM with [employer]. VM left for pts daughter - Rosetta Morse re: expected D/C for tomorrow.     PH# for Report to [facility] [phone]  Veronica Gutierrez [phone]  Electronically signed by Amanda Donahue on 7/9/2020 at 3:22 PM

## 2020-07-09 NOTE — PROGRESS NOTES
Pt resting in bed at beginning of shift. He reports some pain with movement, but denies having any nausea, numbness, or tingling. PRN Norco administered. Pulses palpable, skin warm to touch. He has weak flexion in RLE and moderate flexion in LLE. He is SR on telemetry. Fall precautions in place, belongings within reach. Will continue to monitor and assess.

## 2020-07-09 NOTE — PROGRESS NOTES
Pt sitting up in the chair. AAO x4. Per day shift nurse pt can be confused at times. No c/o pain at this time. Assessment completed and charted. Prineo intact to right hip with no drainage noted. Pedal pulses palpable. O2 sat 97% on 2.5 L/NC. Pt does not want to get back in bed yet. Denies needs. Call light in reach. Will monitor.

## 2020-07-10 VITALS
SYSTOLIC BLOOD PRESSURE: 129 MMHG | HEIGHT: 68 IN | WEIGHT: 145.72 LBS | OXYGEN SATURATION: 96 % | HEART RATE: 71 BPM | BODY MASS INDEX: 22.09 KG/M2 | DIASTOLIC BLOOD PRESSURE: 70 MMHG | RESPIRATION RATE: 16 BRPM | TEMPERATURE: 98 F

## 2020-07-10 LAB
ALBUMIN SERPL-MCNC: 2.5 G/DL (ref 3.4–5)
ANION GAP SERPL CALCULATED.3IONS-SCNC: 8 MMOL/L (ref 3–16)
BUN BLDV-MCNC: 36 MG/DL (ref 7–20)
CALCIUM SERPL-MCNC: 8.5 MG/DL (ref 8.3–10.6)
CHLORIDE BLD-SCNC: 107 MMOL/L (ref 99–110)
CO2: 31 MMOL/L (ref 21–32)
CREAT SERPL-MCNC: 1.6 MG/DL (ref 0.8–1.3)
GFR AFRICAN AMERICAN: 50
GFR NON-AFRICAN AMERICAN: 41
GLUCOSE BLD-MCNC: 97 MG/DL (ref 70–99)
HCT VFR BLD CALC: 27.8 % (ref 40.5–52.5)
HEMOGLOBIN: 8.9 G/DL (ref 13.5–17.5)
MCH RBC QN AUTO: 30.9 PG (ref 26–34)
MCHC RBC AUTO-ENTMCNC: 32 G/DL (ref 31–36)
MCV RBC AUTO: 96.8 FL (ref 80–100)
PDW BLD-RTO: 14.9 % (ref 12.4–15.4)
PHOSPHORUS: 2.6 MG/DL (ref 2.5–4.9)
PLATELET # BLD: 358 K/UL (ref 135–450)
PMV BLD AUTO: 7.6 FL (ref 5–10.5)
POTASSIUM SERPL-SCNC: 3.7 MMOL/L (ref 3.5–5.1)
RBC # BLD: 2.88 M/UL (ref 4.2–5.9)
SODIUM BLD-SCNC: 146 MMOL/L (ref 136–145)
WBC # BLD: 14.2 K/UL (ref 4–11)

## 2020-07-10 PROCEDURE — 6370000000 HC RX 637 (ALT 250 FOR IP): Performed by: FAMILY MEDICINE

## 2020-07-10 PROCEDURE — 6370000000 HC RX 637 (ALT 250 FOR IP): Performed by: PHYSICIAN ASSISTANT

## 2020-07-10 PROCEDURE — 99232 SBSQ HOSP IP/OBS MODERATE 35: CPT | Performed by: INTERNAL MEDICINE

## 2020-07-10 PROCEDURE — 36415 COLL VENOUS BLD VENIPUNCTURE: CPT

## 2020-07-10 PROCEDURE — 6360000002 HC RX W HCPCS: Performed by: PHYSICIAN ASSISTANT

## 2020-07-10 PROCEDURE — 2580000003 HC RX 258: Performed by: PHYSICIAN ASSISTANT

## 2020-07-10 PROCEDURE — 94761 N-INVAS EAR/PLS OXIMETRY MLT: CPT

## 2020-07-10 PROCEDURE — 94640 AIRWAY INHALATION TREATMENT: CPT

## 2020-07-10 PROCEDURE — 80069 RENAL FUNCTION PANEL: CPT

## 2020-07-10 PROCEDURE — 85027 COMPLETE CBC AUTOMATED: CPT

## 2020-07-10 PROCEDURE — 2700000000 HC OXYGEN THERAPY PER DAY

## 2020-07-10 RX ORDER — FUROSEMIDE 20 MG/1
20 TABLET ORAL DAILY
Qty: 60 TABLET | Refills: 0
Start: 2020-07-11

## 2020-07-10 RX ORDER — HYDROCODONE BITARTRATE AND ACETAMINOPHEN 5; 325 MG/1; MG/1
1 TABLET ORAL EVERY 4 HOURS PRN
Qty: 30 TABLET | Refills: 0 | Status: ON HOLD | OUTPATIENT
Start: 2020-07-10 | End: 2020-07-20 | Stop reason: HOSPADM

## 2020-07-10 RX ORDER — FUROSEMIDE 20 MG/1
20 TABLET ORAL DAILY
Status: DISCONTINUED | OUTPATIENT
Start: 2020-07-10 | End: 2020-07-10 | Stop reason: HOSPADM

## 2020-07-10 RX ORDER — IPRATROPIUM BROMIDE AND ALBUTEROL SULFATE 2.5; .5 MG/3ML; MG/3ML
1 SOLUTION RESPIRATORY (INHALATION) EVERY 4 HOURS PRN
Status: DISCONTINUED | OUTPATIENT
Start: 2020-07-10 | End: 2020-07-10 | Stop reason: HOSPADM

## 2020-07-10 RX ORDER — IPRATROPIUM BROMIDE AND ALBUTEROL SULFATE 2.5; .5 MG/3ML; MG/3ML
3 SOLUTION RESPIRATORY (INHALATION) EVERY 4 HOURS PRN
Qty: 360 ML | Refills: 0
Start: 2020-07-10

## 2020-07-10 RX ORDER — POLYETHYLENE GLYCOL 3350 17 G/17G
17 POWDER, FOR SOLUTION ORAL DAILY PRN
Qty: 527 G | Refills: 1
Start: 2020-07-10 | End: 2020-08-09

## 2020-07-10 RX ORDER — ASPIRIN 81 MG/1
81 TABLET ORAL 2 TIMES DAILY
Qty: 60 TABLET | Refills: 0 | Status: SHIPPED | OUTPATIENT
Start: 2020-07-10 | End: 2020-08-09

## 2020-07-10 RX ADMIN — SENNOSIDES 8.6 MG: 8.6 TABLET, FILM COATED ORAL at 08:08

## 2020-07-10 RX ADMIN — SODIUM CHLORIDE, PRESERVATIVE FREE 10 ML: 5 INJECTION INTRAVENOUS at 08:09

## 2020-07-10 RX ADMIN — TAMSULOSIN HYDROCHLORIDE 0.4 MG: 0.4 CAPSULE ORAL at 08:07

## 2020-07-10 RX ADMIN — POLYVINYL ALCOHOL 1 DROP: 14 SOLUTION/ DROPS OPHTHALMIC at 08:08

## 2020-07-10 RX ADMIN — GABAPENTIN 100 MG: 100 CAPSULE ORAL at 08:07

## 2020-07-10 RX ADMIN — HYDROCODONE BITARTRATE AND ACETAMINOPHEN 1 TABLET: 5; 325 TABLET ORAL at 08:08

## 2020-07-10 RX ADMIN — OYSTER SHELL CALCIUM WITH VITAMIN D 1 TABLET: 500; 200 TABLET, FILM COATED ORAL at 08:07

## 2020-07-10 RX ADMIN — ENOXAPARIN SODIUM 30 MG: 30 INJECTION SUBCUTANEOUS at 08:08

## 2020-07-10 RX ADMIN — AMIODARONE HYDROCHLORIDE 200 MG: 200 TABLET ORAL at 08:07

## 2020-07-10 RX ADMIN — METOPROLOL SUCCINATE 50 MG: 50 TABLET, EXTENDED RELEASE ORAL at 08:07

## 2020-07-10 RX ADMIN — HYDROCODONE BITARTRATE AND ACETAMINOPHEN 1 TABLET: 5; 325 TABLET ORAL at 13:00

## 2020-07-10 ASSESSMENT — PAIN DESCRIPTION - DESCRIPTORS
DESCRIPTORS: ACHING

## 2020-07-10 ASSESSMENT — PAIN DESCRIPTION - LOCATION
LOCATION: HIP

## 2020-07-10 ASSESSMENT — PAIN DESCRIPTION - ORIENTATION
ORIENTATION: RIGHT

## 2020-07-10 ASSESSMENT — PAIN DESCRIPTION - FREQUENCY
FREQUENCY: INTERMITTENT

## 2020-07-10 ASSESSMENT — PAIN SCALES - GENERAL
PAINLEVEL_OUTOF10: 5
PAINLEVEL_OUTOF10: 4
PAINLEVEL_OUTOF10: 0
PAINLEVEL_OUTOF10: 7
PAINLEVEL_OUTOF10: 5

## 2020-07-10 ASSESSMENT — PAIN DESCRIPTION - PROGRESSION
CLINICAL_PROGRESSION: NOT CHANGED

## 2020-07-10 ASSESSMENT — PAIN DESCRIPTION - PAIN TYPE
TYPE: SURGICAL PAIN

## 2020-07-10 ASSESSMENT — PAIN DESCRIPTION - ONSET
ONSET: ON-GOING

## 2020-07-10 ASSESSMENT — PAIN - FUNCTIONAL ASSESSMENT
PAIN_FUNCTIONAL_ASSESSMENT: PREVENTS OR INTERFERES WITH ALL ACTIVE AND SOME PASSIVE ACTIVITIES
PAIN_FUNCTIONAL_ASSESSMENT: PREVENTS OR INTERFERES SOME ACTIVE ACTIVITIES AND ADLS

## 2020-07-10 ASSESSMENT — PAIN SCALES - WONG BAKER: WONGBAKER_NUMERICALRESPONSE: 0

## 2020-07-10 NOTE — PLAN OF CARE
Problem: Skin Integrity:  Goal: Will show no infection signs and symptoms  Description: Will show no infection signs and symptoms  7/9/2020 2233 by Inez Wolf RN  Outcome: Ongoing  Note: Pt assessed for infection, No signs or symptoms of surgical site noted. VVS, WBC WNL. Reviewed information with pt and family, pt verbalized understanding     7/9/2020 1111 by Cecilia Lombard, RN  Outcome: Ongoing  Note: Pt with surgical incision to R hip. He also has scattered bruising and blanchable redness to sacrum, mepilex in place. Healing laceration to R eye. Will monitor for changes. Goal: Absence of new skin breakdown  Description: Absence of new skin breakdown  7/9/2020 2233 by Inez Wolf RN  Outcome: Ongoing  7/9/2020 1111 by Cecilia Lombard, RN  Outcome: Ongoing  Note: No new areas of skin breakdown noted. Will monitor for changes. Problem: Falls - Risk of:  Goal: Will remain free from falls  Description: Will remain free from falls  7/9/2020 2233 by Inez Wolf RN  Outcome: Ongoing  Note: Fall risk assessment completed . Fall precautions in place, bed/ chair alarm on, side rails 2/4 up, call light in reach, educated pt on calling for assistance when needed, room clear of clutter. Pt verbalized understanding. 7/9/2020 1111 by Cecilia Lombard, RN  Outcome: Ongoing  Note: Pt free from injury or falls at this time, fall precautions in place, bed in low position, side rail up x2, Carroll Fall Risk: High (45 and higher), bed alarm on, reoriented to room and call light, reminded not to get up without assistance, call light in reach, will continue to monitor. Pt verbalizes understanding of fall risk procedures. Problem: Pain:  Goal: Pain level will decrease  Description: Pain level will decrease  7/9/2020 2233 by Inez Wolf RN  Outcome: Ongoing  Note: Pain /discomfort being managed with PRN analgesics per MD orders.  Patient able to express presence and absence of pain and rate pain appropriately using numerical scale. 7/9/2020 1111 by Iesha Sumner RN  Outcome: Ongoing  Note: Pt reported pain in R hip with movement. PRN Norco administered with good results. Problem: OXYGENATION/RESPIRATORY FUNCTION  Goal: Patient will maintain patent airway  7/9/2020 2233 by Darryl Wright RN  Outcome: Ongoing  Note: Patient remains free of significant airway secretions. Patient able to effectively cough and clear any secretions that need cleared. Will continue to monitor patient throughout shift. 7/9/2020 1111 by Iesha Sumner RN  Outcome: Ongoing  Note: Patient has maintained a patent airway, repositions self, lung sounds bilaterally clear/diminished, no cough noted, RT is involved in patient care and is providing care, patient has had adequate fluid intake and is on a 2000 mL fluid restriction, no need to suction airway at this time, educated patient to turn and cough and deep breathe every two hours and as needed, encouraged incentive spirometer and patient has been performing. Will continue to monitor and reassess. Goal: Patient will achieve/maintain normal respiratory rate/effort  Description: Respiratory rate and effort will be within normal limits for the patient  7/9/2020 2233 by Darryl Wright RN  Outcome: Ongoing  7/9/2020 1111 by Iesha Sumner RN  Outcome: Ongoing  Note: Vital signs stable, respiratory rate normal, heart rate is WNLs and regular on cardiac monitor, +2 pulses and less than 3 second cap refill noted in all extremities, body color appropriate for ethnicity and temperature warm, edema noted to BLE, patient repositions  self, and daily weights are ordered and no change from yesterday's weight. Will continue to monitor and reassess. Problem: HEMODYNAMIC STATUS  Goal: Patient has stable vital signs and fluid balance  7/9/2020 2233 by Darryl Wright RN  Outcome: Ongoing  Note: Vitals signs are stable.   Intake and output are being recorded, will continue to monitor    7/9/2020 1111 by Jarek Bro RN  Outcome: Ongoing  Note: Vital signs stable, respiratory rate normal, heart rate is WNLs and regular on cardiac monitor, +2 pulses and less than 3 second cap refill noted in all extremities, body color appropriate for ethnicity and temperature warm, edema noted to BLE, patient repositions  self, and daily weights are ordered and no change from yesterday's weight. Will continue to monitor and reassess. Problem: FLUID AND ELECTROLYTE IMBALANCE  Goal: Fluid and electrolyte balance are achieved/maintained  7/9/2020 2233 by Hai Lisa RN  Outcome: Ongoing  Note: Vitals signs are stable. Intake and output are being recorded, will continue to monitor    7/9/2020 1111 by Jarek Bro RN  Outcome: Ongoing  Note: Pt's electrolytes are WDL this shift. Problem: ACTIVITY INTOLERANCE/IMPAIRED MOBILITY  Goal: Mobility/activity is maintained at optimum level for patient  7/9/2020 2233 by Hai Lisa RN  Outcome: Ongoing  Note: Early and frequent ambulqtion encouraged. Educated patient on importance of early ambulation. Patient assisted with ambulation. Will continue to monitor. 7/9/2020 1111 by Jarek Bro RN  Outcome: Ongoing  Note: Utilizing stedy x 2 for transfers from bed to chair, pt tolerating well. Problem: Nutrition  Goal: Optimal nutrition therapy  7/9/2020 2233 by Hai Lisa RN  Outcome: Ongoing  7/9/2020 1111 by Jarek Bro RN  Outcome: Ongoing  Note: Pt completing 26-50% of meal trays this shift.

## 2020-07-10 NOTE — DISCHARGE SUMMARY
Hospital Medicine Discharge Summary    Patient ID: Cristal Goldberg      Patient's PCP: Anaid Spears MD    Admit Date: 7/1/2020     Discharge Date:   7/10/2020     Admitting Physician: Idalia Sidhu MD     Discharge Physician: Mikel Montero MD     Discharge Diagnoses: Active Hospital Problems    Diagnosis    Acute respiratory failure with hypoxia ([facility] [address] [J96.01]    COVID-19 [U07.1]    Closed fracture of right hip ([address] [S72.001A]       The patient was seen and examined on day of discharge and this discharge summary is in conjunction with any daily progress note from day of discharge. Hospital Course:   Cristal Goldberg is 80 y.o. with PMH diastolic heart failure, CKD stage III, PAF, recent COVID 23 infection who was admitted with right hip fracture status post fall. He was in acute decompensated diastolic heart failure on admission and cardiology was consulted for cardiac clearance. He was diuresed with IV Lasix and then underwent ORIF for his right hip. On IV Lasix he went into a mild ARIANE. Lasix was held and his creatinine improved. Nephrology was consulted and recommends outpatient follow-up with them. We will resume Lasix on Sunday, July 12, 2020. Recommend outpatient BMP in 5 days. Patient had acute respiratory failure with hypoxia secondary to heart failure he did complete a course of antibiotics. He will be discharged on 2.5 L nasal cannula with hopes to wean once patient is mobile. He is stable for discharge to [facility]. He will follow-up with orthopedics and nephrology at discharge. Physical Exam Performed:     /70   Pulse 71   Temp 98 °F (36.7 °C) (Oral)   Resp 16   Ht 5' 8\" (1.727 m)   Wt 145 lb 11.6 oz (66.1 kg)   SpO2 96%   BMI 22.16 kg/m²       General appearance:  No apparent distress, alert and cooperative. HEENT:  Conjunctivae/corneas clear. Neck supple with full ROM. Respiratory:  Normal respiratory effort.  Clear to auscultation, bilaterally without Rales/Wheezes/Rhonchi. Cardiovascular:  Regular rate and rhythm, normal S1/S2, no murmurs  Abdomen: Soft, non-tender, non-distended with normal bowel sounds. Musculoskeletal:  No edema bilaterally, incision c/d/i  Neurologic:   grossly non-focal.  Psychiatric:  Alert and oriented, normal insight  Peripheral Pulses: +2 palpable, equal bilaterally       Labs: For convenience and continuity at follow-up the following most recent labs are provided:      CBC:    Lab Results   Component Value Date    WBC 14.2 07/10/2020    HGB 8.9 07/10/2020    HCT 27.8 07/10/2020     07/10/2020       Renal:    Lab Results   Component Value Date     07/10/2020    K 3.7 07/10/2020    K 4.8 07/02/2020     07/10/2020    CO2 31 07/10/2020    BUN 36 07/10/2020    CREATININE 1.6 07/10/2020    CALCIUM 8.5 07/10/2020    PHOS 2.6 07/10/2020         Significant Diagnostic Studies    Radiology:   XR ABDOMEN (KUB) (SINGLE AP VIEW)   Final Result   No significantly dilated bowel or evidence of obstruction. XR HIP 2-3 VW W PELVIS RIGHT   Final Result   1. Status post right hip arthroplasty   2. Large groin hernia containing bowel         XR CHEST PORTABLE   Final Result   Stable appearance of presumed pulmonary edema with bilateral pleural effusions         XR CHEST 1 VW   Final Result   Moderate to large right-sided and small to moderate left pleural effusion,   mildly increased over the past 24 hours. Increased pulmonary edema. Focal new area of left perihilar atelectasis versus pneumonia. Increased bibasilar atelectasis         CT CHEST WO CONTRAST   Final Result   Moderate bilateral pleural effusions and bilateral airspace disease with   superimposed atelectasis or pneumonia at the lung bases. Scattered areas of   septal thickening are seen, compatible with a component of fluid   overload-pulmonary edema         NM LUNG SCAN PERFUSION ONLY   Final Result   Intermediate probability for pulmonary embolism.

## 2020-07-10 NOTE — PROGRESS NOTES
[facility] Orthopedic Surgery   Progress Note      S/P :  SUBJECTIVE  In bed. Alert and cooperative. States just tired today. Pain is not  described in right hip at this time. OBJECTIVE              Physical                      VITALS:  BP (!) 151/69   Pulse 77   Temp 97.7 °F (36.5 °C) (Oral)   Resp 16   Ht 5' 8\" (1.727 m)   Wt 145 lb 11.6 oz (66.1 kg)   SpO2 94%   BMI 22.16 kg/m²                     MUSCULOSKELETAL:  right foot NVI. Wiggles toes to command. Pedal pulses are palpable. NEUROLOGIC:                                  Sensory:  Touch:  Right Lower Extremity:  normal                                                 Surgical wound appears clean and dry right hip with Prineo dressing. Ice pack applied.      Data       CBC:   Lab Results   Component Value Date    WBC 14.2 07/10/2020    RBC 2.88 07/10/2020    HGB 8.9 07/10/2020    HCT 27.8 07/10/2020    MCV 96.8 07/10/2020    MCH 30.9 07/10/2020    MCHC 32.0 07/10/2020    RDW 14.9 07/10/2020     07/10/2020    MPV 7.6 07/10/2020        WBC:    Lab Results   Component Value Date    WBC 14.2 07/10/2020        Hemoglobin/Hematocrit:    Lab Results   Component Value Date    HGB 8.9 07/10/2020    HCT 27.8 07/10/2020        PT/INR:    Lab Results   Component Value Date    PROTIME 13.1 06/17/2018    INR 1.15 06/17/2018              Current Inpatient Medications             Current Facility-Administered Medications: furosemide (LASIX) tablet 20 mg, 20 mg, Oral, Daily  ipratropium-albuterol (DUONEB) nebulizer solution 1 ampule, 1 ampule, Inhalation, Q4H PRN  polyethylene glycol (GLYCOLAX) packet 17 g, 17 g, Oral, BID  senna (SENOKOT) tablet 8.6 mg, 1 tablet, Oral, BID  methylnaltrexone (RELISTOR) injection 12 mg, 12 mg, Subcutaneous, Q12H PRN  sodium chloride flush 0.9 % injection 10 mL, 10 mL, Intravenous, 2 times per day  sodium chloride flush 0.9 % injection 10 mL, 10 mL, Intravenous, PRN  HYDROmorphone (DILAUDID) injection 0.25 mg, 0.25 mg, Intravenous, Q3H PRN **OR** HYDROmorphone (DILAUDID) injection 0.5 mg, 0.5 mg, Intravenous, Q3H PRN  HYDROcodone-acetaminophen (NORCO) 5-325 MG per tablet 1 tablet, 1 tablet, Oral, Q4H PRN  enoxaparin (LOVENOX) injection 30 mg, 30 mg, Subcutaneous, Daily  amiodarone (CORDARONE) tablet 200 mg, 200 mg, Oral, Daily  amitriptyline (ELAVIL) tablet 12.5 mg, 12.5 mg, Oral, Nightly  calcium-vitamin D 500-200 MG-UNIT per tablet 1 tablet, 1 tablet, Oral, Daily with breakfast  divalproex (DEPAKOTE SPRINKLE) capsule 250 mg, 250 mg, Oral, Nightly  gabapentin (NEURONTIN) capsule 100 mg, 100 mg, Oral, BID  polyvinyl alcohol (LIQUIFILM TEARS) 1.4 % ophthalmic solution 1 drop, 1 drop, Both Eyes, BID  melatonin tablet 4.5 mg, 4.5 mg, Oral, Nightly  metoprolol succinate (TOPROL XL) extended release tablet 50 mg, 50 mg, Oral, Daily  tamsulosin (FLOMAX) capsule 0.4 mg, 0.4 mg, Oral, Daily  sodium chloride flush 0.9 % injection 10 mL, 10 mL, Intravenous, 2 times per day  sodium chloride flush 0.9 % injection 10 mL, 10 mL, Intravenous, PRN  acetaminophen (TYLENOL) tablet 650 mg, 650 mg, Oral, Q6H PRN **OR** acetaminophen (TYLENOL) suppository 650 mg, 650 mg, Rectal, Q6H PRN  promethazine (PHENERGAN) tablet 12.5 mg, 12.5 mg, Oral, Q6H PRN **OR** ondansetron (ZOFRAN) injection 4 mg, 4 mg, Intravenous, Q6H PRN    ASSESSMENT AND PLAN    Fall  Right hip fx, post hemiarthroplasty, stable exam  DVT prophylaxis ordered, on lovenox inpatient, Aspirin 81 mg BID X30 days after discharge  PT OT for ADL's and ambulation as tolerated, posterior hip precautions  SS for DC planning, ECF  IV or PO pain med as ordered  Stable for DC per fanny [facility]  7/10/2020  9:54 AM

## 2020-07-10 NOTE — PROGRESS NOTES
Pulmonary Progress Note    Date of Admission: 7/1/2020   LOS: 9 days       CC:  Severe hypoxemia    Subjective:  Shortness of breath continues to improve. 2.5 L nasal cannula. ROS:   No chest pain  No nausea      PHYSICAL EXAM:   Blood pressure (!) 151/69, pulse 77, temperature 97.7 °F (36.5 °C), temperature source Oral, resp. rate 16, height 5' 8\" (1.727 m), weight 145 lb 11.6 oz (66.1 kg), SpO2 91 %.'  Gen: No distress. ENT:   Resp: No accessory muscle use. No crackles. No wheezes. No rhonchi. CV: Regular rate. Regular rhythm. No murmur or rub. No edema. Skin: Warm, dry, normal texture and turgor. No nodule on exposed extremities. M/S: No cyanosis. No clubbing. No joint deformity. Psych: Awake.       Medications:    Scheduled Meds:   furosemide  20 mg Oral Daily    polyethylene glycol  17 g Oral BID    senna  1 tablet Oral BID    sodium chloride flush  10 mL Intravenous 2 times per day    enoxaparin  30 mg Subcutaneous Daily    ipratropium-albuterol  1 ampule Inhalation Q6H WA    amiodarone  200 mg Oral Daily    amitriptyline  12.5 mg Oral Nightly    calcium-vitamin D  1 tablet Oral Daily with breakfast    divalproex  250 mg Oral Nightly    gabapentin  100 mg Oral BID    polyvinyl alcohol  1 drop Both Eyes BID    melatonin  4.5 mg Oral Nightly    metoprolol succinate  50 mg Oral Daily    tamsulosin  0.4 mg Oral Daily    sodium chloride flush  10 mL Intravenous 2 times per day       Continuous Infusions:      PRN Meds:  methylnaltrexone, sodium chloride flush, HYDROmorphone **OR** HYDROmorphone, HYDROcodone 5 mg - acetaminophen, sodium chloride flush, acetaminophen **OR** acetaminophen, promethazine **OR** ondansetron    Labs reviewed:  CBC:   Recent Labs     07/09/20  0436 07/10/20  0421   WBC  --  14.2*   HGB 8.9* 8.9*   HCT 27.8* 27.8*   MCV  --  96.8   PLT  --  358     BMP:   Recent Labs     07/08/20  0604 07/09/20 0436 07/10/20  0421    145 146*   K 3.9 3.5 3.7    105 107   CO2 27 27 31   PHOS 4.2 2.8 2.6   BUN 38* 38* 36*   CREATININE 2.0* 1.8* 1.6*     LIVER PROFILE:   No results for input(s): AST, ALT, LIPASE, BILIDIR, BILITOT, ALKPHOS in the last 72 hours. Invalid input(s): AMYLASE,  ALB  PT/INR: No results for input(s): PROTIME, INR in the last 72 hours. APTT: No results for input(s): APTT in the last 72 hours. UA:  No results for input(s): NITRITE, COLORU, PHUR, LABCAST, WBCUA, RBCUA, MUCUS, TRICHOMONAS, YEAST, BACTERIA, CLARITYU, SPECGRAV, LEUKOCYTESUR, UROBILINOGEN, BILIRUBINUR, BLOODU, GLUCOSEU, AMORPHOUS in the last 72 hours. Invalid input(s): Fritz Ramal  No results for input(s): PH, PCO2, PO2 in the last 72 hours. Cx:      Films:      Assessment:     Acute hypoxemic respiratory failure with saturations less than 90% on room air  History of COVID-19  Hip fracture  Acute kidney injury         Plan:      Abnormal radiograph with acute hypoxemic respiratory failure  -Okay to discharge on 2.5 L nasal cannula. -Restart home dose of Lasix, oral 20 mg daily.  -Completed 7 days of antibiotics today.  -Change duo nebs to as needed    Acute kidney injury     - continues to improve. Restart home dose of Lasix. Hip fracture  -Postop day     Discussed with nurse. Planning on discharge today. Agree with discharge. Oxygen can be weaned with rehab. This note was transcribed using [id number] [employer]. Please disregard any translational errors.       Matt Ortega [phone]

## 2020-07-10 NOTE — PLAN OF CARE
Problem: Skin Integrity:  Goal: Will show no infection signs and symptoms  Description: Will show no infection signs and symptoms  7/10/2020 1058 by Malia Flores RN  Outcome: Ongoing  Note: Viet assessed. Skin assessed. Pt turned/repositioned q2h to prevent skin breakdown. Surgical incision with glue, assessed and clean, dry, intact. Labs monitored. No s/s of infection. Problem: Falls - Risk of:  Goal: Will remain free from falls  Description: Will remain free from falls  7/10/2020 1058 by Malia Flores RN  Outcome: Ongoing  Note: Fall risk assessment completed. Fall precautions in place. Call light within reach. Pt educated on calling for assistance before getting up. Walkway free of clutter. Will continue to monitor. Problem: Pain:  Goal: Pain level will decrease  Description: Pain level will decrease  7/10/2020 1058 by Malia Flores RN  Outcome: Ongoing  Note: Pt assessed for pain. Pt in pain and assessed with 0-10 pain rating scale. Pt given prescribed analgesic for pain. (See eMar) Pt satisfied with pain relief thus far. Will reassess and continue to monitor. Problem: OXYGENATION/RESPIRATORY FUNCTION  Goal: Patient will maintain patent airway  7/10/2020 1058 by Malia Flores RN  Outcome: Ongoing  Note: Pt able to maintain patent airway. Lungs assessed. Respirations easy and unlabored. Dyspnea with exertion noted. Pt remains on 2.5 L O2 sating above 90%. Medications administered as directed. Problem: HEMODYNAMIC STATUS  Goal: Patient has stable vital signs and fluid balance  7/10/2020 1058 by Malia Flores RN  Outcome: Ongoing  Note: VSS and WDL. Pt on 2.5 L O2 sating above 90%. I&O monitored. Labs monitored. Vitals monitored per floor orders. Problem: FLUID AND ELECTROLYTE IMBALANCE  Goal: Fluid and electrolyte balance are achieved/maintained  7/10/2020 1058 by Malia Flores RN  Outcome: Ongoing  Note: I&O monitored.  Medications administered as directed. Electrolytes monitored and replaced PRN. Labs monitored.

## 2020-07-10 NOTE — CONSULTS
Office: [phone]       Fax: [phone]      Nephrology Initial Consult Note        Patient's Name: Aditya Maciel Date: 7/1/2020  Date of Visit: 7/10/2020    Reason for Consult: ARIANE  Requesting Physician:  Denice Deras MD  PCP: Akash Troy MD    Chief Complaint:  Fall      History of Present Illness:      Donovan Barnett is a 80 y.o. male with PMHx of hypertension, osteoarthritis, PAF, ? CKD, alcohol abuse in past  who was hospitalized on 7/1/2020 after fall. Pt is a resident of [facility] facility. Patient was noted to have right hip fracture. Underwent right hemiarthroplasty on 7/6. Since admission, his creatinine has fluctuated. Had evidence of fluid overload, (pulm congestion, high BNP). It was noted that he was started on lasix shortly before admission for edema. history of CKD mentioned in NH records. history of COVID +  No history of renal stone, hematuria. NSAID use: none  IV contrast: none    Spoke to son and daughter-in-law over phone    Past Medical History:   Diagnosis Date    Alcohol abuse     Arthritis of left knee     advanced    Atrial fibrillation (HCC)     HTN (hypertension)     Hyperlipidemia     PAF (paroxysmal atrial fibrillation) (HCC)        Past Surgical History:   Procedure Laterality Date    HIP FRACTURE SURGERY Left     HIP SURGERY Right 7/6/2020    RIGHT HIP HEMIARTHROPLASTY performed by Ana Beltran MD at [address] [place] [address] History   Problem Relation Age of Onset    Other Mother     Other Father     Heart Disease Father         reports that he has never smoked. He has never used smokeless tobacco. He reports current alcohol use. He reports that he does not use drugs. Medications: Allergies:  Patient has no known allergies.     Scheduled Meds:   furosemide  20 mg Oral Daily    polyethylene glycol bases  Abdomen:  +BS, soft, NT, ND  Ext: trace lower extremity edema  Psychiatric: mood and affect flat  Skin: dry/intact  CNS: hard of hearing, not fully oriented, no agitation    IMAGING:  XR ABDOMEN (KUB) (SINGLE AP VIEW)   Final Result   No significantly dilated bowel or evidence of obstruction. XR HIP 2-3 VW W PELVIS RIGHT   Final Result   1. Status post right hip arthroplasty   2. Large groin hernia containing bowel         XR CHEST PORTABLE   Final Result   Stable appearance of presumed pulmonary edema with bilateral pleural effusions         XR CHEST 1 VW   Final Result   Moderate to large right-sided and small to moderate left pleural effusion,   mildly increased over the past 24 hours. Increased pulmonary edema. Focal new area of left perihilar atelectasis versus pneumonia. Increased bibasilar atelectasis         CT CHEST WO CONTRAST   Final Result   Moderate bilateral pleural effusions and bilateral airspace disease with   superimposed atelectasis or pneumonia at the lung bases. Scattered areas of   septal thickening are seen, compatible with a component of fluid   overload-pulmonary edema         NM LUNG SCAN PERFUSION ONLY   Final Result   Intermediate probability for pulmonary embolism. XR LUMBAR SPINE (2-3 VIEWS)   Final Result   Limited portable exam demonstrating no definite acute fracture         XR CHEST 1 VW   Final Result   Pulmonary vascular congestion. Suspect right pleural effusion. Right   perihilar ground-glass opacity may represent asymmetric edema or pneumonia         CT Head WO Contrast   Final Result   No acute intracranial abnormality. Diffuse atrophic changes with findings suggesting chronic microvascular   ischemia         CT CERVICAL SPINE WO CONTRAST   Final Result   No acute abnormality of the cervical spine.          XR HIP RIGHT (2-3 VIEWS)   Final Result   Subcapital right femoral neck fracture, may be pathologic as there are   possible destructive bony lesions within the right femoral neck             Assessment :     1. ARIANE on suspected CKD  -Non-Oliguric  -Baseline creat: [facility] 1.4-2  -UA prot-, bld -  -Volume: Hypervolemic   -Electrolytes: Hypernatremia and No Dyskalemia  -Acid-Base: Metabolic alkalosis No ABG  -ARIANE in the setting of CHF, diuresis    Recent Labs     07/08/20  0604 07/09/20  0436 07/10/20  0421   BUN 38* 38* 36*   CREATININE 2.0* 1.8* 1.6*     Recent Labs     07/08/20  0604 07/09/20  0436 07/10/20  0421    145 146*   K 3.9 3.5 3.7   CO2 27 27 31       2. HTN  -Blood pressure high    BP Readings from Last 1 Encounters:   07/10/20 (!) 151/69       3. Anemia  -suspected blood loss    4. HFpEF  -Has PAF  -TTE (July 2020): LVEF 60-65%, G3DD, LVH    5. Right Hip fracture   -.status post right hip hemiarthroplasty 7/6    Plan:     - Hold lasix today, resume 20 mg/d in next 1-2 days  - avoid ACEI/ARB  - low salt diet  - monitor lytes. BMP 1 week  - Okay to discharge (will follow up labs, weights, BP at care center)    -Monitor I/O, UOP  -Maintain MAP>65  -Avoid nephrotoxin, if able. -Dose meds to current eGFR    Spoke to family and Toma Guillen MD     Thank you for allowing us to participate in care of David Owen . We will continue to follow. Feel free to contact me with any questions.       Pat Villafuerte  7/10/2020    [facility]  Office : [phone]  Fax :[phone]

## 2020-07-10 NOTE — PROGRESS NOTES
Data- discharge order received, pt and family verbalized agreement to discharge, disposition to [facility]. Action- Report called to Christiano Rafa around 1:31pm, all questions answered, discharge instructions prepared paperwork packet given to Simin Gallo, pt and family verbalized understanding and agreement to dc. Discharge instruction summary: Diet- general, 2L FR, Activity- WBAT, Primary Care Physician as follows: Serenity Champagne MD None f/u appointment PRN, f/u appt to be scheduled in 2 weeks with Dr Florina Trujillo, f/u with Dr Percival Barthel in 1 week for lab work, immunizations reviewed and up to date, prescription medications placed in paperwork packet for use at SNF. Inpatient surgical procedure precautions reviewed: WBAT. CHF Education reviewed. Pt/ Family has had a total of 60 minutes CHF education this admission encounter. Response- Pt belongings gathered, IV removed. Disposition is [facility], transported with first care via stretcher, no complications.      Electronically signed by Arcadio Apgar, RN on 7/10/2020 at 2:21 PM

## 2020-07-10 NOTE — CONSULTS
Consult received  Full note to follow    Jc Nunez  7/10/2020    [facility] [address]  Office : [phone]  Fax :[phone]

## 2020-07-10 NOTE — CARE COORDINATION
Transportation has been pushed to 2pm with [employer]. Nurse is aware.     PH# for Report to [facility],  [phone]  Jody Buchanan [phone]  Electronically signed by Jenny Velez on 7/10/2020 at 11:47 AM

## 2020-07-12 ENCOUNTER — APPOINTMENT (OUTPATIENT)
Dept: CT IMAGING | Age: 85
DRG: 871 | End: 2020-07-12
Payer: MEDICARE

## 2020-07-12 ENCOUNTER — HOSPITAL ENCOUNTER (INPATIENT)
Age: 85
LOS: 9 days | Discharge: NURSING, REHAB OR LONG-TERM CARE | DRG: 871 | End: 2020-07-21
Attending: EMERGENCY MEDICINE | Admitting: PEDIATRICS
Payer: MEDICARE

## 2020-07-12 ENCOUNTER — APPOINTMENT (OUTPATIENT)
Dept: GENERAL RADIOLOGY | Age: 85
DRG: 871 | End: 2020-07-12
Payer: MEDICARE

## 2020-07-12 LAB
A/G RATIO: 0.8 (ref 1.1–2.2)
ALBUMIN SERPL-MCNC: 2.8 G/DL (ref 3.4–5)
ALP BLD-CCNC: 65 U/L (ref 40–129)
ALT SERPL-CCNC: 7 U/L (ref 10–40)
ANION GAP SERPL CALCULATED.3IONS-SCNC: 12 MMOL/L (ref 3–16)
APTT: 30.3 SEC (ref 24.2–36.2)
AST SERPL-CCNC: 13 U/L (ref 15–37)
BASE EXCESS ARTERIAL: 6.2 MMOL/L (ref -3–3)
BASOPHILS ABSOLUTE: 0.1 K/UL (ref 0–0.2)
BASOPHILS RELATIVE PERCENT: 0.4 %
BILIRUB SERPL-MCNC: 0.5 MG/DL (ref 0–1)
BUN BLDV-MCNC: 28 MG/DL (ref 7–20)
CALCIUM SERPL-MCNC: 8.6 MG/DL (ref 8.3–10.6)
CARBOXYHEMOGLOBIN ARTERIAL: 1.3 % (ref 0–1.5)
CHLORIDE BLD-SCNC: 108 MMOL/L (ref 99–110)
CO2: 28 MMOL/L (ref 21–32)
CREAT SERPL-MCNC: 1.5 MG/DL (ref 0.8–1.3)
EOSINOPHILS ABSOLUTE: 0.3 K/UL (ref 0–0.6)
EOSINOPHILS RELATIVE PERCENT: 1.4 %
GFR AFRICAN AMERICAN: 53
GFR NON-AFRICAN AMERICAN: 44
GLOBULIN: 3.3 G/DL
GLUCOSE BLD-MCNC: 115 MG/DL (ref 70–99)
HCO3 ARTERIAL: 30.4 MMOL/L (ref 21–29)
HCT VFR BLD CALC: 31.7 % (ref 40.5–52.5)
HEMOGLOBIN, ART, EXTENDED: 9.5 G/DL (ref 13.5–17.5)
HEMOGLOBIN: 10 G/DL (ref 13.5–17.5)
INR BLD: 1.18 (ref 0.86–1.14)
LACTIC ACID: 1.6 MMOL/L (ref 0.4–2)
LYMPHOCYTES ABSOLUTE: 0.7 K/UL (ref 1–5.1)
LYMPHOCYTES RELATIVE PERCENT: 3.8 %
MCH RBC QN AUTO: 30.4 PG (ref 26–34)
MCHC RBC AUTO-ENTMCNC: 31.5 G/DL (ref 31–36)
MCV RBC AUTO: 96.5 FL (ref 80–100)
METHEMOGLOBIN ARTERIAL: 0.3 %
MONOCYTES ABSOLUTE: 1.1 K/UL (ref 0–1.3)
MONOCYTES RELATIVE PERCENT: 5.5 %
NEUTROPHILS ABSOLUTE: 17.2 K/UL (ref 1.7–7.7)
NEUTROPHILS RELATIVE PERCENT: 88.9 %
O2 CONTENT ARTERIAL: 13 ML/DL
O2 SAT, ARTERIAL: 97.6 %
O2 THERAPY: ABNORMAL
PCO2 ARTERIAL: 41.1 MMHG (ref 35–45)
PDW BLD-RTO: 15.2 % (ref 12.4–15.4)
PH ARTERIAL: 7.48 (ref 7.35–7.45)
PLATELET # BLD: 414 K/UL (ref 135–450)
PMV BLD AUTO: 7.5 FL (ref 5–10.5)
PO2 ARTERIAL: 90.9 MMHG (ref 75–108)
POTASSIUM SERPL-SCNC: 4.3 MMOL/L (ref 3.5–5.1)
PRO-BNP: 6537 PG/ML (ref 0–449)
PROTHROMBIN TIME: 13.7 SEC (ref 10–13.2)
RBC # BLD: 3.28 M/UL (ref 4.2–5.9)
SARS-COV-2, NAAT: NOT DETECTED
SODIUM BLD-SCNC: 148 MMOL/L (ref 136–145)
TCO2 ARTERIAL: 31.6 MMOL/L
TOTAL PROTEIN: 6.1 G/DL (ref 6.4–8.2)
TROPONIN: 0.04 NG/ML
WBC # BLD: 19.4 K/UL (ref 4–11)

## 2020-07-12 PROCEDURE — 99285 EMERGENCY DEPT VISIT HI MDM: CPT

## 2020-07-12 PROCEDURE — 70450 CT HEAD/BRAIN W/O DYE: CPT

## 2020-07-12 PROCEDURE — 83605 ASSAY OF LACTIC ACID: CPT

## 2020-07-12 PROCEDURE — 6360000002 HC RX W HCPCS: Performed by: EMERGENCY MEDICINE

## 2020-07-12 PROCEDURE — 82803 BLOOD GASES ANY COMBINATION: CPT

## 2020-07-12 PROCEDURE — 6360000004 HC RX CONTRAST MEDICATION: Performed by: EMERGENCY MEDICINE

## 2020-07-12 PROCEDURE — 80053 COMPREHEN METABOLIC PANEL: CPT

## 2020-07-12 PROCEDURE — 87040 BLOOD CULTURE FOR BACTERIA: CPT

## 2020-07-12 PROCEDURE — 83880 ASSAY OF NATRIURETIC PEPTIDE: CPT

## 2020-07-12 PROCEDURE — 36600 WITHDRAWAL OF ARTERIAL BLOOD: CPT

## 2020-07-12 PROCEDURE — 93005 ELECTROCARDIOGRAM TRACING: CPT | Performed by: EMERGENCY MEDICINE

## 2020-07-12 PROCEDURE — 96365 THER/PROPH/DIAG IV INF INIT: CPT

## 2020-07-12 PROCEDURE — 94660 CPAP INITIATION&MGMT: CPT

## 2020-07-12 PROCEDURE — 1200000000 HC SEMI PRIVATE

## 2020-07-12 PROCEDURE — 84484 ASSAY OF TROPONIN QUANT: CPT

## 2020-07-12 PROCEDURE — 6370000000 HC RX 637 (ALT 250 FOR IP): Performed by: EMERGENCY MEDICINE

## 2020-07-12 PROCEDURE — 85730 THROMBOPLASTIN TIME PARTIAL: CPT

## 2020-07-12 PROCEDURE — 71045 X-RAY EXAM CHEST 1 VIEW: CPT

## 2020-07-12 PROCEDURE — 36415 COLL VENOUS BLD VENIPUNCTURE: CPT

## 2020-07-12 PROCEDURE — 71260 CT THORAX DX C+: CPT

## 2020-07-12 PROCEDURE — 85025 COMPLETE CBC W/AUTO DIFF WBC: CPT

## 2020-07-12 PROCEDURE — U0002 COVID-19 LAB TEST NON-CDC: HCPCS

## 2020-07-12 PROCEDURE — 2580000003 HC RX 258: Performed by: EMERGENCY MEDICINE

## 2020-07-12 PROCEDURE — 85610 PROTHROMBIN TIME: CPT

## 2020-07-12 PROCEDURE — 96375 TX/PRO/DX INJ NEW DRUG ADDON: CPT

## 2020-07-12 RX ORDER — NALOXONE HYDROCHLORIDE 0.4 MG/ML
0.4 INJECTION, SOLUTION INTRAMUSCULAR; INTRAVENOUS; SUBCUTANEOUS ONCE
Status: COMPLETED | OUTPATIENT
Start: 2020-07-12 | End: 2020-07-12

## 2020-07-12 RX ORDER — ACETAMINOPHEN 650 MG/1
650 SUPPOSITORY RECTAL ONCE
Status: COMPLETED | OUTPATIENT
Start: 2020-07-12 | End: 2020-07-12

## 2020-07-12 RX ADMIN — PIPERACILLIN SODIUM AND TAZOBACTAM SODIUM 3.38 G: 3; .375 INJECTION, POWDER, FOR SOLUTION INTRAVENOUS at 22:35

## 2020-07-12 RX ADMIN — IOPAMIDOL 75 ML: 755 INJECTION, SOLUTION INTRAVENOUS at 22:32

## 2020-07-12 RX ADMIN — ACETAMINOPHEN 650 MG: 650 SUPPOSITORY RECTAL at 22:35

## 2020-07-12 RX ADMIN — NALOXONE HYDROCHLORIDE 0.4 MG: 0.4 INJECTION, SOLUTION INTRAMUSCULAR; INTRAVENOUS; SUBCUTANEOUS at 21:27

## 2020-07-12 ASSESSMENT — PAIN SCALES - GENERAL: PAINLEVEL_OUTOF10: 0

## 2020-07-13 LAB
EKG ATRIAL RATE: 91 BPM
EKG DIAGNOSIS: NORMAL
EKG Q-T INTERVAL: 436 MS
EKG QRS DURATION: 98 MS
EKG QTC CALCULATION (BAZETT): 542 MS
EKG R AXIS: 0 DEGREES
EKG T AXIS: 59 DEGREES
EKG VENTRICULAR RATE: 93 BPM
PROCALCITONIN: 2.81 NG/ML (ref 0–0.15)
REASON FOR REJECTION: NORMAL
REJECTED TEST: NORMAL
VANCOMYCIN RANDOM: 9.6 UG/ML

## 2020-07-13 PROCEDURE — U0003 INFECTIOUS AGENT DETECTION BY NUCLEIC ACID (DNA OR RNA); SEVERE ACUTE RESPIRATORY SYNDROME CORONAVIRUS 2 (SARS-COV-2) (CORONAVIRUS DISEASE [COVID-19]), AMPLIFIED PROBE TECHNIQUE, MAKING USE OF HIGH THROUGHPUT TECHNOLOGIES AS DESCRIBED BY CMS-2020-01-R: HCPCS

## 2020-07-13 PROCEDURE — 6360000002 HC RX W HCPCS: Performed by: PEDIATRICS

## 2020-07-13 PROCEDURE — 6360000002 HC RX W HCPCS: Performed by: INTERNAL MEDICINE

## 2020-07-13 PROCEDURE — 80202 ASSAY OF VANCOMYCIN: CPT

## 2020-07-13 PROCEDURE — 87081 CULTURE SCREEN ONLY: CPT

## 2020-07-13 PROCEDURE — 99223 1ST HOSP IP/OBS HIGH 75: CPT | Performed by: INTERNAL MEDICINE

## 2020-07-13 PROCEDURE — 6370000000 HC RX 637 (ALT 250 FOR IP): Performed by: PEDIATRICS

## 2020-07-13 PROCEDURE — 2580000003 HC RX 258: Performed by: INTERNAL MEDICINE

## 2020-07-13 PROCEDURE — 2580000003 HC RX 258: Performed by: PEDIATRICS

## 2020-07-13 PROCEDURE — U0002 COVID-19 LAB TEST NON-CDC: HCPCS

## 2020-07-13 PROCEDURE — 84145 PROCALCITONIN (PCT): CPT

## 2020-07-13 PROCEDURE — 93010 ELECTROCARDIOGRAM REPORT: CPT | Performed by: INTERNAL MEDICINE

## 2020-07-13 PROCEDURE — 94761 N-INVAS EAR/PLS OXIMETRY MLT: CPT

## 2020-07-13 PROCEDURE — 36415 COLL VENOUS BLD VENIPUNCTURE: CPT

## 2020-07-13 PROCEDURE — 2700000000 HC OXYGEN THERAPY PER DAY

## 2020-07-13 PROCEDURE — 2060000000 HC ICU INTERMEDIATE R&B

## 2020-07-13 RX ORDER — DEXAMETHASONE SODIUM PHOSPHATE 4 MG/ML
10 INJECTION, SOLUTION INTRA-ARTICULAR; INTRALESIONAL; INTRAMUSCULAR; INTRAVENOUS; SOFT TISSUE DAILY
Status: DISCONTINUED | OUTPATIENT
Start: 2020-07-13 | End: 2020-07-14

## 2020-07-13 RX ORDER — TAMSULOSIN HYDROCHLORIDE 0.4 MG/1
0.4 CAPSULE ORAL DAILY
Status: DISCONTINUED | OUTPATIENT
Start: 2020-07-13 | End: 2020-07-21 | Stop reason: HOSPADM

## 2020-07-13 RX ORDER — DEXAMETHASONE SODIUM PHOSPHATE 4 MG/ML
10 INJECTION, SOLUTION INTRA-ARTICULAR; INTRALESIONAL; INTRAMUSCULAR; INTRAVENOUS; SOFT TISSUE EVERY 6 HOURS
Status: DISCONTINUED | OUTPATIENT
Start: 2020-07-13 | End: 2020-07-13 | Stop reason: DRUGHIGH

## 2020-07-13 RX ORDER — ASPIRIN 81 MG/1
81 TABLET ORAL 2 TIMES DAILY
Status: DISCONTINUED | OUTPATIENT
Start: 2020-07-13 | End: 2020-07-20 | Stop reason: SDUPTHER

## 2020-07-13 RX ORDER — ONDANSETRON 2 MG/ML
4 INJECTION INTRAMUSCULAR; INTRAVENOUS EVERY 6 HOURS PRN
Status: DISCONTINUED | OUTPATIENT
Start: 2020-07-13 | End: 2020-07-21 | Stop reason: HOSPADM

## 2020-07-13 RX ORDER — ACETAMINOPHEN 325 MG/1
650 TABLET ORAL EVERY 6 HOURS PRN
Status: DISCONTINUED | OUTPATIENT
Start: 2020-07-13 | End: 2020-07-21 | Stop reason: HOSPADM

## 2020-07-13 RX ORDER — METOPROLOL SUCCINATE 50 MG/1
50 TABLET, EXTENDED RELEASE ORAL DAILY
Status: DISCONTINUED | OUTPATIENT
Start: 2020-07-13 | End: 2020-07-21 | Stop reason: HOSPADM

## 2020-07-13 RX ORDER — PROMETHAZINE HYDROCHLORIDE 25 MG/1
12.5 TABLET ORAL EVERY 6 HOURS PRN
Status: DISCONTINUED | OUTPATIENT
Start: 2020-07-13 | End: 2020-07-21 | Stop reason: HOSPADM

## 2020-07-13 RX ORDER — GABAPENTIN 100 MG/1
100 CAPSULE ORAL 2 TIMES DAILY
Status: DISCONTINUED | OUTPATIENT
Start: 2020-07-13 | End: 2020-07-21 | Stop reason: HOSPADM

## 2020-07-13 RX ORDER — FUROSEMIDE 20 MG/1
20 TABLET ORAL DAILY
Status: DISCONTINUED | OUTPATIENT
Start: 2020-07-13 | End: 2020-07-16

## 2020-07-13 RX ORDER — AMITRIPTYLINE HYDROCHLORIDE 25 MG/1
12.5 TABLET, FILM COATED ORAL NIGHTLY
Status: DISCONTINUED | OUTPATIENT
Start: 2020-07-13 | End: 2020-07-21 | Stop reason: HOSPADM

## 2020-07-13 RX ORDER — DIVALPROEX SODIUM 125 MG/1
250 CAPSULE, COATED PELLETS ORAL NIGHTLY
Status: DISCONTINUED | OUTPATIENT
Start: 2020-07-13 | End: 2020-07-21 | Stop reason: HOSPADM

## 2020-07-13 RX ORDER — SODIUM CHLORIDE 0.9 % (FLUSH) 0.9 %
10 SYRINGE (ML) INJECTION EVERY 12 HOURS SCHEDULED
Status: DISCONTINUED | OUTPATIENT
Start: 2020-07-13 | End: 2020-07-21 | Stop reason: HOSPADM

## 2020-07-13 RX ORDER — HYDROCODONE BITARTRATE AND ACETAMINOPHEN 5; 325 MG/1; MG/1
1 TABLET ORAL EVERY 4 HOURS PRN
Status: DISCONTINUED | OUTPATIENT
Start: 2020-07-13 | End: 2020-07-21 | Stop reason: HOSPADM

## 2020-07-13 RX ORDER — SODIUM CHLORIDE 0.9 % (FLUSH) 0.9 %
10 SYRINGE (ML) INJECTION PRN
Status: DISCONTINUED | OUTPATIENT
Start: 2020-07-13 | End: 2020-07-21 | Stop reason: HOSPADM

## 2020-07-13 RX ORDER — ACETAMINOPHEN 650 MG/1
650 SUPPOSITORY RECTAL EVERY 6 HOURS PRN
Status: DISCONTINUED | OUTPATIENT
Start: 2020-07-13 | End: 2020-07-21 | Stop reason: HOSPADM

## 2020-07-13 RX ORDER — AMIODARONE HYDROCHLORIDE 200 MG/1
200 TABLET ORAL DAILY
Status: DISCONTINUED | OUTPATIENT
Start: 2020-07-13 | End: 2020-07-21 | Stop reason: HOSPADM

## 2020-07-13 RX ORDER — POLYETHYLENE GLYCOL 3350 17 G/17G
17 POWDER, FOR SOLUTION ORAL DAILY PRN
Status: DISCONTINUED | OUTPATIENT
Start: 2020-07-13 | End: 2020-07-21 | Stop reason: HOSPADM

## 2020-07-13 RX ADMIN — CEFTRIAXONE 1 G: 1 INJECTION, POWDER, FOR SOLUTION INTRAMUSCULAR; INTRAVENOUS at 03:13

## 2020-07-13 RX ADMIN — AMIODARONE HYDROCHLORIDE 200 MG: 200 TABLET ORAL at 08:54

## 2020-07-13 RX ADMIN — DIVALPROEX SODIUM 250 MG: 125 CAPSULE ORAL at 20:31

## 2020-07-13 RX ADMIN — TAMSULOSIN HYDROCHLORIDE 0.4 MG: 0.4 CAPSULE ORAL at 08:54

## 2020-07-13 RX ADMIN — SODIUM CHLORIDE, PRESERVATIVE FREE 10 ML: 5 INJECTION INTRAVENOUS at 08:55

## 2020-07-13 RX ADMIN — ENOXAPARIN SODIUM 40 MG: 40 INJECTION SUBCUTANEOUS at 08:54

## 2020-07-13 RX ADMIN — GABAPENTIN 100 MG: 100 CAPSULE ORAL at 20:32

## 2020-07-13 RX ADMIN — VANCOMYCIN HYDROCHLORIDE 1250 MG: 10 INJECTION, POWDER, LYOPHILIZED, FOR SOLUTION INTRAVENOUS at 00:01

## 2020-07-13 RX ADMIN — FUROSEMIDE 20 MG: 20 TABLET ORAL at 08:54

## 2020-07-13 RX ADMIN — ASPIRIN 81 MG: 81 TABLET, COATED ORAL at 20:31

## 2020-07-13 RX ADMIN — DEXAMETHASONE SODIUM PHOSPHATE 10 MG: 4 INJECTION, SOLUTION INTRAMUSCULAR; INTRAVENOUS at 08:54

## 2020-07-13 RX ADMIN — METOPROLOL SUCCINATE 50 MG: 50 TABLET, EXTENDED RELEASE ORAL at 08:54

## 2020-07-13 RX ADMIN — CEFEPIME HYDROCHLORIDE 2 G: 2 INJECTION, POWDER, FOR SOLUTION INTRAVENOUS at 18:45

## 2020-07-13 RX ADMIN — ASPIRIN 81 MG: 81 TABLET, COATED ORAL at 08:54

## 2020-07-13 RX ADMIN — GABAPENTIN 100 MG: 100 CAPSULE ORAL at 08:54

## 2020-07-13 RX ADMIN — AMITRIPTYLINE HYDROCHLORIDE 12.5 MG: 25 TABLET, FILM COATED ORAL at 20:32

## 2020-07-13 RX ADMIN — AZITHROMYCIN MONOHYDRATE 500 MG: 500 INJECTION, POWDER, LYOPHILIZED, FOR SOLUTION INTRAVENOUS at 04:07

## 2020-07-13 RX ADMIN — VANCOMYCIN HYDROCHLORIDE 1250 MG: 10 INJECTION, POWDER, LYOPHILIZED, FOR SOLUTION INTRAVENOUS at 20:31

## 2020-07-13 ASSESSMENT — PAIN SCALES - GENERAL
PAINLEVEL_OUTOF10: 0

## 2020-07-13 NOTE — ED NOTES
Bed: B-10  Expected date:   Expected time:   Means of arrival: [facility]  Comments:  Hypoxic, recent hip replacement, [facility]     [facility], [facility]  07/12/20 2113

## 2020-07-13 NOTE — CARE COORDINATION
Corey reviewed pt chart. Pt is from [facility]. Call to Devan at [facility] ([phone]) confirmed he was recently admitted SNF and was previously LTC. Devan reports they will want pt to return to SNF at [facility] and will need [facility] precert to return. Corey call to patient's primary contact daughter, Julieth Chiang ([phone]). She confirmed above plan.      Electronically signed by DINA Webb, APOLINAR on 7/13/2020 at 9:48 AM

## 2020-07-13 NOTE — PROGRESS NOTES
Pt arrived to room 5265_accompanied by RN and emergency room technician. Placed patient on heart monitor. VSS. Assessment complete. No s/s of distress, pt tolerated well. Pt A/O x4. O2 sat _92_% on 15LNRB___. Non skid footwear placed on feet. Bed in lowest position with wheels locked. Patient instructed to call out for needs, denies needs at this time. Call light within reach. Will cont to monitor.

## 2020-07-13 NOTE — CONSULTS
Patient is seen at the request of Dr. Octavia Navas for abnormal chest CT     PCP: Jese Matt MD    Please note that history is obtained from chart. Patient does not speak when asked questions. HISTORY OF PRESENT ILLNESS: 80y.o. year old male  who was admitted on 7/13/20 for respiratory failure. He was initially admitted at the beginning of the month after a fall during which he suffered a hip fracture. While in house he went into hypoxic respiratory failure. He was treated with antibiotics and diuretics. He was discharged to a nursing home. EMS was called to his nursing home yesterday due to hypoxia to the 60s and 70s. In the ER he was febrile to 103.4 °F.  Chest CT showed bilateral infiltrates and consolidation.     PAST MEDICAL HISTORY:  Past Medical History:   Diagnosis Date    Alcohol abuse     Arthritis of left knee     advanced    Atrial fibrillation (HCC)     HTN (hypertension)     Hyperlipidemia     PAF (paroxysmal atrial fibrillation) (HCC)        PAST SURGICAL HISTORY:  Past Surgical History:   Procedure Laterality Date    HIP FRACTURE SURGERY Left     HIP SURGERY Right 7/6/2020    RIGHT HIP HEMIARTHROPLASTY performed by Monisha Hedrick MD at [address] [place] [address] Street:  Current Facility-Administered Medications: amiodarone (CORDARONE) tablet 200 mg, 200 mg, Oral, Daily  amitriptyline (ELAVIL) tablet 12.5 mg, 12.5 mg, Oral, Nightly  aspirin EC tablet 81 mg, 81 mg, Oral, BID  furosemide (LASIX) tablet 20 mg, 20 mg, Oral, Daily  divalproex (DEPAKOTE SPRINKLE) capsule 250 mg, 250 mg, Oral, Nightly  gabapentin (NEURONTIN) capsule 100 mg, 100 mg, Oral, BID  HYDROcodone-acetaminophen (NORCO) 5-325 MG per tablet 1 tablet, 1 tablet, Oral, Q4H PRN  metoprolol succinate (TOPROL XL) extended release tablet 50 mg, 50 mg, Oral, Daily  tamsulosin (FLOMAX) capsule 0.4 mg, 0.4 mg, Oral, Daily  sodium chloride flush 0.9 % injection 10 mL, 10 mL, Intravenous, 2 times per day  sodium chloride flush 0.9 % injection 10 mL, 10 mL, Intravenous, PRN  acetaminophen (TYLENOL) tablet 650 mg, 650 mg, Oral, Q6H PRN **OR** acetaminophen (TYLENOL) suppository 650 mg, 650 mg, Rectal, Q6H PRN  polyethylene glycol (GLYCOLAX) packet 17 g, 17 g, Oral, Daily PRN  promethazine (PHENERGAN) tablet 12.5 mg, 12.5 mg, Oral, Q6H PRN **OR** ondansetron (ZOFRAN) injection 4 mg, 4 mg, Intravenous, Q6H PRN  enoxaparin (LOVENOX) injection 40 mg, 40 mg, Subcutaneous, Daily  azithromycin (ZITHROMAX) 500 mg in D5W 250ml addavial, 500 mg, Intravenous, Q24H **AND** cefTRIAXone (ROCEPHIN) 1 g IVPB in 50 mL D5W minibag, 1 g, Intravenous, Q24H  dexamethasone (DECADRON) injection 10 mg, 10 mg, Intravenous, Daily  vancomycin (VANCOCIN) intermittent dosing (placeholder), , Other, RX Placeholder      ALLERGIES:  Patient has No Known Allergies. FAMILY HISTORY:  family history includes Heart Disease in his father; Other in his father and mother.     SOCIAL HISTORY:  Social History     Socioeconomic History    Marital status:      Spouse name: Not on file    Number of children: Not on file    Years of education: Not on file    Highest education level: Not on file   Occupational History    Occupation: part time [address] resource strain: Not on file    Food insecurity     Worry: Not on file     Inability: Not on file   [employer] needs     Medical: Not on file     Non-medical: Not on file   Tobacco Use    Smoking status: Never Smoker    Smokeless tobacco: Never Used   Substance and Sexual Activity    Alcohol use: Not Currently     Comment: 20 months sober     Drug use: No    Sexual activity: Not Currently   Lifestyle    Physical activity     Days per week: Not on file     Minutes per session: Not on file    Stress: Not on file   Relationships    Social connections     Talks on phone: Not on file     Gets together: Not on file     Attends Pent[employer]alism service: Not on file     Active member of club or organization: Not on file     Attends meetings of clubs or organizations: Not on file     Relationship status: Not on file    Intimate partner violence     Fear of current or ex partner: Not on file     Emotionally abused: Not on file     Physically abused: Not on file     Forced sexual activity: Not on file   Other Topics Concern    Not on file   Social History Narrative    Not on file      reports that he has never smoked. He has never used smokeless tobacco.    REVIEW OF SYSTEMS:  Constitutional: +for fever  Eyes: Negative for redness   Gastrointestinal: Negative for vomiting, diarrhea   Genitourinary: Negative for hematuria   Skin: Negative for rash  Neurological: Negative for syncope  Hematological: Negative for adenopathy    Otherwise unable to obtain as patient does not speak when asked questions    Objective:   PHYSICAL EXAM:  Blood pressure (!) 100/50, pulse 76, temperature 98.1 °F (36.7 °C), temperature source Axillary, resp. rate 16, height 5' 8\" (1.727 m), weight 174 lb 13.2 oz (79.3 kg), SpO2 95 %. on 6L NC    Gen: Well developed; well nourished  Eyes: No scleral icterus. No conjunctival injection. ENT:  Oropharynx clear. External appearance of ears and nose normal.  Neck: Trachea midline. No obvious mass. No visible thyroid enlargement    Respiratory: Crackles bilaterally, no accessory muscle use  Cardiovascular: Regular rate and rhythm, no appreciable murmurs. No edema. Gastrointestinal: Soft, non-tender. No hernia  Skin: Warm and dry. No rashes or ulcers on visible areas. Normal texture and turgor  Lymphatic: No cervical LAD. No supraclavicular LAD. Musculoskeletal: No cyanosis, clubbing or joint deformity.     Psychiatric: Normal mood and affect; appears to have poor insight and judgement       Data Reviewed:   LABS:  CBC:   Recent Labs     07/12/20 2123   WBC 19.4*   HGB 10.0*   HCT 31.7*   MCV 96.5        BMP:   Recent Labs     07/12/20 2123   *   K 4.3      CO2 28 BUN 28*   CREATININE 1.5*     LIVER PROFILE:   Recent Labs     07/12/20 2123   AST 13*   ALT 7*   BILITOT 0.5   ALKPHOS 65     PT/INR:   Recent Labs     07/12/20 2123   PROTIME 13.7*   INR 1.18*     APTT:   Recent Labs     07/12/20 2123   APTT 30.3     Images and reports from chest imaging were reviewed by me. My interpretation is:  CXR (7/12/20): Bilateral infiltrates (decreased compared to 7/6)  Chest CT (7/12/20): No LAD; no discernible PE; bilateral pleural effusions; bilateral infiltrates and consolidation (R>L; increased on the right and decreased on the left compared to 7/2/20)      ECHO (7/1/20)  Summary   There is mild concentric left ventricular hypertrophy with normal size and   systolic function. Ejection fraction is visually estimated to be 60-65%. Grade III diastolic dysfunction with elevated LV filling pressures. The right ventricle is normal in size and function. Moderate biatrial enlargement appreciated. Mild to moderate tricuspid regurgitation. Mild mitral regurgitation is present. The right coronary cusp is restricted. The aortic valve is thickened/calcified with decreased leaflet mobility   consistent with mild aortic stenosis. Mean gradient 12mmHg and velocity      Assessment:     Acute hypoxic respiratory failure  Multifocal pneumonia  Pleural effusions    Plan:      Acute hypoxic respiratory failure  -Appears to be due to pneumonia +/- volume overload  -Wean supplemental oxygen as able to keep oxygen saturation greater than 90%    Multifocal pneumonia  -Agree with sending COVID PCR  -He received ceftriaxone/azithromycin during his last admission. Change antibiotics to cefepime and vancomycin  -Send fungal serologies  -Follow culture data    Pleural effusions  -Lasix daily      Thank you for allowing me to participate in the care of this patient. Will follow.      Luis Sarabia MD  [place] [facility]

## 2020-07-13 NOTE — PROGRESS NOTES
Hospitalist Progress Note      PCP: Amandeep Conrad MD    Date of Admission: 7/12/2020    Chief Complaint: SOB    Hospital Course: 80 y.o. male With medical history including atrial fibrillation, hypertension, hyperlipidemia presents with complaints of shortness of breath. Patient recently underwent surgical repair of right hip fracture on 7/6/2020. In the emergency department patient was found to be hypoxic requiring support with BiPAP to maintain adequate oxygenation. Laboratory work-up notable for marked leukocytosis. Imaging demonstrated multifocal opacities although these are seen on previous imaging    Subjective: Patient seen and examined. Patient CT scan shows bilateral pleural effusions along with bilateral opacities. Patient presented on 3 L oxygen saturating 88%. He required CPAP for a second and a nonrebreather and he is now on 6 L. Patient was febrile up to 103 which is since come down to 98. Is proBNP is actually lower than it was before, his creatinine is stable at 1.5. Patient's white count has gone up from 14-19. His troponin is also slightly elevated 0.04. Blood cultures have been drawn. His COVID-19 test returned negative. I have added on a another COVID-19 test but this time PCR.   Pulmonology was also consulted      Medications:  Reviewed    Infusion Medications   Scheduled Medications    amiodarone  200 mg Oral Daily    amitriptyline  12.5 mg Oral Nightly    aspirin EC  81 mg Oral BID    furosemide  20 mg Oral Daily    divalproex  250 mg Oral Nightly    gabapentin  100 mg Oral BID    metoprolol succinate  50 mg Oral Daily    tamsulosin  0.4 mg Oral Daily    sodium chloride flush  10 mL Intravenous 2 times per day    enoxaparin  40 mg Subcutaneous Daily    azithromycin  500 mg Intravenous Q24H    And    cefTRIAXone (ROCEPHIN) IV  1 g Intravenous Q24H    dexamethasone  10 mg Intravenous Daily    vancomycin (VANCOCIN) intermittent dosing (placeholder)   Other RX Placeholder     PRN Meds: HYDROcodone-acetaminophen, sodium chloride flush, acetaminophen **OR** acetaminophen, polyethylene glycol, promethazine **OR** ondansetron    No intake or output data in the 24 hours ending 07/13/20 1437    Physical Exam Performed:    BP (!) 100/50   Pulse 76   Temp 98.1 °F (36.7 °C) (Axillary)   Resp 16   Ht 5' 8\" (1.727 m)   Wt 174 lb 13.2 oz (79.3 kg)   SpO2 95%   BMI 26.58 kg/m²     General appearance: No apparent distress, appears stated age and cooperative. HEENT: Pupils equal, round, and reactive to light. Conjunctivae/corneas clear. Neck: Supple, with full range of motion. No jugular venous distention. Trachea midline. Respiratory:  Normal respiratory effort. Diminished breath sounds bilateral   cardiovascular: Regular rate and rhythm with normal S1/S2   Abdomen: Soft, non-tender, non-distended with normal bowel sounds. Musculoskeletal: No clubbing, cyanosis or edema bilaterally. Skin: Skin color, texture, turgor normal.  No rashes or lesions. Neurologic:  Neurovascularly intact without any focal sensory/motor deficits.  Cranial nerves: II-XII intact, grossly non-focal.  Psychiatric: Alert and oriented, thought content appropriate, normal insight  Capillary Refill: Brisk,< 3 seconds   Peripheral Pulses: +2 palpable, equal bilaterally       Labs:   Recent Labs     07/12/20 2123   WBC 19.4*   HGB 10.0*   HCT 31.7*        Recent Labs     07/12/20 2123   *   K 4.3      CO2 28   BUN 28*   CREATININE 1.5*   CALCIUM 8.6     Recent Labs     07/12/20 2123   AST 13*   ALT 7*   BILITOT 0.5   ALKPHOS 65     Recent Labs     07/12/20 2123   INR 1.18*     Recent Labs     07/12/20 2123   TROPONINI 0.04*       Urinalysis:      Lab Results   Component Value Date    NITRU Negative 07/01/2020    WBCUA 1 07/01/2020    RBCUA 1 07/01/2020    BLOODU Negative 07/01/2020    SPECGRAV 1.013 07/01/2020    GLUCOSEU Negative 07/01/2020       Radiology:  CT CHEST PULMONARY EMBOLISM W CONTRAST   Final Result   Negative for acute pulmonary embolism. Respiratory motion artifact limits   evaluation to the segmental level. Features of heart failure with moderate volume pleural effusions. Progressive multifocal airspace disease may in part represent asymmetric   pulmonary edema, however cannot exclude superimposed infection (including   atypical or viral pathogens) or ARDS. Enlarged branch main pulmonary arteries may be seen with pulmonary   hypertension. CT HEAD WO CONTRAST   Final Result   1. No acute intracranial abnormality. 2. Cerebral and cerebellar parenchymal volume loss with chronic microvascular   white matter ischemic disease. XR CHEST 1 VW   Final Result   No interval change in bilateral multifocal airspace opacities. Small right   pleural effusion. Assessment/Plan:    Sepsis  Temperature, heart rate, respirations, source lungs  Lactic acid normal  Hold on IV fluids  IV antibiotics given  Reorder COVID-19    Acute respiratory failure with hypoxia  Titrate oxygen to keep saturations above 90%  Requiring nonrebreather and CPAP for a while  Down to 6 L  Pulmonology consulted    Pneumonia  Unsure if bacterial or viral  IV antibiotics given  Procalcitonin added on  Recheck COVID-19 PCR  Strep and Legionella ordered, MRSA nasal probe ordered    Atrial fibrillation  Continue home amiodarone    DVT Prophylaxis: lovenox  Diet: DIET GENERAL; Low Sodium (2 GM);  Daily Fluid Restriction: 2000 ml  Code Status: DNR-CCA    PT/OT Eval Status: Order    Maury Cadena MD

## 2020-07-13 NOTE — CARE COORDINATION
Pt lives in [address] [place] [address] at [facility]. He was recently hospitalized for a right hip fx and admitted [facility] under his [facility] insurance on Friday, 7/10/2020. He was re-admitted to the hospital on 7/12/2020,  He will need a new [address] prior to D/C. JENNIFER Wiggins following.     Mikel Robles [phone]  Electronically signed by Lam La on 7/13/2020 at 10:10 AM

## 2020-07-13 NOTE — H&P
Hospital Medicine History & Physical      PCP: Gavin Villagomez MD    Date of Admission: 7/12/2020    Date of Service: Pt seen/examined on 7/12/2020 at [facility]    Chief Complaint: Hypoxia      History Of Present Illness:    80 y.o. male With medical history including atrial fibrillation, hypertension, hyperlipidemia presents with complaints of shortness of breath. Patient recently underwent surgical repair of right hip fracture on 7/6/2020. In the emergency department patient was found to be hypoxic requiring support with BiPAP to maintain adequate oxygenation. Laboratory work-up notable for marked leukocytosis. Imaging demonstrated multifocal opacities although these are seen on previous imaging        Past Medical History:        Diagnosis Date    Alcohol abuse     Arthritis of left knee     advanced    Atrial fibrillation (HCC)     HTN (hypertension)     Hyperlipidemia     PAF (paroxysmal atrial fibrillation) (HCC)        Past Surgical History:        Procedure Laterality Date    HIP FRACTURE SURGERY Left     HIP SURGERY Right 7/6/2020    RIGHT HIP HEMIARTHROPLASTY performed by Gretta Scott MD at [place] [address]       Medications Prior to Admission:   Prior to Admission medications    Medication Sig Start Date End Date Taking? Authorizing Provider   HYDROcodone-acetaminophen (NORCO) 5-325 MG per tablet Take 1 tablet by mouth every 4 hours as needed for Pain for up to 5 days. 7/10/20 7/15/20  MIKE Herrmann - CNP   aspirin EC 81 MG EC tablet Take 1 tablet by mouth 2 times daily Take for DVT blood clot prophylaxis. Please avoid missing doses.  7/10/20 8/9/20  MIKE Talavera CNP   ipratropium-albuterol (DUONEB) 0.5-2.5 (3) MG/3ML SOLN nebulizer solution Inhale 3 mLs into the lungs every 4 hours as needed for Shortness of Breath 7/10/20   Natalie Narayan MD   furosemide (LASIX) 20 MG tablet Take 1 tablet by mouth daily 7/11/20   Natalie Narayan MD   polyethylene glycol (GLYCOLAX) 17 g packet Take 17 g by mouth daily as needed for Constipation 7/10/20 8/9/20  Sailaja Kennedy MD   hypromellose (ISOPTO TEARS) 0.5 % ophthalmic solution Place 1 drop into both eyes 2 times daily     Historical Provider, MD   amitriptyline (ELAVIL) 25 MG tablet Take 12.5 mg by mouth nightly    Historical Provider, MD   gabapentin (NEURONTIN) 100 MG capsule Take 100 mg by mouth 2 times daily. Historical Provider, MD   melatonin 5 MG TABS tablet Take 5 mg by mouth nightly    Historical Provider, MD   vitamin B-12 (CYANOCOBALAMIN) 1000 MCG tablet Take 1,000 mcg by mouth daily    Historical Provider, MD   divalproex (DEPAKOTE SPRINKLE) 125 MG capsule Take 250 mg by mouth nightly    Historical Provider, MD   acetaminophen (TYLENOL) 325 MG tablet Take 650 mg by mouth every 6 hours as needed for Pain     Historical Provider, MD   amiodarone (CORDARONE) 200 MG tablet Take 1 tablet by mouth daily 6/19/18   Oziel Fontaine MD   metoprolol succinate (TOPROL XL) 50 MG extended release tablet Take 1 tablet by mouth daily 6/19/18   Oziel Fontaine MD   tamsulosin (FLOMAX) 0.4 MG capsule TAKE ONE CAPSULE BY MOUTH DAILY 6/21/17   Oziel Fontaine MD   Nutritional Supplements (ENSURE PO) Take 1 Can by mouth daily    Historical Provider, MD   calcium-vitamin D (OSCAL-500) 500-200 MG-UNIT per tablet Take 1 tablet by mouth daily. Historical Provider, MD       Allergies:  Patient has no known allergies. Social History:      The patient currently lives at [facility]    TOBACCO: Unable to answer  ETOH: Unable to answer  DRUGS: Unable to answer      Family History:      Positive as follows:        Problem Relation Age of Onset    Other Mother     Other Father     Heart Disease Father        REVIEW OF SYSTEMS:   Pertinent positives as noted in the HPI. All other systems reviewed and negative.     PHYSICAL EXAM PERFORMED:    /62   Pulse 91   Temp 103.4 °F (39.7 °C) (Rectal)   Resp 18 SpO2 91%     General appearance:  No apparent distress, appears stated age    HEENT:  Normal cephalic, atraumatic without obvious deformity. Pupils equal, round, and reactive to light. Extra ocular muscles intact. Conjunctivae/corneas clear. Neck: Supple, with full range of motion. No jugular venous distention. Trachea midline. Respiratory:  Normal respiratory effort. Coarse breath sounds bilaterally   cardiovascular:  Regular rate and rhythm with normal S1/S2 without murmurs, rubs or gallops. Abdomen: Soft, non-tender, non-distended with normal bowel sounds. Musculoskeletal:  No clubbing, cyanosis or edema bilaterally. Full range of motion without deformity. Skin: Skin color, texture, turgor normal.  No rashes or lesions. Neurologic:  Neurovascularly intact without any focal sensory/motor deficits.  Cranial nerves: II-XII intact, grossly non-focal.  Psychiatric:  Alert and oriented, thought content appropriate, normal insight  Capillary Refill: Brisk,< 3 seconds   Peripheral Pulses: +2 palpable, equal bilaterally       Labs:     Recent Labs     07/10/20  0421 07/12/20  2123   WBC 14.2* 19.4*   HGB 8.9* 10.0*   HCT 27.8* 31.7*    414     Recent Labs     07/10/20  0421 07/12/20  2123   * 148*   K 3.7 4.3    108   CO2 31 28   BUN 36* 28*   CREATININE 1.6* 1.5*   CALCIUM 8.5 8.6   PHOS 2.6  --      Recent Labs     07/12/20 2123   AST 13*   ALT 7*   BILITOT 0.5   ALKPHOS 65     Recent Labs     07/12/20 2123   INR 1.18*     Recent Labs     07/12/20 2123   TROPONINI 0.04*       Urinalysis:      Lab Results   Component Value Date    NITRU Negative 07/01/2020    WBCUA 1 07/01/2020    RBCUA 1 07/01/2020    BLOODU Negative 07/01/2020    SPECGRAV 1.013 07/01/2020    GLUCOSEU Negative 07/01/2020       EKG:    I have reviewed the EKG with the following interpretation: Sinus rhythm without ST segment changes    Radiology:     CT CHEST PULMONARY EMBOLISM W CONTRAST   Preliminary Result   Negative for acute pulmonary embolism. Respiratory motion artifact limits   evaluation to the segmental level. Features of heart failure with moderate volume pleural effusions. Progressive multifocal airspace disease may in part represent asymmetric   pulmonary edema, however cannot exclude superimposed infection (including   atypical or viral pathogens) or ARDS. Enlarged branch main pulmonary arteries may be seen with pulmonary   hypertension. CT HEAD WO CONTRAST   Preliminary Result   1. No acute intracranial abnormality. 2. Cerebral and cerebellar parenchymal volume loss with chronic microvascular   white matter ischemic disease. XR CHEST 1 VW   Final Result   No interval change in bilateral multifocal airspace opacities. Small right   pleural effusion. ASSESSMENT  Acute hypoxic respiratory failure  Sepsis  Presumed pneumonia -status post vancomycin and Zosyn in ED  COVID infection on previous admission  History of hip fracture earlier this month  Chronic kidney disease      PLAN  Continue BiPAP to maintain oxygen saturation greater than 88%  Rocephin and azithromycin for empiric coverage of bacterial pneumonia-post vancomycin and Zosyn  Nasal swab for MRSA for decision making on need for continued vancomycin  Supportive care  IV dexamethasone 10 mg daily  Labs for Legionella, mycoplasma, strep pneumonia  Monitor renal function    DVT Prophylaxis: Lovenox  Diet: Regular  Code Status: DO NOT RESUSCITATE Comfort Care arrest  Surrogate: Wife, Rachel Fernandes TXIYV,[phone] [facility])    Dispo -admit inpatient for anticipated greater than 2 midnight stay       Gualberto Spence MD    Thank you Jodi Magdaleno MD for the opportunity to be involved in this patient's care. If you have any questions or concerns please feel free to contact me at [phone].

## 2020-07-13 NOTE — CONSULTS
Clinical Pharmacy Note  Vancomycin Consult    Carol Moralez is a 80 y.o. male ordered Vancomycin for pneumonia; consult received from Dr. Alfonzo Green to manage therapy. Also receiving azithromycin and Rocephin. Patient Active Problem List   Diagnosis    Hypertension, benign    Osteopenia    Spinal stenosis    Mixed hyperlipidemia    Radial nerve injury    Hip fracture, left (HCC)    Arthritis of left knee    Trigger finger, acquired    Paroxysmal atrial fibrillation (HCC)    Frequent PVCs    Alcohol abuse    Hypokalemia    Syncope and collapse    Hyperlipidemia    PAF (paroxysmal atrial fibrillation) (HCC)    Essential hypertension    Macrocytic anemia    Closed fracture of ramus of left pubis (HCC)    Contusion of left hip    SVT (supraventricular tachycardia) (HCC)    Accidental fall    Closed head injury with concussion    Dementia (HCC)    Hyponatremia    Scalp laceration    Tachycardia    Closed nondisplaced fracture of proximal phalanx of left ring finger    Compression fracture of T12 vertebra (HCC)    Hyponatremia    Closed fracture of neck of right femur (HCC)    Closed fracture of right hip (HCC)    Acute respiratory failure with hypoxia ([address]    COVID-19       Allergies:  Patient has no known allergies. Temp max:  Temp (24hrs), Av.4 °F (39.7 °C), Min:103.4 °F (39.7 °C), Max:103.4 °F (39.7 °C)      Recent Labs     07/10/20  0421 203   WBC 14.2* 19.4*       Recent Labs     07/10/20  0421 203   BUN 36* 28*   CREATININE 1.6* 1.5*       No intake or output data in the 24 hours ending 20 2344    Culture Results:  Pending    Ht Readings from Last 1 Encounters:   20 5' 8\" (1.727 m)        Wt Readings from Last 1 Encounters:   20 175 lb 7.8 oz (79.6 kg)         Estimated Creatinine Clearance: 34 mL/min (A) (based on SCr of 1.5 mg/dL (H)). Assessment/Plan:  Vancomycin 1250 mg IV every x 1 ordered.   Regimen projects a trough level of

## 2020-07-13 NOTE — PROGRESS NOTES
4 Eyes Admission Assessment     I agree as the admission nurse that 2 RN's have performed a thorough Head to Toe Skin Assessment on the patient. ALL assessment sites listed below have been assessed on admission. Areas assessed by both nurses:   [x]   Head, Face, and Ears   [x]   Shoulders, Back, and Chest  [x]   Arms, Elbows, and Hands   [x]   Coccyx, Sacrum, and Ischum  [x]   Legs, Feet, and Heels        Does the Patient have Skin Breakdown?   No         Viet Prevention initiated:  No   Wound Care Orders initiated:  No      [facility] nurse consulted for Pressure Injury (Stage 3,4, Unstageable, DTI, NWPT, and Complex wounds):  No      Nurse 1 eSignature: Electronically signed by Blanca Nair RN on 7/13/20 at 5:40 AM EDT    **SHARE this note so that the co-signing nurse is able to place an eSignature**    Nurse 2 eSignature: Electronically signed by Zenaida Nichols RN on 7/13/20 at 7:07 AM EDT

## 2020-07-13 NOTE — ED NOTES
Fall risk screening completed. Fall risk bracelet applied to patient. Non-skid socks provided and placed on patient. The fall risk is indicated using  dome light. The call light is within the patient's reach, and instructions for use were provided. The bed is in the lowest position with wheels locked. The patient has been advised to notify staff, using the call light, if there is a need to get up or use restroom. The patient verbalized understanding of safety precautions and how to contact staff for assistance.  Bed alarm in place     Irene Beery, [place]  07/12/20 2253

## 2020-07-13 NOTE — PLAN OF CARE
Problem: Falls - Risk of:  Goal: Absence of physical injury  Description: Absence of physical injury  Outcome: Ongoing     Problem: Skin Integrity:  Goal: Will show no infection signs and symptoms  Description: Will show no infection signs and symptoms  Outcome: Ongoing     Problem: Skin Integrity:  Goal: Absence of new skin breakdown  Description: Absence of new skin breakdown  Outcome: Ongoing

## 2020-07-13 NOTE — CONSULTS
Clinical Pharmacy Note  Vancomycin Consult    Olegario Aldridge is a 80 y.o. male ordered Vancomycin for ; consult received from Dr. Bro Samuel to manage therapy. Also receiving . Patient Active Problem List   Diagnosis    Hypertension, benign    Osteopenia    Spinal stenosis    Mixed hyperlipidemia    Radial nerve injury    Hip fracture, left (HCC)    Arthritis of left knee    Trigger finger, acquired    Paroxysmal atrial fibrillation (HCC)    Frequent PVCs    Alcohol abuse    Hypokalemia    Syncope and collapse    Hyperlipidemia    [facility]    Essential hypertension    Macrocytic anemia    Closed fracture of ramus of left pubis (HCC)    Contusion of left hip    SVT (supraventricular tachycardia) ([facility]    Accidental fall    Closed head injury with concussion    Dementia ([facility])    Hyponatremia    Scalp laceration    Tachycardia    Closed nondisplaced fracture of proximal phalanx of left ring finger    Compression fracture of T12 vertebra ([facility])    Hyponatremia    Closed fracture of neck of right femur ([facility])    Closed fracture of right hip (HCC)    Acute respiratory failure with hypoxia ([facility])    COVID-19    Multifocal pneumonia    Pleural effusion, bilateral       Allergies:  Patient has no known allergies. Temp max:  Temp (24hrs), Av °F (37.8 °C), Min:98.1 °F (36.7 °C), Max:103.4 °F (39.7 °C)      Recent Labs     20   WBC 19.4*       Recent Labs     20   BUN 28*   CREATININE 1.5*         Intake/Output Summary (Last 24 hours) at 2020 1814  Last data filed at 2020 1304  Gross per 24 hour   Intake 0 ml   Output --   Net 0 ml       Culture Results:      Ht Readings from Last 1 Encounters:   20 5' 8\" (1.727 m)        Wt Readings from Last 1 Encounters:   20 174 lb 13.2 oz (79.3 kg)         Estimated Creatinine Clearance: 34 mL/min (A) (based on SCr of 1.5 mg/dL (H)).     Assessment/Plan:  Vancomycin 1250 mg IV  X 1   Level ordered for 0800. Thank you for the consult.

## 2020-07-13 NOTE — ED PROVIDER NOTES
Triage Chief Complaint:   Shortness of Breath (Pt came in via EMS with worsening shortness of breath and hypoxia throughtout the day. EMS reports pt \"had oxygen saturation in 70's on arrival\". )    [place]:  Ai Garcia is a 80 y.o. male that presents from facility for hypoxia and shortness of breath in the context of undergoing recent surgery on fracture of right hip on 7/6. Past medical history of atrial fibrillation, paroxysmal atrial fibrillation, chronic kidney disease. Per EMS report at the facility he was saturating the 60s and 70s so they started him on nonrebreather. He arrives alert and awake and oriented to self. Denies abdominal pain, headache. States he does feel short of breath. He arrives febrile and hypoxic but alert and awake. DNR paperwork at bedside    ROS:  At least 12 systems reviewed and otherwise acutely negative except as in the [address]     Past Medical History:   Diagnosis Date    Alcohol abuse     Arthritis of left knee     advanced    Atrial fibrillation (HCC)     HTN (hypertension)     Hyperlipidemia     PAF (paroxysmal atrial fibrillation) (HCC)      Past Surgical History:   Procedure Laterality Date    HIP FRACTURE SURGERY Left     HIP SURGERY Right 7/6/2020    RIGHT HIP HEMIARTHROPLASTY performed by Shayna Rodriguez MD at [facility] History   Problem Relation Age of Onset    Other Mother     Other Father     Heart Disease Father      Social History     Socioeconomic History    Marital status:      Spouse name: Not on file    Number of children: Not on file    Years of education: Not on file    Highest education level: Not on file   Occupational History    Occupation: part time [address] resource strain: Not on file    Food insecurity     Worry: Not on file     Inability: Not on file    Transportation needs     Medical: Not on file     Non-medical: Not on file   Tobacco Use    Smoking status: Never Smoker    Smokeless 1 drop into both eyes 2 times daily       amitriptyline (ELAVIL) 25 MG tablet Take 12.5 mg by mouth nightly      gabapentin (NEURONTIN) 100 MG capsule Take 100 mg by mouth 2 times daily.  melatonin 5 MG TABS tablet Take 5 mg by mouth nightly      vitamin B-12 (CYANOCOBALAMIN) 1000 MCG tablet Take 1,000 mcg by mouth daily      divalproex (DEPAKOTE SPRINKLE) 125 MG capsule Take 250 mg by mouth nightly      acetaminophen (TYLENOL) 325 MG tablet Take 650 mg by mouth every 6 hours as needed for Pain       amiodarone (CORDARONE) 200 MG tablet Take 1 tablet by mouth daily 30 tablet 3    metoprolol succinate (TOPROL XL) 50 MG extended release tablet Take 1 tablet by mouth daily 30 tablet 3    tamsulosin (FLOMAX) 0.4 MG capsule TAKE ONE CAPSULE BY MOUTH DAILY 30 capsule 5    Nutritional Supplements (ENSURE PO) Take 1 Can by mouth daily      calcium-vitamin D (OSCAL-500) 500-200 MG-UNIT per tablet Take 1 tablet by mouth daily. No Known Allergies    [unfilled]    Nursing Notes Reviewed    Physical Exam:  Vitals:    07/12/20 2116   BP: 133/62   Pulse: 96   Resp: 18   Temp: 103.4 °F (39.7 °C)   SpO2: (!) 88%       GENERAL APPEARANCE: Awake and alert. Cooperative. Fatigued  HEAD: Normocephalic. Atraumatic. EYES: EOM's grossly intact. Sclera anicteric. ENT: Mucous membranes are moist. Tolerates saliva. No trismus. NECK: Supple. No meningismus. Trachea midline. HEART: RRR. Radial pulses 2+. LUNGS: Respirations unlabored. Coarse breath sounds bilaterally  ABDOMEN: Soft. Non-tender. No guarding or rebound. EXTREMITIES: No acute deformities. Right hip surgical site C/D/I  SKIN: Warm and dry. NEUROLOGICAL: No gross facial drooping. Moves all 4 extremities spontaneously. PSYCHIATRIC: Normal mood.     I have reviewed and interpreted all of the currently available lab results from this visit (if applicable):  Results for orders placed or performed during the hospital encounter of 07/12/20   CBC Auto Differential   Result Value Ref Range    WBC 19.4 (H) 4.0 - 11.0 K/uL    RBC 3.28 (L) 4.20 - 5.90 M/uL    Hemoglobin 10.0 (L) 13.5 - 17.5 g/dL    Hematocrit 31.7 (L) 40.5 - 52.5 %    MCV 96.5 80.0 - 100.0 fL    MCH 30.4 26.0 - 34.0 pg    MCHC 31.5 31.0 - 36.0 g/dL    RDW 15.2 12.4 - 15.4 %    Platelets 004 931 - 354 K/uL    MPV 7.5 5.0 - 10.5 fL    Neutrophils % 88.9 %    Lymphocytes % 3.8 %    Monocytes % 5.5 %    Eosinophils % 1.4 %    Basophils % 0.4 %    Neutrophils Absolute 17.2 (H) 1.7 - 7.7 K/uL    Lymphocytes Absolute 0.7 (L) 1.0 - 5.1 K/uL    Monocytes Absolute 1.1 0.0 - 1.3 K/uL    Eosinophils Absolute 0.3 0.0 - 0.6 K/uL    Basophils Absolute 0.1 0.0 - 0.2 K/uL   Protime-INR   Result Value Ref Range    Protime 13.7 (H) 10.0 - 13.2 sec    INR 1.18 (H) 0.86 - 1.14   APTT   Result Value Ref Range    aPTT 30.3 24.2 - 36.2 sec   Lactic Acid, Plasma   Result Value Ref Range    Lactic Acid 1.6 0.4 - 2.0 mmol/L        Radiographs (if obtained):  [] The following radiograph was interpreted by myself in the absence of a radiologist:  [x] Radiologist's Report Reviewed:     CT CHEST PULMONARY EMBOLISM W CONTRAST (Preliminary result)   Result time 07/12/20 22:46:25   Preliminary result by Shane Perez MD (07/12/20 22:46:25)                 Impression:     Negative for acute pulmonary embolism.  Respiratory motion artifact limits   evaluation to the segmental level. Features of heart failure, with moderate volume pleural effusions. Progressive multifocal airspace disease may in part represent asymmetric   pulmonary edema, however cannot exclude superimposed infection (including   atypical or viral pathogens) or ARDS. Enlarged branch main pulmonary arteries may be seen with pulmonary   hypertension.                       CT HEAD WO CONTRAST (Preliminary result)   Result time 07/12/20 22:38:00   Preliminary result by Marcelo Jimenez MD (07/12/20 22:38:00)                 Impression:     1.  No acute intracranial abnormality. 2. Cerebral and cerebellar parenchymal volume loss with chronic microvascular   white matter ischemic disease. Narrative:     EXAMINATION:   CT OF THE HEAD WITHOUT CONTRAST  7/12/2020 10:30 pm     TECHNIQUE:   CT of the head was performed without the administration of intravenous   contrast. Dose modulation, iterative reconstruction, and/or weight based   adjustment of the mA/kV was utilized to reduce the radiation dose to as low   as reasonably achievable. COMPARISON:   07/01/2020     HISTORY:   ORDERING SYSTEM PROVIDED HISTORY: CONFUSION   TECHNOLOGIST PROVIDED HISTORY:   Reason for exam:->CONFUSION   Has a \"code stroke\" or \"stroke alert\" been called? ->No   Reason for Exam: ams     FINDINGS:   BRAIN/VENTRICLES: The cerebral and cerebellar parenchyma demonstrate volume   loss.  Scattered and confluent low-attenuation areas are noted   supratentorially, compatible with severe chronic microvascular white matter   ischemic disease.  This exam is degraded by motion artifact.  There are no   areas of acute hemorrhage, mass, or midline shift. There are no abnormal extra-axial fluid collections.  The ventricles are   proportional to the cerebral sulci. Vascular calcifications are noted in the carotid siphons. ORBITS: Lens implants from prior cataract surgery are noted.  The orbits are   otherwise unremarkable. SINUSES: The visualized paranasal sinuses and mastoid air cells demonstrate   no acute abnormality. SOFT TISSUES/SKULL:  No acute abnormality of the visualized skull or soft   tissues.                  Preliminary result by Marcelo Jimenez MD (07/12/20 22:36:53)                 Impression:     1. No acute intracranial abnormality.    2. Cerebral and cerebellar parenchymal volume loss with chronic microvascular   white matter ischemic disease.                       XR CHEST 1 VW (Final result)   Result time 07/12/20 21:36:56   Final result by Billy Melchor Lebron Kamara MD (07/12/20 21:36:56)                 Impression:     No interval change in bilateral multifocal airspace opacities.  Small right   pleural effusion. Narrative:     EXAMINATION:   ONE XRAY VIEW OF THE CHEST     7/12/2020 9:21 pm     COMPARISON:   07/08/2020     HISTORY:   ORDERING SYSTEM PROVIDED HISTORY: SOB   TECHNOLOGIST PROVIDED HISTORY:   Reason for exam:->SOB   Reason for Exam: sob     Follow-up exam     FINDINGS:   Multifocal airspace opacities are again identified not significantly changed   since the prior exam.  Stable cardiac silhouette.  Small right pleural   effusion. . Nicholas Ogles left costophrenic angle sharp.  No pneumothorax.  The osseous   structures are stable.  Osteopenia. EKG (if obtained): (All EKG's are interpreted by myself in the absence of a cardiologist)  Initial EKG on my interpretation shows accelerated junctional rhythm with a rate of 93 bpm with no ST segment elevation    MDM:  Differential diagnosis: PE, PNA, hypoxia, sepsis    Patient does get pain control at his facility so Narcan was given to no avail or change in status. Patient was placed on high flow oxygen and his mental status remains good. bnp [id number]  TROP 0.04 but no CP and no STEMI on EKG    Patient is febrile with a white blood cell count of 19 and x-ray concerning for pneumonia. Zosyn ordered    Initially he was put on a nonrebreather but his ABG was very reassuring so respiratory and I have attempted to wean him and put him on 6L via Venturi mask as he is mouth breathing predominantly however this did not work so we put him on BiPAP despite a reassuring blood gas as his mental status is appropriate and CT of the chest showed no PE, rather shows questionable pulmonary edema versus infectious process. Will admit    Old records reviewed. Labs and imaging reviewed and results discussed with patient. .        Patient was given scripts for the following medications.  I counseled patient how to take these medications. New Prescriptions    No medications on file         CRITICAL CARE TIME   Total Critical Care time was 0 minutes, excluding separately reportable procedures. There was a high probability of clinically significant/life threatening deterioration in the patient's condition which required my urgent intervention.       Clinical Impression:  Hypoxia, pneumonia, pulmonary edema  (Please note that portions of this note may have been completed with a voice recognition program. Efforts were made to edit the dictations but occasionally words are mis-transcribed.)    MD Leon Castro MD  07/12/20 Carla Ho 8 Marnell Shone, MD  07/12/20 0531

## 2020-07-13 NOTE — PROGRESS NOTES
ABG drawn on NR,results :    Results for William Chavez" (MRN [id number]) as of 7/12/2020 23:19   Ref. Range 7/12/2020 21:55   pH, Arterial Latest Ref Range: 7.350 - 7.450  7.477 (H)   pCO2, Arterial Latest Ref Range: 35.0 - 45.0 mmHg 41.1   pO2, Arterial Latest Ref Range: 75.0 - 108.0 mmHg 90.9   HCO3, Arterial Latest Ref Range: 21.0 - 29.0 mmol/L 30.4 (H)   TCO2 (calc), Art Latest Ref Range: Not Established mmol/L 31.6   Base Excess, Arterial Latest Ref Range: -3.0 - 3.0 mmol/L 6.2 (H)   O2 Sat, Arterial Latest Ref Range: >92 % 97.6   O2 Content, Arterial Latest Ref Range: Not Established mL/dL 13   Methemoglobin, Arterial Latest Ref Range: <1.5 % 0.3   Carboxyhgb, Arterial   Latest Ref Range: 0.0 - 1.5 % 1.3     Pt placed on BIPAP per Dr Mayra Zhang orders.      Electronically signed by Chandler Watkins RCP on 7/12/20

## 2020-07-14 LAB
ALBUMIN SERPL-MCNC: 2.9 G/DL (ref 3.4–5)
ANION GAP SERPL CALCULATED.3IONS-SCNC: 10 MMOL/L (ref 3–16)
BASOPHILS ABSOLUTE: 0 K/UL (ref 0–0.2)
BASOPHILS RELATIVE PERCENT: 0.2 %
BUN BLDV-MCNC: 38 MG/DL (ref 7–20)
CALCIUM SERPL-MCNC: 8.8 MG/DL (ref 8.3–10.6)
CHLORIDE BLD-SCNC: 108 MMOL/L (ref 99–110)
CO2: 29 MMOL/L (ref 21–32)
CREAT SERPL-MCNC: 1.5 MG/DL (ref 0.8–1.3)
EOSINOPHILS ABSOLUTE: 0 K/UL (ref 0–0.6)
EOSINOPHILS RELATIVE PERCENT: 0 %
GFR AFRICAN AMERICAN: 53
GFR NON-AFRICAN AMERICAN: 44
GLUCOSE BLD-MCNC: 140 MG/DL (ref 70–99)
HCT VFR BLD CALC: 27.3 % (ref 40.5–52.5)
HEMOGLOBIN: 8.7 G/DL (ref 13.5–17.5)
L. PNEUMOPHILA SEROGP 1 UR AG: NORMAL
LYMPHOCYTES ABSOLUTE: 0.5 K/UL (ref 1–5.1)
LYMPHOCYTES RELATIVE PERCENT: 2.6 %
MCH RBC QN AUTO: 31 PG (ref 26–34)
MCHC RBC AUTO-ENTMCNC: 32 G/DL (ref 31–36)
MCV RBC AUTO: 96.9 FL (ref 80–100)
MONOCYTES ABSOLUTE: 0.9 K/UL (ref 0–1.3)
MONOCYTES RELATIVE PERCENT: 4.5 %
MRSA CULTURE ONLY: ABNORMAL
NEUTROPHILS ABSOLUTE: 18.8 K/UL (ref 1.7–7.7)
NEUTROPHILS RELATIVE PERCENT: 92.7 %
ORGANISM: ABNORMAL
PDW BLD-RTO: 15.4 % (ref 12.4–15.4)
PHOSPHORUS: 3.2 MG/DL (ref 2.5–4.9)
PLATELET # BLD: 356 K/UL (ref 135–450)
PMV BLD AUTO: 8.7 FL (ref 5–10.5)
POTASSIUM SERPL-SCNC: 4.1 MMOL/L (ref 3.5–5.1)
PROCALCITONIN: 2.96 NG/ML (ref 0–0.15)
RBC # BLD: 2.81 M/UL (ref 4.2–5.9)
SARS-COV-2, PCR: NOT DETECTED
SODIUM BLD-SCNC: 147 MMOL/L (ref 136–145)
STREP PNEUMONIAE ANTIGEN, URINE: NORMAL
VANCOMYCIN RANDOM: 21.9 UG/ML
WBC # BLD: 20.3 K/UL (ref 4–11)

## 2020-07-14 PROCEDURE — 6370000000 HC RX 637 (ALT 250 FOR IP): Performed by: PEDIATRICS

## 2020-07-14 PROCEDURE — 36415 COLL VENOUS BLD VENIPUNCTURE: CPT

## 2020-07-14 PROCEDURE — 80069 RENAL FUNCTION PANEL: CPT

## 2020-07-14 PROCEDURE — 86606 ASPERGILLUS ANTIBODY: CPT

## 2020-07-14 PROCEDURE — 85025 COMPLETE CBC W/AUTO DIFF WBC: CPT

## 2020-07-14 PROCEDURE — 84145 PROCALCITONIN (PCT): CPT

## 2020-07-14 PROCEDURE — 2060000000 HC ICU INTERMEDIATE R&B

## 2020-07-14 PROCEDURE — 99233 SBSQ HOSP IP/OBS HIGH 50: CPT | Performed by: INTERNAL MEDICINE

## 2020-07-14 PROCEDURE — 80202 ASSAY OF VANCOMYCIN: CPT

## 2020-07-14 PROCEDURE — 6360000002 HC RX W HCPCS: Performed by: INTERNAL MEDICINE

## 2020-07-14 PROCEDURE — 86635 COCCIDIOIDES ANTIBODY: CPT

## 2020-07-14 PROCEDURE — 86612 BLASTOMYCES ANTIBODY: CPT

## 2020-07-14 PROCEDURE — 2580000003 HC RX 258: Performed by: INTERNAL MEDICINE

## 2020-07-14 PROCEDURE — 2580000003 HC RX 258: Performed by: PEDIATRICS

## 2020-07-14 PROCEDURE — 6360000002 HC RX W HCPCS: Performed by: PEDIATRICS

## 2020-07-14 PROCEDURE — 86698 HISTOPLASMA ANTIBODY: CPT

## 2020-07-14 PROCEDURE — 87449 NOS EACH ORGANISM AG IA: CPT

## 2020-07-14 RX ORDER — METHYLPREDNISOLONE SODIUM SUCCINATE 40 MG/ML
40 INJECTION, POWDER, LYOPHILIZED, FOR SOLUTION INTRAMUSCULAR; INTRAVENOUS DAILY
Status: COMPLETED | OUTPATIENT
Start: 2020-07-15 | End: 2020-07-19

## 2020-07-14 RX ADMIN — AMIODARONE HYDROCHLORIDE 200 MG: 200 TABLET ORAL at 09:56

## 2020-07-14 RX ADMIN — METOPROLOL SUCCINATE 50 MG: 50 TABLET, EXTENDED RELEASE ORAL at 09:56

## 2020-07-14 RX ADMIN — HYDROCODONE BITARTRATE AND ACETAMINOPHEN 1 TABLET: 5; 325 TABLET ORAL at 21:29

## 2020-07-14 RX ADMIN — SODIUM CHLORIDE, PRESERVATIVE FREE 10 ML: 5 INJECTION INTRAVENOUS at 12:16

## 2020-07-14 RX ADMIN — ENOXAPARIN SODIUM 40 MG: 40 INJECTION SUBCUTANEOUS at 09:57

## 2020-07-14 RX ADMIN — GABAPENTIN 100 MG: 100 CAPSULE ORAL at 09:56

## 2020-07-14 RX ADMIN — GABAPENTIN 100 MG: 100 CAPSULE ORAL at 20:16

## 2020-07-14 RX ADMIN — TAMSULOSIN HYDROCHLORIDE 0.4 MG: 0.4 CAPSULE ORAL at 09:56

## 2020-07-14 RX ADMIN — ASPIRIN 81 MG: 81 TABLET, COATED ORAL at 09:56

## 2020-07-14 RX ADMIN — FUROSEMIDE 20 MG: 20 TABLET ORAL at 09:56

## 2020-07-14 RX ADMIN — AMITRIPTYLINE HYDROCHLORIDE 12.5 MG: 25 TABLET, FILM COATED ORAL at 20:16

## 2020-07-14 RX ADMIN — DEXAMETHASONE SODIUM PHOSPHATE 10 MG: 4 INJECTION, SOLUTION INTRAMUSCULAR; INTRAVENOUS at 09:56

## 2020-07-14 RX ADMIN — CEFEPIME HYDROCHLORIDE 2 G: 2 INJECTION, POWDER, FOR SOLUTION INTRAVENOUS at 17:00

## 2020-07-14 RX ADMIN — ASPIRIN 81 MG: 81 TABLET, COATED ORAL at 20:16

## 2020-07-14 RX ADMIN — DIVALPROEX SODIUM 250 MG: 125 CAPSULE ORAL at 20:16

## 2020-07-14 ASSESSMENT — PAIN DESCRIPTION - LOCATION
LOCATION: BACK
LOCATION: BACK

## 2020-07-14 ASSESSMENT — PAIN DESCRIPTION - FREQUENCY
FREQUENCY: INTERMITTENT
FREQUENCY: INTERMITTENT

## 2020-07-14 ASSESSMENT — PAIN SCALES - GENERAL
PAINLEVEL_OUTOF10: 0
PAINLEVEL_OUTOF10: 6
PAINLEVEL_OUTOF10: 7
PAINLEVEL_OUTOF10: 0
PAINLEVEL_OUTOF10: 5
PAINLEVEL_OUTOF10: 0
PAINLEVEL_OUTOF10: 0

## 2020-07-14 ASSESSMENT — PAIN DESCRIPTION - DESCRIPTORS
DESCRIPTORS: ACHING
DESCRIPTORS: ACHING

## 2020-07-14 ASSESSMENT — PAIN DESCRIPTION - PAIN TYPE
TYPE: CHRONIC PAIN
TYPE: CHRONIC PAIN

## 2020-07-14 ASSESSMENT — PAIN DESCRIPTION - PROGRESSION: CLINICAL_PROGRESSION: NOT CHANGED

## 2020-07-14 ASSESSMENT — PAIN DESCRIPTION - ORIENTATION
ORIENTATION: LOWER
ORIENTATION: LOWER

## 2020-07-14 ASSESSMENT — PAIN DESCRIPTION - ONSET
ONSET: ON-GOING
ONSET: ON-GOING

## 2020-07-14 NOTE — PLAN OF CARE
Fall risk assessment completed every shift. All precautions in place. Pt has call light within reach at all times. Room clear of clutter. Pt aware to call for assistance when getting up. Skin assessment completed every shift. Pt assessed for incontinence, appropriate barrier cream applied prn. Pt encouraged to turn/rotate every 2 hours. Assistance provided if pt unable to do so themselves. Educated patient/family on CHF signs/ symptoms, causes, treatments, limited fluid intake, daily weights, discharge medications, low sodium diet, activiety and follow-up. Pt to call physician if weight gain of 3 pounds in one day or 5 pounds in one week.

## 2020-07-14 NOTE — PROGRESS NOTES
Pulmonary Progress Note    CC: Acute hypoxic respiratory failure  Multifocal pneumonia  Pleural effusions  Abnormal chest CT    24 hr events:  Afebrile over the past 24 hours. On high flow oxygen. ROS:  Unable to obtain as patient does not answer questions      PHYSICAL EXAM:  Blood pressure (!) 149/70, pulse 76, temperature 97.8 °F (36.6 °C), temperature source Oral, resp. rate 18, height 5' 8\" (1.727 m), weight 174 lb 6.1 oz (79.1 kg), SpO2 100 %. on 6L NC    Intake/Output Summary (Last 24 hours) at 7/14/2020 1131  Last data filed at 7/13/2020 1304  Gross per 24 hour   Intake 0 ml   Output --   Net 0 ml       Gen: Well developed; well nourished  Eyes: No scleral icterus. No conjunctival injection. ENT: External appearance of ears and nose normal.  Neck: Trachea midline. No obvious mass. No visible thyroid enlargement    Respiratory:  Faint crackles over bilateral lower lung zones, no accessory muscle use  Cardiovascular: Regular rate and rhythm, no appreciable murmurs. No edema. Skin: Warm and dry. No rashes or ulcers on visible areas. Normal texture and turgor  Musculoskeletal: No cyanosis, clubbing or joint deformity.     Psychiatric: Normal mood and affect; appears to have poor insight and judgement       Medications:  Current Facility-Administered Medications: [START ON 7/15/2020] methylPREDNISolone sodium (SOLU-MEDROL) injection 40 mg, 40 mg, Intravenous, Daily  amiodarone (CORDARONE) tablet 200 mg, 200 mg, Oral, Daily  amitriptyline (ELAVIL) tablet 12.5 mg, 12.5 mg, Oral, Nightly  aspirin EC tablet 81 mg, 81 mg, Oral, BID  furosemide (LASIX) tablet 20 mg, 20 mg, Oral, Daily  divalproex (DEPAKOTE SPRINKLE) capsule 250 mg, 250 mg, Oral, Nightly  gabapentin (NEURONTIN) capsule 100 mg, 100 mg, Oral, BID  HYDROcodone-acetaminophen (NORCO) 5-325 MG per tablet 1 tablet, 1 tablet, Oral, Q4H PRN  metoprolol succinate (TOPROL XL) extended release tablet 50 mg, 50 mg, Oral, Daily  tamsulosin (FLOMAX) capsule 0.4 mg, 0.4 mg, Oral, Daily  sodium chloride flush 0.9 % injection 10 mL, 10 mL, Intravenous, 2 times per day  sodium chloride flush 0.9 % injection 10 mL, 10 mL, Intravenous, PRN  acetaminophen (TYLENOL) tablet 650 mg, 650 mg, Oral, Q6H PRN **OR** acetaminophen (TYLENOL) suppository 650 mg, 650 mg, Rectal, Q6H PRN  polyethylene glycol (GLYCOLAX) packet 17 g, 17 g, Oral, Daily PRN  promethazine (PHENERGAN) tablet 12.5 mg, 12.5 mg, Oral, Q6H PRN **OR** ondansetron (ZOFRAN) injection 4 mg, 4 mg, Intravenous, Q6H PRN  enoxaparin (LOVENOX) injection 40 mg, 40 mg, Subcutaneous, Daily  cefepime (MAXIPIME) 2 g IVPB minibag, 2 g, Intravenous, Q24H  vancomycin (VANCOCIN) intermittent dosing (placeholder), , Other, RX Placeholder    Data reviewed:  Labs:  CBC:   Recent Labs     07/12/20 2123   WBC 19.4*   HGB 10.0*   HCT 31.7*   MCV 96.5        BMP:   Recent Labs     07/12/20 2123   *   K 4.3      CO2 28   BUN 28*   CREATININE 1.5*     LIVER PROFILE:   Recent Labs     07/12/20 2123   AST 13*   ALT 7*   BILITOT 0.5   ALKPHOS 65     PT/INR:   Recent Labs     07/12/20 2123   PROTIME 13.7*   INR 1.18*     APTT:   Recent Labs     07/12/20 2123   APTT 30.3     Cultures:   Blood culture (7/12): NGTD  Nasal MRSA SA probe: Pending  Urine strep and legionella antigens: Pending      Films:  Chest images and reports were reviewed by me.  My interpretation is:  No new images      Assessment:     Acute hypoxic respiratory failure  Multifocal pneumonia  Pleural effusions  Abnormal chest CT       Plan:    Acute hypoxic respiratory failure  -Appears to be due to pneumonia +/- volume overload  -Wean supplemental oxygen as able to keep oxygen saturation greater than 90%     Multifocal pneumonia  -COVID PCR is negative  -Continue cefepime and vancomycin (day #2)  -Send fungal serologies  -Follow culture data     Pleural effusions  -Lasix daily  -Check Chem-7 in a.m.     Abnormal chest CT  -He has bilateral infiltrates and consolidation. Findings are likely due to pneumonia, but cannot rule out associated pulmonary edema.  -Continue cefepime and vancomycin pending culture data  -Continue Lasix daily      Patient is at high risk given the presence of respiratory failure requiring the use of high flow oxygen    Thank you for allowing me to participate in the care of this patient. Will follow.      Walter Garza MD  [facility] Pulmonary, Critical Care and Sleep

## 2020-07-14 NOTE — PROGRESS NOTES
PM assessment complete, VSS at this time. No acute S/S of distress noted. Pt tolerating O2 at this time. Will continue to monitor.

## 2020-07-14 NOTE — PROGRESS NOTES
Clinical Pharmacy Note  Vancomycin Consult    Peyton Henderson is a 80 y.o. male ordered Vancomycin for ; consult received from Dr. Dillon Galan to manage therapy. Also receiving cefepime . Patient Active Problem List   Diagnosis    Hypertension, benign    Osteopenia    Spinal stenosis    Mixed hyperlipidemia    Radial nerve injury    Hip fracture, left (HCC)    Arthritis of left knee    Trigger finger, acquired    Paroxysmal atrial fibrillation (HCC)    Frequent PVCs    Alcohol abuse    Hypokalemia    Syncope and collapse    Hyperlipidemia    PAF (paroxysmal atrial fibrillation) ([facility])    Essential hypertension    Macrocytic anemia    Closed fracture of ramus of left pubis (HCC)    Contusion of left hip    SVT (supraventricular tachycardia) ([facility])    Accidental fall    Closed head injury with concussion    Dementia ([facility])    Hyponatremia    Scalp laceration    Tachycardia    Closed nondisplaced fracture of proximal phalanx of left ring finger    Compression fracture of T12 vertebra ([facility])    Hyponatremia    Closed fracture of neck of right femur ([facility])    Closed fracture of right hip ([facility])    Acute respiratory failure with hypoxia ([facility])    COVID-19    Multifocal pneumonia    Pleural effusion, bilateral    Abnormal chest CT       Allergies:  Patient has no known allergies.      Temp max:  Temp (24hrs), Av.9 °F (36.6 °C), Min:97.6 °F (36.4 °C), Max:98.1 °F (36.7 °C)      Recent Labs     20  1222   WBC 19.4* 20.3*       Recent Labs     20  1222   BUN 28* 38*   CREATININE 1.5* 1.5*         Intake/Output Summary (Last 24 hours) at 2020 1441  Last data filed at 2020 1217  Gross per 24 hour   Intake 30 ml   Output --   Net 30 ml       Culture Results:      Ht Readings from Last 1 Encounters:   20 5' 8\" (1.727 m)        Wt Readings from Last 1 Encounters:   20 174 lb 6.1 oz (79.1 kg)         Estimated Creatinine Clearance: 34

## 2020-07-14 NOTE — PROGRESS NOTES
Hospitalist Progress Note      PCP: Gavin Villagomez MD    Date of Admission: 7/12/2020    Chief Complaint: SOB    Hospital Course: 80 y.o. male With medical history including atrial fibrillation, hypertension, hyperlipidemia presents with complaints of shortness of breath. Patient recently underwent surgical repair of right hip fracture on 7/6/2020. In the emergency department patient was found to be hypoxic requiring support with BiPAP to maintain adequate oxygenation. Laboratory work-up notable for marked leukocytosis. Imaging demonstrated multifocal opacities although these are seen on previous imaging    7/13  Patient CT scan shows bilateral pleural effusions along with bilateral opacities. Patient presented on 3 L oxygen saturating 88%. He required CPAP for a second and a nonrebreather and he is now on 6 L. Patient was febrile up to 103 which is since come down to 98. Is proBNP is actually lower than it was before, his creatinine is stable at 1.5. Patient's white count has gone up from 14-19. His troponin is also slightly elevated 0.04. Blood cultures have been drawn. His COVID-19 test returned negative. I have added on a another COVID-19 test but this time PCR. Pulmonology was also consulted    Subjective: Patient seen and examined. Patient remains stable on 6L NC. Still alert and answering questions. Awaiting AM labs. Broadened abx yesterday.        Medications:  Reviewed    Infusion Medications   Scheduled Medications    amiodarone  200 mg Oral Daily    amitriptyline  12.5 mg Oral Nightly    aspirin EC  81 mg Oral BID    furosemide  20 mg Oral Daily    divalproex  250 mg Oral Nightly    gabapentin  100 mg Oral BID    metoprolol succinate  50 mg Oral Daily    tamsulosin  0.4 mg Oral Daily    sodium chloride flush  10 mL Intravenous 2 times per day    enoxaparin  40 mg Subcutaneous Daily    dexamethasone  10 mg Intravenous Daily    cefepime  2 g Intravenous Q24H    vancomycin (VANCOCIN) intermittent dosing (placeholder)   Other RX Placeholder     PRN Meds: HYDROcodone-acetaminophen, sodium chloride flush, acetaminophen **OR** acetaminophen, polyethylene glycol, promethazine **OR** ondansetron      Intake/Output Summary (Last 24 hours) at 7/14/2020 0852  Last data filed at 7/13/2020 1304  Gross per 24 hour   Intake 0 ml   Output --   Net 0 ml       Physical Exam Performed:    BP (!) 149/70   Pulse 76   Temp 97.8 °F (36.6 °C) (Oral)   Resp 18   Ht 5' 8\" (1.727 m)   Wt 174 lb 6.1 oz (79.1 kg)   SpO2 100%   BMI 26.51 kg/m²     General appearance: No apparent distress, appears stated age and cooperative. HEENT: Pupils equal, round, and reactive to light. Conjunctivae/corneas clear. Neck: Supple, with full range of motion. No jugular venous distention. Trachea midline. Respiratory:  Normal respiratory effort. Diminished breath sounds bilateral   cardiovascular: Regular rate and rhythm with normal S1/S2   Abdomen: Soft, non-tender, non-distended with normal bowel sounds. Musculoskeletal: No clubbing, cyanosis or edema bilaterally. Skin: Skin color, texture, turgor normal.  No rashes or lesions. Neurologic:  Neurovascularly intact without any focal sensory/motor deficits.  Cranial nerves: II-XII intact, grossly non-focal.  Psychiatric: Alert and oriented  Capillary Refill: Brisk,< 3 seconds   Peripheral Pulses: +2 palpable, equal bilaterally       Labs:   Recent Labs     07/12/20 2123   WBC 19.4*   HGB 10.0*   HCT 31.7*        Recent Labs     07/12/20 2123   *   K 4.3      CO2 28   BUN 28*   CREATININE 1.5*   CALCIUM 8.6     Recent Labs     07/12/20 2123   AST 13*   ALT 7*   BILITOT 0.5   ALKPHOS 65     Recent Labs     07/12/20 2123   INR 1.18*     Recent Labs     07/12/20 2123   TROPONINI 0.04*       Urinalysis:      Lab Results   Component Value Date    NITRU Negative 07/01/2020    WBCUA 1 07/01/2020    RBCUA 1 07/01/2020    BLOODU Negative 07/01/2020 [place] 1.013 07/01/2020    GLUCOSEU Negative 07/01/2020       Radiology:  CT CHEST PULMONARY EMBOLISM W CONTRAST   Final Result   Negative for acute pulmonary embolism. Respiratory motion artifact limits   evaluation to the segmental level. Features of heart failure with moderate volume pleural effusions. Progressive multifocal airspace disease may in part represent asymmetric   pulmonary edema, however cannot exclude superimposed infection (including   atypical or viral pathogens) or ARDS. Enlarged branch main pulmonary arteries may be seen with pulmonary   hypertension. CT HEAD WO CONTRAST   Final Result   1. No acute intracranial abnormality. 2. Cerebral and cerebellar parenchymal volume loss with chronic microvascular   white matter ischemic disease. XR CHEST 1 VW   Final Result   No interval change in bilateral multifocal airspace opacities. Small right   pleural effusion. Assessment/Plan:    Sepsis  Temperature, heart rate, respirations, source lungs  Lactic acid normal  Hold on IV fluids  IV antibiotics given  Reorder COVID-19: neg    Acute respiratory failure with hypoxia  Titrate oxygen to keep saturations above 90%  Requiring nonrebreather and CPAP for a while  Down to 6 L  Pulmonology consulted    HCAP  Procalcitonin elevated  Cefepime and Vanc due to recent hospitalization  Recheck COVID-19 PCR also negative  Strep and Legionella ordered, MRSA nasal probe ordered  resp cult ordered, fungal cultures    Atrial fibrillation  Continue home amiodarone    Chronic Kidney Disease Stage 3  Stable  No need for further intervention  monitor    DVT Prophylaxis: lovenox  Diet: DIET GENERAL; Low Sodium (2 GM);  Daily Fluid Restriction: 2000 ml  Code Status: DNR-CCA    PT/OT Eval Status: Order    Israel Webb MD

## 2020-07-15 ENCOUNTER — APPOINTMENT (OUTPATIENT)
Dept: GENERAL RADIOLOGY | Age: 85
DRG: 871 | End: 2020-07-15
Payer: MEDICARE

## 2020-07-15 LAB
ALBUMIN SERPL-MCNC: 2.7 G/DL (ref 3.4–5)
ANION GAP SERPL CALCULATED.3IONS-SCNC: 12 MMOL/L (ref 3–16)
BASE EXCESS ARTERIAL: 4.5 MMOL/L (ref -3–3)
BASOPHILS ABSOLUTE: 0 K/UL (ref 0–0.2)
BASOPHILS RELATIVE PERCENT: 0.2 %
BUN BLDV-MCNC: 45 MG/DL (ref 7–20)
CALCIUM SERPL-MCNC: 8.8 MG/DL (ref 8.3–10.6)
CARBOXYHEMOGLOBIN ARTERIAL: 1.1 % (ref 0–1.5)
CHLORIDE BLD-SCNC: 110 MMOL/L (ref 99–110)
CO2: 26 MMOL/L (ref 21–32)
CREAT SERPL-MCNC: 1.6 MG/DL (ref 0.8–1.3)
EOSINOPHILS ABSOLUTE: 0 K/UL (ref 0–0.6)
EOSINOPHILS RELATIVE PERCENT: 0 %
GFR AFRICAN AMERICAN: 50
GFR NON-AFRICAN AMERICAN: 41
GLUCOSE BLD-MCNC: 129 MG/DL (ref 70–99)
GLUCOSE BLD-MCNC: 135 MG/DL (ref 70–99)
HCO3 ARTERIAL: 28.4 MMOL/L (ref 21–29)
HCT VFR BLD CALC: 28.5 % (ref 40.5–52.5)
HEMOGLOBIN, ART, EXTENDED: 12.4 G/DL (ref 13.5–17.5)
HEMOGLOBIN: 9 G/DL (ref 13.5–17.5)
LYMPHOCYTES ABSOLUTE: 0.5 K/UL (ref 1–5.1)
LYMPHOCYTES RELATIVE PERCENT: 2.5 %
MCH RBC QN AUTO: 30.5 PG (ref 26–34)
MCHC RBC AUTO-ENTMCNC: 31.6 G/DL (ref 31–36)
MCV RBC AUTO: 96.7 FL (ref 80–100)
METHEMOGLOBIN ARTERIAL: 0.6 %
MONOCYTES ABSOLUTE: 0.8 K/UL (ref 0–1.3)
MONOCYTES RELATIVE PERCENT: 4.1 %
NEUTROPHILS ABSOLUTE: 17.1 K/UL (ref 1.7–7.7)
NEUTROPHILS RELATIVE PERCENT: 93.2 %
O2 CONTENT ARTERIAL: 17 ML/DL
O2 SAT, ARTERIAL: 98.8 %
O2 THERAPY: ABNORMAL
PCO2 ARTERIAL: 38.7 MMHG (ref 35–45)
PDW BLD-RTO: 15.1 % (ref 12.4–15.4)
PERFORMED ON: ABNORMAL
PH ARTERIAL: 7.47 (ref 7.35–7.45)
PHOSPHORUS: 3.9 MG/DL (ref 2.5–4.9)
PLATELET # BLD: 367 K/UL (ref 135–450)
PMV BLD AUTO: 8.9 FL (ref 5–10.5)
PO2 ARTERIAL: 106 MMHG (ref 75–108)
POTASSIUM SERPL-SCNC: 4.3 MMOL/L (ref 3.5–5.1)
PROCALCITONIN: 2.07 NG/ML (ref 0–0.15)
RBC # BLD: 2.95 M/UL (ref 4.2–5.9)
SODIUM BLD-SCNC: 148 MMOL/L (ref 136–145)
TCO2 ARTERIAL: 29.6 MMOL/L
VANCOMYCIN RANDOM: 14.6 UG/ML
WBC # BLD: 18.4 K/UL (ref 4–11)

## 2020-07-15 PROCEDURE — 2580000003 HC RX 258: Performed by: INTERNAL MEDICINE

## 2020-07-15 PROCEDURE — 2060000000 HC ICU INTERMEDIATE R&B

## 2020-07-15 PROCEDURE — 99233 SBSQ HOSP IP/OBS HIGH 50: CPT | Performed by: INTERNAL MEDICINE

## 2020-07-15 PROCEDURE — 80069 RENAL FUNCTION PANEL: CPT

## 2020-07-15 PROCEDURE — 94660 CPAP INITIATION&MGMT: CPT

## 2020-07-15 PROCEDURE — 6360000002 HC RX W HCPCS: Performed by: PEDIATRICS

## 2020-07-15 PROCEDURE — 36600 WITHDRAWAL OF ARTERIAL BLOOD: CPT

## 2020-07-15 PROCEDURE — 85025 COMPLETE CBC W/AUTO DIFF WBC: CPT

## 2020-07-15 PROCEDURE — 2700000000 HC OXYGEN THERAPY PER DAY

## 2020-07-15 PROCEDURE — 94761 N-INVAS EAR/PLS OXIMETRY MLT: CPT

## 2020-07-15 PROCEDURE — 6370000000 HC RX 637 (ALT 250 FOR IP): Performed by: PEDIATRICS

## 2020-07-15 PROCEDURE — 80202 ASSAY OF VANCOMYCIN: CPT

## 2020-07-15 PROCEDURE — 84145 PROCALCITONIN (PCT): CPT

## 2020-07-15 PROCEDURE — 71045 X-RAY EXAM CHEST 1 VIEW: CPT

## 2020-07-15 PROCEDURE — 6370000000 HC RX 637 (ALT 250 FOR IP): Performed by: INTERNAL MEDICINE

## 2020-07-15 PROCEDURE — 6360000002 HC RX W HCPCS: Performed by: INTERNAL MEDICINE

## 2020-07-15 PROCEDURE — 82803 BLOOD GASES ANY COMBINATION: CPT

## 2020-07-15 PROCEDURE — 2580000003 HC RX 258: Performed by: PEDIATRICS

## 2020-07-15 RX ADMIN — FUROSEMIDE 20 MG: 20 TABLET ORAL at 09:04

## 2020-07-15 RX ADMIN — MUPIROCIN: 20 OINTMENT TOPICAL at 12:00

## 2020-07-15 RX ADMIN — AMIODARONE HYDROCHLORIDE 200 MG: 200 TABLET ORAL at 09:04

## 2020-07-15 RX ADMIN — ASPIRIN 81 MG: 81 TABLET, COATED ORAL at 09:04

## 2020-07-15 RX ADMIN — GABAPENTIN 100 MG: 100 CAPSULE ORAL at 09:04

## 2020-07-15 RX ADMIN — ENOXAPARIN SODIUM 40 MG: 40 INJECTION SUBCUTANEOUS at 09:20

## 2020-07-15 RX ADMIN — ASPIRIN 81 MG: 81 TABLET, COATED ORAL at 21:26

## 2020-07-15 RX ADMIN — GABAPENTIN 100 MG: 100 CAPSULE ORAL at 21:26

## 2020-07-15 RX ADMIN — DIVALPROEX SODIUM 250 MG: 125 CAPSULE ORAL at 21:26

## 2020-07-15 RX ADMIN — HYDROCODONE BITARTRATE AND ACETAMINOPHEN 1 TABLET: 5; 325 TABLET ORAL at 17:39

## 2020-07-15 RX ADMIN — CEFEPIME HYDROCHLORIDE 2 G: 2 INJECTION, POWDER, FOR SOLUTION INTRAVENOUS at 21:58

## 2020-07-15 RX ADMIN — METOPROLOL SUCCINATE 50 MG: 50 TABLET, EXTENDED RELEASE ORAL at 09:04

## 2020-07-15 RX ADMIN — VANCOMYCIN HYDROCHLORIDE 1250 MG: 10 INJECTION, POWDER, LYOPHILIZED, FOR SOLUTION INTRAVENOUS at 09:23

## 2020-07-15 RX ADMIN — TAMSULOSIN HYDROCHLORIDE 0.4 MG: 0.4 CAPSULE ORAL at 09:04

## 2020-07-15 RX ADMIN — METHYLPREDNISOLONE SODIUM SUCCINATE 40 MG: 40 INJECTION, POWDER, FOR SOLUTION INTRAMUSCULAR; INTRAVENOUS at 09:21

## 2020-07-15 RX ADMIN — SODIUM CHLORIDE, PRESERVATIVE FREE 10 ML: 5 INJECTION INTRAVENOUS at 09:24

## 2020-07-15 RX ADMIN — SODIUM CHLORIDE, PRESERVATIVE FREE 10 ML: 5 INJECTION INTRAVENOUS at 21:58

## 2020-07-15 RX ADMIN — MUPIROCIN: 20 OINTMENT TOPICAL at 21:32

## 2020-07-15 RX ADMIN — AMITRIPTYLINE HYDROCHLORIDE 12.5 MG: 25 TABLET, FILM COATED ORAL at 21:26

## 2020-07-15 ASSESSMENT — PAIN SCALES - GENERAL
PAINLEVEL_OUTOF10: 0
PAINLEVEL_OUTOF10: 8
PAINLEVEL_OUTOF10: 0

## 2020-07-15 NOTE — PROGRESS NOTES
Clinical Pharmacy Note  Vancomycin Consult    Hernando Frederick is a 80 y.o. male ordered Vancomycin for ; consult received from Dr. Radha Mclean to manage therapy. Also receiving cefepime . Patient Active Problem List   Diagnosis    Hypertension, benign    Osteopenia    Spinal stenosis    Mixed hyperlipidemia    Radial nerve injury    Hip fracture, left (HCC)    Arthritis of left knee    Trigger finger, acquired    Paroxysmal atrial fibrillation (HCC)    Frequent PVCs    Alcohol abuse    Hypokalemia    Syncope and collapse    Hyperlipidemia    [facility]ox[facility] fibrillation) [facility])    Essential hypertension    Macrocytic anemia    Closed fracture of ramus of left pubis ([facility])    Contusion of left hip    SVT (supraventricular tachycardia) ([facility])    Accidental fall    Closed head injury with concussion    Dementia ([facility])    Hyponatremia    Scalp laceration    Tachycardia    Closed nondisplaced fracture of proximal phalanx of left ring finger    Compression fracture of T12 vertebra ([facility])    Hyponatremia    Closed fracture of neck of right femur ([facility])    Closed fracture of right hip ([facility])    Acute respiratory failure with hypoxia ([facility])    COVID-19    Multifocal pneumonia    Pleural effusion, bilateral    Abnormal chest CT       Allergies:  Patient has no known allergies.      Temp max:  Temp (24hrs), Av.7 °F (36.5 °C), Min:97.5 °F (36.4 °C), Max:98 °F (36.7 °C)      Recent Labs     20  1222 07/15/20  0559   WBC 19.4* 20.3* 18.4*       Recent Labs     20  1222 07/15/20  0559   BUN 28* 38* 45*   CREATININE 1.5* 1.5* 1.6*         Intake/Output Summary (Last 24 hours) at 7/15/2020 0747  Last data filed at 2020 1534  Gross per 24 hour   Intake 30 ml   Output 200 ml   Net -170 ml       Culture Results:      Ht Readings from Last 1 Encounters:   20 5' 8\" (1.727 m)        Wt Readings from Last 1 Encounters:   07/15/20 175 lb 0.7 oz (79.4 kg)         Estimated Creatinine Clearance: 31 mL/min (A) (based on SCr of 1.6 mg/dL (H)). Assessment/Plan:  Patient rec'd no vanco yesterday  Random vanco level = 14.6 mg/L.today at 0600  Scr  at 1.6 mg/dL  Will give vanco 1250mg x1  today and check a random vanco level tomorrow am.   Patient has a + mrsa nasal swab  Thank you for the consult.      Electronically signed by Dexter Ivy, [address] [place] [address] on 7/15/2020 at 7:49 AM

## 2020-07-15 NOTE — PROGRESS NOTES
Hospitalist Progress Note      PCP: Amandeep Conrad MD    Date of Admission: 7/12/2020    Chief Complaint: SOB    Hospital Course: 80 y.o. male With medical history including atrial fibrillation, hypertension, hyperlipidemia presents with complaints of shortness of breath. Patient recently underwent surgical repair of right hip fracture on 7/6/2020. In the emergency department patient was found to be hypoxic requiring support with BiPAP to maintain adequate oxygenation. Laboratory work-up notable for marked leukocytosis. Imaging demonstrated multifocal opacities although these are seen on previous imaging    7/13  Patient CT scan shows bilateral pleural effusions along with bilateral opacities. Patient presented on 3 L oxygen saturating 88%. He required CPAP for a second and a nonrebreather and he is now on 6 L. Patient was febrile up to 103 which is since come down to 98. Is proBNP is actually lower than it was before, his creatinine is stable at 1.5. Patient's white count has gone up from 14-19. His troponin is also slightly elevated 0.04. Blood cultures have been drawn. His COVID-19 test returned negative. I have added on a another COVID-19 test but this time PCR. Pulmonology was also consulted    7/14 Patient remains stable on 6L NC. Still alert and answering questions. Awaiting AM labs. Broadened abx yesterday. Subjective: Patient seen and examined. Patient appears to be more lethargic today than he was yesterday. Patient's creatinine remaining stable along with his sodium level.   Procalcitonin is trending down with IV antibiotics      Medications:  Reviewed    Infusion Medications   Scheduled Medications    vancomycin  1,250 mg Intravenous Once    methylPREDNISolone  40 mg Intravenous Daily    amiodarone  200 mg Oral Daily    amitriptyline  12.5 mg Oral Nightly    aspirin EC  81 mg Oral BID    furosemide  20 mg Oral Daily    divalproex  250 mg Oral Nightly    gabapentin  100 mg Oral BID    metoprolol succinate  50 mg Oral Daily    tamsulosin  0.4 mg Oral Daily    sodium chloride flush  10 mL Intravenous 2 times per day    enoxaparin  40 mg Subcutaneous Daily    cefepime  2 g Intravenous Q24H    vancomycin (VANCOCIN) intermittent dosing (placeholder)   Other RX Placeholder     PRN Meds: HYDROcodone-acetaminophen, sodium chloride flush, acetaminophen **OR** acetaminophen, polyethylene glycol, promethazine **OR** ondansetron      Intake/Output Summary (Last 24 hours) at 7/15/2020 0927  Last data filed at 7/15/2020 0926  Gross per 24 hour   Intake 150 ml   Output 325 ml   Net -175 ml       Physical Exam Performed:    BP (!) 163/68   Pulse 74   Temp 97.5 °F (36.4 °C) (Oral)   Resp 18   Ht 5' 8\" (1.727 m)   Wt 175 lb 0.7 oz (79.4 kg)   SpO2 93%   BMI 26.62 kg/m²     General appearance: No apparent distress, appears stated age and cooperative. HEENT: Pupils equal, round, and reactive to light. Conjunctivae/corneas clear. Neck: Supple, with full range of motion. No jugular venous distention. Trachea midline. Respiratory:  Normal respiratory effort. Diminished breath sounds bilateral   cardiovascular: Regular rate and rhythm with normal S1/S2   Abdomen: Soft, non-tender, non-distended with normal bowel sounds. Musculoskeletal: No clubbing, cyanosis or edema bilaterally. Skin: Skin color, texture, turgor normal.  No rashes or lesions. Neurologic:  Neurovascularly intact without any focal sensory/motor deficits.  Cranial nerves: II-XII intact, grossly non-focal.  Psychiatric: Alert  Capillary Refill: Brisk,< 3 seconds   Peripheral Pulses: +2 palpable, equal bilaterally       Labs:   Recent Labs     07/12/20 2123 07/14/20  1222 07/15/20  0559   WBC 19.4* 20.3* 18.4*   HGB 10.0* 8.7* 9.0*   HCT 31.7* 27.3* 28.5*    356 367     Recent Labs     07/12/20 2123 07/14/20  1222 07/15/20  0559   * 147* 148*   K 4.3 4.1 4.3    108 110   CO2 28 29 26   BUN 28* 38* 45* CREATININE 1.5* 1.5* 1.6*   CALCIUM 8.6 8.8 8.8   PHOS  --  3.2 3.9     Recent Labs     07/12/20 2123   AST 13*   ALT 7*   BILITOT 0.5   ALKPHOS 65     Recent Labs     07/12/20 2123   INR 1.18*     Recent Labs     07/12/20 2123   TROPONINI 0.04*       Urinalysis:      Lab Results   Component Value Date    NITRU Negative 07/01/2020    WBCUA 1 07/01/2020    RBCUA 1 07/01/2020    BLOODU Negative 07/01/2020    SPECGRAV 1.013 07/01/2020    GLUCOSEU Negative 07/01/2020       Radiology:  CT CHEST PULMONARY EMBOLISM W CONTRAST   Final Result   Negative for acute pulmonary embolism. Respiratory motion artifact limits   evaluation to the segmental level. Features of heart failure with moderate volume pleural effusions. Progressive multifocal airspace disease may in part represent asymmetric   pulmonary edema, however cannot exclude superimposed infection (including   atypical or viral pathogens) or ARDS. Enlarged branch main pulmonary arteries may be seen with pulmonary   hypertension. CT HEAD WO CONTRAST   Final Result   1. No acute intracranial abnormality. 2. Cerebral and cerebellar parenchymal volume loss with chronic microvascular   white matter ischemic disease. XR CHEST 1 VW   Final Result   No interval change in bilateral multifocal airspace opacities. Small right   pleural effusion.                  Assessment/Plan:    Sepsis  Temperature, heart rate, respirations, source lungs  Lactic acid normal  Hold on IV fluids  Vancomycin and cefepime  Reorder COVID-19: neg    Acute respiratory failure with hypoxia  Titrate oxygen to keep saturations above 90%  Requiring nonrebreather and CPAP for a while  Down to 3 L  Pulmonology following    HCAP  Procalcitonin elevated  Cefepime and Vanc due to recent hospitalization  Recheck COVID-19 PCR also negative  Strep and Legionella negative, MRSA nasal probe positive  resp cult ordered, fungal cultures    Atrial fibrillation  Continue home amiodarone    Chronic Kidney Disease Stage 3  Stable  No need for further intervention  monitor    DVT Prophylaxis: lovenox  Diet: DIET GENERAL; Low Sodium (2 GM);  Daily Fluid Restriction: 2000 ml  Code Status: DNR-CCA    PT/OT Eval Status: Cee Barrett MD

## 2020-07-15 NOTE — PROGRESS NOTES
Pulmonary Progress Note    CC: Acute hypoxic respiratory failure  Multifocal pneumonia  Pleural effusions  Abnormal chest CT    24 hr events:  He remains on high flow oxygen. Afebrile. ROS:  Unable to obtain as patient does not answer questions      PHYSICAL EXAM:  Blood pressure (!) 163/68, pulse 74, temperature 97.5 °F (36.4 °C), temperature source Oral, resp. rate 18, height 5' 8\" (1.727 m), weight 175 lb 0.7 oz (79.4 kg), SpO2 93 %. on 6L NC    Intake/Output Summary (Last 24 hours) at 7/15/2020 0959  Last data filed at 7/15/2020 0926  Gross per 24 hour   Intake 150 ml   Output 325 ml   Net -175 ml       Gen: Well developed; well nourished  Eyes: No scleral icterus. No conjunctival injection. ENT: External appearance of ears and nose normal.  Neck: Trachea midline. No obvious mass. No visible thyroid enlargement    Respiratory:  Faint crackles over bilateral lower lung zones, no accessory muscle use  Cardiovascular: Regular rate and rhythm, no appreciable murmurs. No edema. Skin: Warm and dry. No rashes or ulcers on visible areas. Normal texture and turgor  Musculoskeletal: No cyanosis, clubbing or joint deformity.     Psychiatric: Normal mood and affect; appears to have poor insight and judgement       Medications:  Current Facility-Administered Medications: vancomycin (VANCOCIN) 1250 mg in dextrose 5 % 250 mL IVPB, 1,250 mg, Intravenous, Once  methylPREDNISolone sodium (SOLU-MEDROL) injection 40 mg, 40 mg, Intravenous, Daily  amiodarone (CORDARONE) tablet 200 mg, 200 mg, Oral, Daily  amitriptyline (ELAVIL) tablet 12.5 mg, 12.5 mg, Oral, Nightly  aspirin EC tablet 81 mg, 81 mg, Oral, BID  furosemide (LASIX) tablet 20 mg, 20 mg, Oral, Daily  divalproex (DEPAKOTE SPRINKLE) capsule 250 mg, 250 mg, Oral, Nightly  gabapentin (NEURONTIN) capsule 100 mg, 100 mg, Oral, BID  HYDROcodone-acetaminophen (NORCO) 5-325 MG per tablet 1 tablet, 1 tablet, Oral, Q4H PRN  metoprolol succinate (TOPROL XL) extended release tablet 50 mg, 50 mg, Oral, Daily  tamsulosin (FLOMAX) capsule 0.4 mg, 0.4 mg, Oral, Daily  sodium chloride flush 0.9 % injection 10 mL, 10 mL, Intravenous, 2 times per day  sodium chloride flush 0.9 % injection 10 mL, 10 mL, Intravenous, PRN  acetaminophen (TYLENOL) tablet 650 mg, 650 mg, Oral, Q6H PRN **OR** acetaminophen (TYLENOL) suppository 650 mg, 650 mg, Rectal, Q6H PRN  polyethylene glycol (GLYCOLAX) packet 17 g, 17 g, Oral, Daily PRN  promethazine (PHENERGAN) tablet 12.5 mg, 12.5 mg, Oral, Q6H PRN **OR** ondansetron (ZOFRAN) injection 4 mg, 4 mg, Intravenous, Q6H PRN  enoxaparin (LOVENOX) injection 40 mg, 40 mg, Subcutaneous, Daily  cefepime (MAXIPIME) 2 g IVPB minibag, 2 g, Intravenous, Q24H  vancomycin (VANCOCIN) intermittent dosing (placeholder), , Other, RX Placeholder    Data reviewed:  Labs:  CBC:   Recent Labs     07/12/20 2123 07/14/20 1222 07/15/20  0559   WBC 19.4* 20.3* 18.4*   HGB 10.0* 8.7* 9.0*   HCT 31.7* 27.3* 28.5*   MCV 96.5 96.9 96.7    356 367     BMP:   Recent Labs     07/12/20 2123 07/14/20  1222 07/15/20  0559   * 147* 148*   K 4.3 4.1 4.3    108 110   CO2 28 29 26   PHOS  --  3.2 3.9   BUN 28* 38* 45*   CREATININE 1.5* 1.5* 1.6*     LIVER PROFILE:   Recent Labs     07/12/20 2123   AST 13*   ALT 7*   BILITOT 0.5   ALKPHOS 65     PT/INR:   Recent Labs     07/12/20 2123   PROTIME 13.7*   INR 1.18*     APTT:   Recent Labs     07/12/20 2123   APTT 30.3     Cultures:   Blood culture (7/12): NGTD  Nasal MRSA SA probe: Positive  Urine strep and legionella antigens: Negative      Films:  Chest images and reports were reviewed by me.  My interpretation is:  No new images      Assessment:     Acute hypoxic respiratory failure  Multifocal pneumonia  Pleural effusions  Abnormal chest CT       Plan:    Acute hypoxic respiratory failure  -Appears to be due to pneumonia +/- volume overload  -Wean supplemental oxygen as able to keep oxygen saturation greater than 90%     Multifocal pneumonia  -COVID PCR is negative  -Continue cefepime and vancomycin (day #3)  -Bactroban to the nares  -Follow up fungal serologies  -Check CXR in a.m.     Pleural effusions  -Lasix daily     Abnormal chest CT  -He has bilateral infiltrates and consolidation. Findings are likely due to pneumonia, but cannot rule out associated pulmonary edema.  -Continue cefepime and vancomycin (day #3)  -Continue Lasix daily      Patient is at high risk given the presence of respiratory failure requiring the use of high flow oxygen    Thank you for allowing me to participate in the care of this patient. Will follow.      Laura Mane MD  [facility] Pulmonary, Critical Care and Sleep

## 2020-07-15 NOTE — CARE COORDINATION
Sw call to Devan at [facility] to inform of possible dc on Fri-Sat. Pt will need [facility] precert to return to SNF.      Electronically signed by DINA Lewis, APOLINAR on 7/15/2020 at 11:57 AM

## 2020-07-16 ENCOUNTER — APPOINTMENT (OUTPATIENT)
Dept: GENERAL RADIOLOGY | Age: 85
DRG: 871 | End: 2020-07-16
Payer: MEDICARE

## 2020-07-16 LAB
ALBUMIN SERPL-MCNC: 2.5 G/DL (ref 3.4–5)
ANION GAP SERPL CALCULATED.3IONS-SCNC: 12 MMOL/L (ref 3–16)
ANISOCYTOSIS: ABNORMAL
BASOPHILS ABSOLUTE: 0 K/UL (ref 0–0.2)
BASOPHILS RELATIVE PERCENT: 0 %
BLOOD CULTURE, ROUTINE: NORMAL
BUN BLDV-MCNC: 45 MG/DL (ref 7–20)
CALCIUM SERPL-MCNC: 8.2 MG/DL (ref 8.3–10.6)
CHLORIDE BLD-SCNC: 111 MMOL/L (ref 99–110)
CO2: 22 MMOL/L (ref 21–32)
CREAT SERPL-MCNC: 1.4 MG/DL (ref 0.8–1.3)
EOSINOPHILS ABSOLUTE: 0 K/UL (ref 0–0.6)
EOSINOPHILS RELATIVE PERCENT: 0 %
GFR AFRICAN AMERICAN: 58
GFR NON-AFRICAN AMERICAN: 48
GLUCOSE BLD-MCNC: 126 MG/DL (ref 70–99)
HCT VFR BLD CALC: 33.5 % (ref 40.5–52.5)
HEMOGLOBIN: 10.2 G/DL (ref 13.5–17.5)
LYMPHOCYTES ABSOLUTE: 0.2 K/UL (ref 1–5.1)
LYMPHOCYTES RELATIVE PERCENT: 1 %
MCH RBC QN AUTO: 30.6 PG (ref 26–34)
MCHC RBC AUTO-ENTMCNC: 30.3 G/DL (ref 31–36)
MCV RBC AUTO: 100.9 FL (ref 80–100)
MONOCYTES ABSOLUTE: 0.2 K/UL (ref 0–1.3)
MONOCYTES RELATIVE PERCENT: 1 %
NEUTROPHILS ABSOLUTE: 22.5 K/UL (ref 1.7–7.7)
NEUTROPHILS RELATIVE PERCENT: 98 %
PDW BLD-RTO: 15.7 % (ref 12.4–15.4)
PHOSPHORUS: 3.4 MG/DL (ref 2.5–4.9)
PLATELET # BLD: 355 K/UL (ref 135–450)
PMV BLD AUTO: 8.7 FL (ref 5–10.5)
POTASSIUM SERPL-SCNC: 4.7 MMOL/L (ref 3.5–5.1)
PROCALCITONIN: 0.94 NG/ML (ref 0–0.15)
RBC # BLD: 3.32 M/UL (ref 4.2–5.9)
REASON FOR REJECTION: NORMAL
REJECTED TEST: NORMAL
SODIUM BLD-SCNC: 145 MMOL/L (ref 136–145)
VANCOMYCIN RANDOM: 18.2 UG/ML
VANCOMYCIN RANDOM: 19.8 UG/ML
WBC # BLD: 23 K/UL (ref 4–11)

## 2020-07-16 PROCEDURE — 71045 X-RAY EXAM CHEST 1 VIEW: CPT

## 2020-07-16 PROCEDURE — 85025 COMPLETE CBC W/AUTO DIFF WBC: CPT

## 2020-07-16 PROCEDURE — 6370000000 HC RX 637 (ALT 250 FOR IP): Performed by: NURSE PRACTITIONER

## 2020-07-16 PROCEDURE — 36415 COLL VENOUS BLD VENIPUNCTURE: CPT

## 2020-07-16 PROCEDURE — 99233 SBSQ HOSP IP/OBS HIGH 50: CPT | Performed by: INTERNAL MEDICINE

## 2020-07-16 PROCEDURE — 2060000000 HC ICU INTERMEDIATE R&B

## 2020-07-16 PROCEDURE — 84145 PROCALCITONIN (PCT): CPT

## 2020-07-16 PROCEDURE — 94761 N-INVAS EAR/PLS OXIMETRY MLT: CPT

## 2020-07-16 PROCEDURE — 6360000002 HC RX W HCPCS: Performed by: PEDIATRICS

## 2020-07-16 PROCEDURE — 2580000003 HC RX 258: Performed by: INTERNAL MEDICINE

## 2020-07-16 PROCEDURE — 6360000002 HC RX W HCPCS: Performed by: INTERNAL MEDICINE

## 2020-07-16 PROCEDURE — 80069 RENAL FUNCTION PANEL: CPT

## 2020-07-16 PROCEDURE — 6370000000 HC RX 637 (ALT 250 FOR IP): Performed by: PEDIATRICS

## 2020-07-16 PROCEDURE — 2700000000 HC OXYGEN THERAPY PER DAY

## 2020-07-16 PROCEDURE — 80202 ASSAY OF VANCOMYCIN: CPT

## 2020-07-16 PROCEDURE — 2580000003 HC RX 258: Performed by: PEDIATRICS

## 2020-07-16 RX ORDER — LANOLIN ALCOHOL/MO/W.PET/CERES
3 CREAM (GRAM) TOPICAL NIGHTLY PRN
Status: DISCONTINUED | OUTPATIENT
Start: 2020-07-16 | End: 2020-07-21 | Stop reason: HOSPADM

## 2020-07-16 RX ORDER — FUROSEMIDE 10 MG/ML
40 INJECTION INTRAMUSCULAR; INTRAVENOUS ONCE
Status: COMPLETED | OUTPATIENT
Start: 2020-07-16 | End: 2020-07-16

## 2020-07-16 RX ADMIN — VANCOMYCIN HYDROCHLORIDE 1000 MG: 1 INJECTION, POWDER, LYOPHILIZED, FOR SOLUTION INTRAVENOUS at 12:30

## 2020-07-16 RX ADMIN — GABAPENTIN 100 MG: 100 CAPSULE ORAL at 09:07

## 2020-07-16 RX ADMIN — SODIUM CHLORIDE, PRESERVATIVE FREE 10 ML: 5 INJECTION INTRAVENOUS at 21:13

## 2020-07-16 RX ADMIN — METOPROLOL SUCCINATE 50 MG: 50 TABLET, EXTENDED RELEASE ORAL at 09:07

## 2020-07-16 RX ADMIN — MUPIROCIN: 20 OINTMENT TOPICAL at 23:04

## 2020-07-16 RX ADMIN — TAMSULOSIN HYDROCHLORIDE 0.4 MG: 0.4 CAPSULE ORAL at 09:07

## 2020-07-16 RX ADMIN — SODIUM CHLORIDE, PRESERVATIVE FREE 10 ML: 5 INJECTION INTRAVENOUS at 09:07

## 2020-07-16 RX ADMIN — FUROSEMIDE 20 MG: 20 TABLET ORAL at 09:07

## 2020-07-16 RX ADMIN — GABAPENTIN 100 MG: 100 CAPSULE ORAL at 21:09

## 2020-07-16 RX ADMIN — ASPIRIN 81 MG: 81 TABLET, COATED ORAL at 21:09

## 2020-07-16 RX ADMIN — CEFEPIME HYDROCHLORIDE 2 G: 2 INJECTION, POWDER, FOR SOLUTION INTRAVENOUS at 16:11

## 2020-07-16 RX ADMIN — METHYLPREDNISOLONE SODIUM SUCCINATE 40 MG: 40 INJECTION, POWDER, FOR SOLUTION INTRAMUSCULAR; INTRAVENOUS at 09:06

## 2020-07-16 RX ADMIN — DIVALPROEX SODIUM 250 MG: 125 CAPSULE ORAL at 21:09

## 2020-07-16 RX ADMIN — MUPIROCIN: 20 OINTMENT TOPICAL at 09:07

## 2020-07-16 RX ADMIN — ASPIRIN 81 MG: 81 TABLET, COATED ORAL at 09:07

## 2020-07-16 RX ADMIN — MELATONIN 3 MG: at 21:09

## 2020-07-16 RX ADMIN — AMITRIPTYLINE HYDROCHLORIDE 12.5 MG: 25 TABLET, FILM COATED ORAL at 21:09

## 2020-07-16 RX ADMIN — ENOXAPARIN SODIUM 40 MG: 40 INJECTION SUBCUTANEOUS at 09:07

## 2020-07-16 RX ADMIN — FUROSEMIDE 40 MG: 10 INJECTION, SOLUTION INTRAMUSCULAR; INTRAVENOUS at 12:32

## 2020-07-16 RX ADMIN — AMIODARONE HYDROCHLORIDE 200 MG: 200 TABLET ORAL at 09:07

## 2020-07-16 ASSESSMENT — PAIN SCALES - WONG BAKER
WONGBAKER_NUMERICALRESPONSE: 0
WONGBAKER_NUMERICALRESPONSE: 0

## 2020-07-16 ASSESSMENT — PAIN SCALES - GENERAL
PAINLEVEL_OUTOF10: 0

## 2020-07-16 NOTE — PROGRESS NOTES
Patient ripped off BiPAP mask and removed pulse oximeter from finger. Placed patient back on 15L HFNC. Will continue to wean as tolerated.  Electronically signed by Nya Yanez RCP on 7/15/2020 at 11:47 PM

## 2020-07-16 NOTE — PROGRESS NOTES
Pulmonary Progress Note    CC: Acute hypoxic respiratory failure  Multifocal pneumonia  Pleural effusions  Abnormal chest CT    24 hr events:  He became hypoxic to 80% overnight. Supplemental oxygen was increased from 3 L to 15 L and then BiPAP. He was transitioned back to 15 L of oxygen. ROS:  Unable to obtain as patient does not answer questions    PHYSICAL EXAM:  Blood pressure (!) 159/66, pulse 72, temperature 97.7 °F (36.5 °C), temperature source Oral, resp. rate 20, height 5' 8\" (1.727 m), weight 168 lb 6.9 oz (76.4 kg), SpO2 95 %. on 6L NC    Intake/Output Summary (Last 24 hours) at 7/16/2020 1318  Last data filed at 7/16/2020 0910  Gross per 24 hour   Intake 160 ml   Output 225 ml   Net -65 ml       Gen: Well developed; well nourished  Eyes: No scleral icterus. No conjunctival injection. ENT: External appearance of ears and nose normal.  Neck: Trachea midline. No obvious mass. No visible thyroid enlargement    Respiratory:  Crackles bilaterally, no accessory muscle use  Cardiovascular: Regular rate and rhythm, no appreciable murmurs. No edema. Skin: Warm and dry. No rashes or ulcers on visible areas. Normal texture and turgor  Musculoskeletal: No cyanosis, clubbing or joint deformity.     Psychiatric: Normal mood and affect; appears to have poor insight and judgement       Medications:  Current Facility-Administered Medications: vancomycin 1000 mg IVPB in 250 mL D5W addavial, 1,000 mg, Intravenous, Once  mupirocin (BACTROBAN) 2 % ointment, , Topical, BID  methylPREDNISolone sodium (SOLU-MEDROL) injection 40 mg, 40 mg, Intravenous, Daily  amiodarone (CORDARONE) tablet 200 mg, 200 mg, Oral, Daily  amitriptyline (ELAVIL) tablet 12.5 mg, 12.5 mg, Oral, Nightly  aspirin EC tablet 81 mg, 81 mg, Oral, BID  divalproex (DEPAKOTE SPRINKLE) capsule 250 mg, 250 mg, Oral, Nightly  gabapentin (NEURONTIN) capsule 100 mg, 100 mg, Oral, BID  HYDROcodone-acetaminophen (NORCO) 5-325 MG per tablet 1 tablet, 1 tablet, effusions  Abnormal chest CT       Plan:    Acute hypoxic respiratory failure  -Appears to be due to pneumonia  and likely volume overload  -Wean supplemental oxygen as able to keep oxygen saturation greater than 90%     Multifocal pneumonia  -COVID PCR is negative  -Continue cefepime and vancomycin (day #4)  -Bactroban to the nares  -Follow up fungal serologies  -Daily procalcitonin     Pleural effusions  -Lasix 40 mg IV x1     Abnormal chest CT  -He has bilateral infiltrates and consolidation which seem to represent a combination of pneumonia and pulmonary edema  -Continue cefepime and vancomycin (day #4)  -Lasix 40 mg IV x1      Patient is at high risk given the presence of respiratory failure requiring the use of high flow oxygen    Thank you for allowing me to participate in the care of this patient. Will follow.      Sohan Gaxiola MD  [facility] Pulmonary, Critical Care and Sleep

## 2020-07-16 NOTE — PROGRESS NOTES
Hospitalist Progress Note      PCP: Zuri Marsh MD    Date of Admission: 7/12/2020    Chief Complaint: SOB    Hospital Course: 80 y.o. male With medical history including atrial fibrillation, hypertension, hyperlipidemia presents with complaints of shortness of breath. Patient recently underwent surgical repair of right hip fracture on 7/6/2020. In the emergency department patient was found to be hypoxic requiring support with BiPAP to maintain adequate oxygenation. Laboratory work-up notable for marked leukocytosis. Imaging demonstrated multifocal opacities although these are seen on previous imaging    7/13  Patient CT scan shows bilateral pleural effusions along with bilateral opacities. Patient presented on 3 L oxygen saturating 88%. He required CPAP for a second and a nonrebreather and he is now on 6 L. Patient was febrile up to 103 which is since come down to 98. Is proBNP is actually lower than it was before, his creatinine is stable at 1.5. Patient's white count has gone up from 14-19. His troponin is also slightly elevated 0.04. Blood cultures have been drawn. His COVID-19 test returned negative. I have added on a another COVID-19 test but this time PCR. Pulmonology was also consulted    7/14 Patient remains stable on 6L NC. Still alert and answering questions. Awaiting AM labs. Broadened abx yesterday. 7/15  Patient appears to be more lethargic today than he was yesterday. Patient's creatinine remaining stable along with his sodium level. Procalcitonin is trending down with IV antibiotics    Subjective: Patient seen and examined. Oxygen demand fluctuates. Overnight up to 15L but now he is on 4L with no intervention. No acute distress. CXR with worsening pulm edema. Will discuss with Pulm giving some lasix.      Medications:  Reviewed    Infusion Medications   Scheduled Medications    mupirocin   Topical BID    methylPREDNISolone  40 mg Intravenous Daily    amiodarone  200 07/14/20  1222 07/15/20  0559 07/16/20  0639   * 148* 145   K 4.1 4.3 4.7    110 111*   CO2 29 26 22   BUN 38* 45* 45*   CREATININE 1.5* 1.6* 1.4*   CALCIUM 8.8 8.8 8.2*   PHOS 3.2 3.9 3.4     No results for input(s): AST, ALT, BILIDIR, BILITOT, ALKPHOS in the last 72 hours. No results for input(s): INR in the last 72 hours. No results for input(s): Finn Edmondsaw in the last 72 hours. Urinalysis:      Lab Results   Component Value Date    NITRU Negative 07/01/2020    WBCUA 1 07/01/2020    RBCUA 1 07/01/2020    BLOODU Negative 07/01/2020    SPECGRAV 1.013 07/01/2020    GLUCOSEU Negative 07/01/2020       Radiology:  XR CHEST PORTABLE   Preliminary Result   1. Worsening bilateral lung infiltrates with associated bilateral pleural   effusions. XR CHEST PORTABLE   Final Result   Continued bilateral pleural effusions and bilateral airspace disease, with   shifting areas of consolidation. This suggests at least a component of   pulmonary edema. Pneumonia and ARDS remain differential possibilities. CT CHEST PULMONARY EMBOLISM W CONTRAST   Final Result   Negative for acute pulmonary embolism. Respiratory motion artifact limits   evaluation to the segmental level. Features of heart failure with moderate volume pleural effusions. Progressive multifocal airspace disease may in part represent asymmetric   pulmonary edema, however cannot exclude superimposed infection (including   atypical or viral pathogens) or ARDS. Enlarged branch main pulmonary arteries may be seen with pulmonary   hypertension. CT HEAD WO CONTRAST   Final Result   1. No acute intracranial abnormality. 2. Cerebral and cerebellar parenchymal volume loss with chronic microvascular   white matter ischemic disease. XR CHEST 1 VW   Final Result   No interval change in bilateral multifocal airspace opacities. Small right   pleural effusion.

## 2020-07-16 NOTE — CARE COORDINATION
Sw received call from Devan at [facility] [phone]) who reports pt will need PT/OT evals when [facility] precert is started.       Electronically signed by DINA Lynn, APOLINAR on 7/16/2020 at 1:26 PM

## 2020-07-16 NOTE — PLAN OF CARE
Problem: Falls - Risk of:  Goal: Will remain free from falls  Description: Will remain free from falls  7/16/2020 0846 by Crow Singh RN  Outcome: Ongoing     Problem: Falls - Risk of:  Goal: Absence of physical injury  Description: Absence of physical injury  7/16/2020 0846 by Crow Singh RN  Outcome: Ongoing     Problem: Skin Integrity:  Goal: Will show no infection signs and symptoms  Description: Will show no infection signs and symptoms  7/16/2020 0846 by Crow Singh RN  Outcome: Ongoing     Problem: Skin Integrity:  Goal: Absence of new skin breakdown  Description: Absence of new skin breakdown  7/16/2020 0846 by Crow Singh RN  Outcome: Ongoing

## 2020-07-16 NOTE — PROGRESS NOTES
Clinical Pharmacy Note  Vancomycin Consult    Davide Pratt is a 80 y.o. male ordered Vancomycin for Pneumonia; consult received from Dr. Sohan Gaxiola to manage therapy. Also receiving cefepime . Patient Active Problem List   Diagnosis    Hypertension, benign    Osteopenia    Spinal stenosis    Mixed hyperlipidemia    Radial nerve injury    Hip fracture, left (HCC)    Arthritis of left knee    Trigger finger, acquired    Paroxysmal atrial fibrillation (HCC)    Frequent PVCs    Alcohol abuse    Hypokalemia    Syncope and collapse    Hyperlipidemia    PAF (paroxysmal atrial fibrillation) ([facility])    Essential hypertension    Macrocytic anemia    Closed fracture of ramus of left pubis (HCC)    Contusion of left hip    SVT (supraventricular tachycardia) ([facility])    Accidental fall    Closed head injury with concussion    Dementia ([facility])    Hyponatremia    Scalp laceration    Tachycardia    Closed nondisplaced fracture of proximal phalanx of left ring finger    Compression fracture of T12 vertebra ([facility])    Hyponatremia    Closed fracture of neck of right femur ([facility])    Closed fracture of right hip ([facility])    Acute respiratory failure with hypoxia ([facility])    COVID-19    Multifocal pneumonia    Pleural effusion, bilateral    Abnormal chest CT       Allergies:  Patient has no known allergies.      Temp max:  Temp (24hrs), Av.6 °F (36.4 °C), Min:97.2 °F (36.2 °C), Max:98.1 °F (36.7 °C)      Recent Labs     20  1222 07/15/20  0559 20  0807   WBC 20.3* 18.4* 23.0*       Recent Labs     20  1222 07/15/20  0559 20  0639   BUN 38* 45* 45*   CREATININE 1.5* 1.6* 1.4*         Intake/Output Summary (Last 24 hours) at 2020 0852  Last data filed at 2020 0644  Gross per 24 hour   Intake 330 ml   Output 125 ml   Net 205 ml       Culture Results:      Ht Readings from Last 1 Encounters:   20 5' 8\" (1.727 m)        Wt Readings from Last 1 Encounters:   20 168 lb 6.9 oz (76.4 kg)         Estimated Creatinine Clearance: 36 mL/min (A) (based on SCr of 1.4 mg/dL (H)). Assessment/Plan:  Vancomycin day 4  Vancomycin random level 18.2mg/L 21 hours post 1250mg dose  Scr  at 1.4 mg/dL  Will give vanco 1000mg x1  today and check a random vanco level tomorrow 22 hours post dose. Patient has a + mrsa nasal swab  Thank you for the consult.      Electronically signed by Joceline Hanley, [address] [place] [address] on 7/16/2020 at 8:52 AM

## 2020-07-16 NOTE — PROGRESS NOTES
Patient went from 3 L nasal cannula to 15L HFNC. Patient's 02 was not staying above 90%. RN notified and patient placed back on BiPAP.  Electronically signed by Juan David Shah RCP on 7/15/2020 at 8:43 PM

## 2020-07-16 NOTE — PROGRESS NOTES
Pt SpO2 dropped to 80%. This nurse instructed pt to deep  Breathe and turned O2 up to 5Lpm from 3Lpm.  Pt momentarily came up to 93% but dropped right back down to 85%. RT put pt on High ugo NC at 15 lpm and pt SpO2 came up to 93% while deep breathing. Pt SpO2 dropped back down to mid 80s when no deep breathing. RT is putting Pt on bipap and states the pt will need a chest xray. Doctor notified. Awaiting new orders. Will continue to monitor.

## 2020-07-17 LAB
ALBUMIN SERPL-MCNC: 2.7 G/DL (ref 3.4–5)
ANION GAP SERPL CALCULATED.3IONS-SCNC: 13 MMOL/L (ref 3–16)
ANISOCYTOSIS: ABNORMAL
BANDED NEUTROPHILS RELATIVE PERCENT: 2 % (ref 0–7)
BASOPHILS ABSOLUTE: 0 K/UL (ref 0–0.2)
BASOPHILS RELATIVE PERCENT: 0 %
BUN BLDV-MCNC: 46 MG/DL (ref 7–20)
CALCIUM SERPL-MCNC: 8.3 MG/DL (ref 8.3–10.6)
CHLORIDE BLD-SCNC: 109 MMOL/L (ref 99–110)
CO2: 22 MMOL/L (ref 21–32)
CREAT SERPL-MCNC: 1.5 MG/DL (ref 0.8–1.3)
CRENATED RBC'S: ABNORMAL
CULTURE, BLOOD 2: NORMAL
EOSINOPHILS ABSOLUTE: 0 K/UL (ref 0–0.6)
EOSINOPHILS RELATIVE PERCENT: 0 %
GFR AFRICAN AMERICAN: 53
GFR NON-AFRICAN AMERICAN: 44
GLUCOSE BLD-MCNC: 138 MG/DL (ref 70–99)
HCT VFR BLD CALC: 32.1 % (ref 40.5–52.5)
HEMOGLOBIN: 9.8 G/DL (ref 13.5–17.5)
INR BLD: 1.12 (ref 0.86–1.14)
LYMPHOCYTES ABSOLUTE: 1.8 K/UL (ref 1–5.1)
LYMPHOCYTES RELATIVE PERCENT: 8 %
MCH RBC QN AUTO: 30.9 PG (ref 26–34)
MCHC RBC AUTO-ENTMCNC: 30.6 G/DL (ref 31–36)
MCV RBC AUTO: 101.2 FL (ref 80–100)
MONOCYTES ABSOLUTE: 2 K/UL (ref 0–1.3)
MONOCYTES RELATIVE PERCENT: 9 %
MYELOCYTE PERCENT: 1 %
NEUTROPHILS ABSOLUTE: 18.8 K/UL (ref 1.7–7.7)
NEUTROPHILS RELATIVE PERCENT: 80 %
NUCLEATED RED BLOOD CELLS: 1 /100 WBC
NUCLEATED RED BLOOD CELLS: 1 /100 WBC
OVALOCYTES: ABNORMAL
PDW BLD-RTO: 15.4 % (ref 12.4–15.4)
PHOSPHORUS: 3.5 MG/DL (ref 2.5–4.9)
PLATELET # BLD: 351 K/UL (ref 135–450)
PLATELET SLIDE REVIEW: ADEQUATE
PMV BLD AUTO: 9 FL (ref 5–10.5)
POLYCHROMASIA: ABNORMAL
POTASSIUM SERPL-SCNC: 4.6 MMOL/L (ref 3.5–5.1)
PROCALCITONIN: 0.61 NG/ML (ref 0–0.15)
PROTHROMBIN TIME: 13 SEC (ref 10–13.2)
RBC # BLD: 3.17 M/UL (ref 4.2–5.9)
SLIDE REVIEW: ABNORMAL
SODIUM BLD-SCNC: 144 MMOL/L (ref 136–145)
TEAR DROP CELLS: ABNORMAL
VANCOMYCIN RANDOM: 21.4 UG/ML
WBC # BLD: 22.6 K/UL (ref 4–11)

## 2020-07-17 PROCEDURE — 97162 PT EVAL MOD COMPLEX 30 MIN: CPT

## 2020-07-17 PROCEDURE — 6370000000 HC RX 637 (ALT 250 FOR IP): Performed by: PEDIATRICS

## 2020-07-17 PROCEDURE — 97530 THERAPEUTIC ACTIVITIES: CPT

## 2020-07-17 PROCEDURE — 85610 PROTHROMBIN TIME: CPT

## 2020-07-17 PROCEDURE — 84145 PROCALCITONIN (PCT): CPT

## 2020-07-17 PROCEDURE — 2700000000 HC OXYGEN THERAPY PER DAY

## 2020-07-17 PROCEDURE — 80069 RENAL FUNCTION PANEL: CPT

## 2020-07-17 PROCEDURE — 2580000003 HC RX 258: Performed by: INTERNAL MEDICINE

## 2020-07-17 PROCEDURE — 6360000002 HC RX W HCPCS: Performed by: INTERNAL MEDICINE

## 2020-07-17 PROCEDURE — 94761 N-INVAS EAR/PLS OXIMETRY MLT: CPT

## 2020-07-17 PROCEDURE — 36415 COLL VENOUS BLD VENIPUNCTURE: CPT

## 2020-07-17 PROCEDURE — 85025 COMPLETE CBC W/AUTO DIFF WBC: CPT

## 2020-07-17 PROCEDURE — 6360000002 HC RX W HCPCS: Performed by: PEDIATRICS

## 2020-07-17 PROCEDURE — 99232 SBSQ HOSP IP/OBS MODERATE 35: CPT | Performed by: INTERNAL MEDICINE

## 2020-07-17 PROCEDURE — 2580000003 HC RX 258: Performed by: PEDIATRICS

## 2020-07-17 PROCEDURE — 97166 OT EVAL MOD COMPLEX 45 MIN: CPT

## 2020-07-17 PROCEDURE — 2060000000 HC ICU INTERMEDIATE R&B

## 2020-07-17 PROCEDURE — 80202 ASSAY OF VANCOMYCIN: CPT

## 2020-07-17 RX ORDER — FUROSEMIDE 10 MG/ML
40 INJECTION INTRAMUSCULAR; INTRAVENOUS ONCE
Status: COMPLETED | OUTPATIENT
Start: 2020-07-17 | End: 2020-07-17

## 2020-07-17 RX ADMIN — AMITRIPTYLINE HYDROCHLORIDE 12.5 MG: 25 TABLET, FILM COATED ORAL at 20:38

## 2020-07-17 RX ADMIN — TAMSULOSIN HYDROCHLORIDE 0.4 MG: 0.4 CAPSULE ORAL at 10:53

## 2020-07-17 RX ADMIN — SODIUM CHLORIDE, PRESERVATIVE FREE 10 ML: 5 INJECTION INTRAVENOUS at 10:52

## 2020-07-17 RX ADMIN — METHYLPREDNISOLONE SODIUM SUCCINATE 40 MG: 40 INJECTION, POWDER, FOR SOLUTION INTRAMUSCULAR; INTRAVENOUS at 10:53

## 2020-07-17 RX ADMIN — GABAPENTIN 100 MG: 100 CAPSULE ORAL at 10:54

## 2020-07-17 RX ADMIN — DIVALPROEX SODIUM 250 MG: 125 CAPSULE ORAL at 20:38

## 2020-07-17 RX ADMIN — AMIODARONE HYDROCHLORIDE 200 MG: 200 TABLET ORAL at 10:54

## 2020-07-17 RX ADMIN — METOPROLOL SUCCINATE 50 MG: 50 TABLET, EXTENDED RELEASE ORAL at 10:53

## 2020-07-17 RX ADMIN — CEFEPIME HYDROCHLORIDE 2 G: 2 INJECTION, POWDER, FOR SOLUTION INTRAVENOUS at 17:12

## 2020-07-17 RX ADMIN — MUPIROCIN: 20 OINTMENT TOPICAL at 20:40

## 2020-07-17 RX ADMIN — MUPIROCIN: 20 OINTMENT TOPICAL at 10:54

## 2020-07-17 RX ADMIN — GABAPENTIN 100 MG: 100 CAPSULE ORAL at 20:38

## 2020-07-17 RX ADMIN — ASPIRIN 81 MG: 81 TABLET, COATED ORAL at 20:39

## 2020-07-17 RX ADMIN — FUROSEMIDE 40 MG: 10 INJECTION, SOLUTION INTRAMUSCULAR; INTRAVENOUS at 17:12

## 2020-07-17 RX ADMIN — ASPIRIN 81 MG: 81 TABLET, COATED ORAL at 10:53

## 2020-07-17 RX ADMIN — ENOXAPARIN SODIUM 40 MG: 40 INJECTION SUBCUTANEOUS at 10:53

## 2020-07-17 ASSESSMENT — PAIN SCALES - GENERAL
PAINLEVEL_OUTOF10: 0

## 2020-07-17 ASSESSMENT — PAIN SCALES - WONG BAKER
WONGBAKER_NUMERICALRESPONSE: 0

## 2020-07-17 NOTE — CARE COORDINATION
Pt will need PT/OT Evals to start [address] [facility] Liaison following.   Antonio Estrada [phone]    Electronically signed by Rosetta Fregoso on 7/17/2020 at 9:08 AM

## 2020-07-17 NOTE — CARE COORDINATION
PT/OT notes available. Call to Devan at [facility] [phone], she advised precert has been started with [facility] Medicare. Spoke with patient's daughter, Merlin Gomez, over the phone and updated on plan. Reviewed IMM. Electronically signed by Laura Olivas RN on 7/17/2020 at 4:05 PM     Confirmed with Devan at [facility] that no additional Covid testing is needed unless he develops symptoms.     Electronically signed by Laura Olivas RN on 7/17/2020 at 4:08 PM

## 2020-07-17 NOTE — PROGRESS NOTES
Hospitalist Progress Note      PCP: Annabella Pickens MD    Date of Admission: 7/12/2020    Chief Complaint: SOB    Hospital Course: 80 y.o. male With medical history including atrial fibrillation, hypertension, hyperlipidemia presents with complaints of shortness of breath. Patient recently underwent surgical repair of right hip fracture on 7/6/2020. In the emergency department patient was found to be hypoxic requiring support with BiPAP to maintain adequate oxygenation. Laboratory work-up notable for marked leukocytosis. Imaging demonstrated multifocal opacities although these are seen on previous imaging    7/13  Patient CT scan shows bilateral pleural effusions along with bilateral opacities. Patient presented on 3 L oxygen saturating 88%. He required CPAP for a second and a nonrebreather and he is now on 6 L. Patient was febrile up to 103 which is since come down to 98. Is proBNP is actually lower than it was before, his creatinine is stable at 1.5. Patient's white count has gone up from 14-19. His troponin is also slightly elevated 0.04. Blood cultures have been drawn. His COVID-19 test returned negative. I have added on a another COVID-19 test but this time PCR. Pulmonology was also consulted    7/14 Patient remains stable on 6L NC. Still alert and answering questions. Awaiting AM labs. Broadened abx yesterday. 7/15  Patient appears to be more lethargic today than he was yesterday. Patient's creatinine remaining stable along with his sodium level. Procalcitonin is trending down with IV antibiotics    7/16 Oxygen demand fluctuates. Overnight up to 15L but now he is on 4L with no intervention. No acute distress. CXR with worsening pulm edema. Will discuss with Pulm giving some lasix. Subjective: Patient seen and examined. Patient stable overnight on 4-5L of oxygen. Procal continues to trend down and WBC stable. Renal function stable after one dose of lasix yesterday.  PT/OT ordered to assess for placement. Medications:  Reviewed    Infusion Medications   Scheduled Medications    mupirocin   Topical BID    methylPREDNISolone  40 mg Intravenous Daily    amiodarone  200 mg Oral Daily    amitriptyline  12.5 mg Oral Nightly    aspirin EC  81 mg Oral BID    divalproex  250 mg Oral Nightly    gabapentin  100 mg Oral BID    metoprolol succinate  50 mg Oral Daily    tamsulosin  0.4 mg Oral Daily    sodium chloride flush  10 mL Intravenous 2 times per day    enoxaparin  40 mg Subcutaneous Daily    cefepime  2 g Intravenous Q24H    vancomycin (VANCOCIN) intermittent dosing (placeholder)   Other RX Placeholder     PRN Meds: melatonin, HYDROcodone-acetaminophen, sodium chloride flush, acetaminophen **OR** acetaminophen, polyethylene glycol, promethazine **OR** ondansetron      Intake/Output Summary (Last 24 hours) at 7/17/2020 0725  Last data filed at 7/16/2020 1911  Gross per 24 hour   Intake 540 ml   Output 250 ml   Net 290 ml       Physical Exam Performed:    BP (!) 174/65   Pulse 58   Temp 97.7 °F (36.5 °C) (Oral)   Resp 18   Ht 5' 8\" (1.727 m)   Wt 170 lb 13.7 oz (77.5 kg)   SpO2 94%   BMI 25.98 kg/m²     General appearance: No apparent distress, appears stated age and cooperative. HEENT: Pupils equal, round, and reactive to light. Conjunctivae/corneas clear. Neck: Supple, with full range of motion. No jugular venous distention. Trachea midline. Respiratory:  Normal respiratory effort. Diminished breath sounds bilateral   cardiovascular: Regular rate and rhythm with normal S1/S2   Abdomen: Soft, non-tender, non-distended with normal bowel sounds. Musculoskeletal: No clubbing, cyanosis or edema bilaterally. Skin: Skin color, texture, turgor normal.  No rashes or lesions. Neurologic:  Neurovascularly intact without any focal sensory/motor deficits.  Cranial nerves: II-XII intact, grossly non-focal.  Psychiatric: Alert  Capillary Refill: Brisk,< 3 seconds   Peripheral Pulses: +2 palpable, equal bilaterally       Labs:   Recent Labs     07/15/20  0559 07/16/20  0807 07/17/20  0536   WBC 18.4* 23.0* 22.6*   HGB 9.0* 10.2* 9.8*   HCT 28.5* 33.5* 32.1*    355 351     Recent Labs     07/15/20  0559 07/16/20  0639 07/17/20  0536   * 145 144   K 4.3 4.7 4.6    111* 109   CO2 26 22 22   BUN 45* 45* 46*   CREATININE 1.6* 1.4* 1.5*   CALCIUM 8.8 8.2* 8.3   PHOS 3.9 3.4 3.5     No results for input(s): AST, ALT, BILIDIR, BILITOT, ALKPHOS in the last 72 hours. Recent Labs     07/17/20  0536   INR 1.12     No results for input(s): Mary Jacobsen in the last 72 hours. Urinalysis:      Lab Results   Component Value Date    NITRU Negative 07/01/2020    WBCUA 1 07/01/2020    RBCUA 1 07/01/2020    BLOODU Negative 07/01/2020    SPECGRAV 1.013 07/01/2020    GLUCOSEU Negative 07/01/2020       Radiology:  XR CHEST PORTABLE   Final Result   1. Worsening bilateral lung infiltrates with associated bilateral pleural   effusions. XR CHEST PORTABLE   Final Result   Continued bilateral pleural effusions and bilateral airspace disease, with   shifting areas of consolidation. This suggests at least a component of   pulmonary edema. Pneumonia and ARDS remain differential possibilities. CT CHEST PULMONARY EMBOLISM W CONTRAST   Final Result   Negative for acute pulmonary embolism. Respiratory motion artifact limits   evaluation to the segmental level. Features of heart failure with moderate volume pleural effusions. Progressive multifocal airspace disease may in part represent asymmetric   pulmonary edema, however cannot exclude superimposed infection (including   atypical or viral pathogens) or ARDS. Enlarged branch main pulmonary arteries may be seen with pulmonary   hypertension. CT HEAD WO CONTRAST   Final Result   1. No acute intracranial abnormality.    2. Cerebral and cerebellar parenchymal volume loss with chronic microvascular   white matter ischemic disease. XR CHEST 1 VW   Final Result   No interval change in bilateral multifocal airspace opacities. Small right   pleural effusion.                  Assessment/Plan:    Sepsis  Temperature, heart rate, respirations, source lungs  Lactic acid normal  Hold on IV fluids  Vancomycin and cefepime D5  Reorder COVID-19: neg  resolved    Acute respiratory failure with hypoxia  Titrate oxygen to keep saturations above 90%  Down to 5 L  Pulmonology following    HCAP  Procalcitonin elevated but trending down  Cefepime and Vanc due to recent hospitalization  Recheck COVID-19 PCR also negative  Strep and Legionella negative, MRSA nasal probe positive  resp cult ordered, fungal cultures    Atrial fibrillation  Continue home amiodarone    Chronic Kidney Disease Stage 3  Stable  No need for further intervention  monitor    DVT Prophylaxis: lovenox  Diet: Diet NPO, After Midnight  Code Status: DNR-CCA    PT/OT Eval Status: Ordered    Dispo - Shad Bagley MD

## 2020-07-17 NOTE — CARE COORDINATION
Pre-cert started with [facility]. Pt will not need a HENS at D/C.   Vernon Ramírez [phone]  Electronically signed by William Morrell on 7/17/2020 at 4:59 PM

## 2020-07-17 NOTE — PROGRESS NOTES
a low to moderate intensity upon d/c. Will continue to follow. Treatment Diagnosis: difficulty walking  Prognosis: Good  Decision Making: Medium Complexity  History: Per Devon Stuart MD \"59 y.o. male With medical history including atrial fibrillation, hypertension, hyperlipidemia presents with complaints of shortness of breath. Patient recently underwent surgical repair of right hip fracture on 7/6/2020. In the emergency department patient was found to be hypoxic requiring support with BiPAP to maintain adequate oxygenation. Laboratory work-up notable for marked leukocytosis. Imaging demonstrated multifocal opacities although these are seen on previous imaging. \"  Exam: functional mobility, ROM, strength  Clinical Presentation: evolving  PT Education: Goals;PT Role;Plan of Care;General Safety;Precautions;Transfer Training;Functional Mobility Training  Barriers to Learning: cognition, [place]  REQUIRES PT FOLLOW UP: Yes  Activity Tolerance  Activity Tolerance: Patient limited by cognitive status  Activity Tolerance: slow processing and very [place]       Patient Diagnosis(es): The encounter diagnosis was Hypoxia. has a past medical history of Alcohol abuse, Arthritis of left knee, Atrial fibrillation ([address] HTN (hypertension), Hyperlipidemia, MRSA colonization, and PAF (paroxysmal atrial fibrillation) ([address] has a past surgical history that includes Hip fracture surgery (Left) and hip surgery (Right, 7/6/2020).     Restrictions  Restrictions/Precautions  Restrictions/Precautions: Fall Risk  Position Activity Restriction  Other position/activity restrictions: posterior hip precautions due to recent R hip surgery     Vision/Hearing  Vision: Within Functional Limits  Hearing: Exceptions to [facility]  Hearing Exceptions: Hard of hearing/hearing concerns       Subjective  General  Chart Reviewed: Yes  Patient assessed for rehabilitation services?: Yes  Additional Pertinent Hx: Per Devon Stuart MD \"89 y.o. male With medical history including Assistance(from william littlejohn)  Bed to Chair: Dependent/Total(via william littlejohn)  Comment: bed height elevated prior to sit to stand from bed due to posterior hip precautions     Ambulation  Ambulation?: No     Balance  Posture: Fair  Comments: CGA for standing balance in stedy; CGA for sitting balance EOB        Plan   Plan  Times per week: 3-5x/wk  Current Treatment Recommendations: Strengthening, ROM, Balance Training, Functional Mobility Training, Transfer Training, Home Exercise Program, Safety Education & Training, Patient/Caregiver Education & Training, Equipment Evaluation, Education, & procurement, Gait Training  Safety Devices  Type of devices:  All fall risk precautions in place, Gait belt, Call light within reach, Left in chair, Nurse notified, Chair alarm in place(RN [facility] notified)  Restraints  Initially in place: No      AM-PAC Score  AM-PAC Inpatient Mobility Raw Score : 9 (07/17/20 1445)  AM-PAC Inpatient T-Scale Score : 30.55 (07/17/20 1445)  Mobility Inpatient CMS 0-100% Score: 81.38 (07/17/20 1445)  Mobility Inpatient CMS G-Code Modifier : CM (07/17/20 1445)          Goals  Short term goals  Time Frame for Short term goals: upon d/c  Short term goal 1: bed mobility modA x1  Short term goal 2: transfers CGA  Short term goal 3: ambulate 8' with RW and minAx2  Long term goals  Time Frame for Long term goals : LTG=STG  Patient Goals   Patient goals : unable to state goal       Therapy Time   Individual Concurrent Group Co-treatment   Time In 1404         Time Out 1443         Minutes 39         Timed Code Treatment Minutes: 24 Minutes         Electronically signed by Yovanny Darby, PT [id number]  on 7/17/2020 at 2:46 PM

## 2020-07-17 NOTE — PROGRESS NOTES
Occupational Therapy   Occupational Therapy Initial Assessment and Tentative D/C    Date: 2020   Patient Name: Geovany Bender  MRN: [id number]     : 1932    Date of Service: 2020    Discharge Recommendations: Geovany Bender scored a 13/24 on the AM-PAC ADL Inpatient form. Current research shows that an AM-PAC score of 17 or less is typically not associated with a discharge to the patient's home setting. Based on the patient's AM-PAC score and their current ADL deficits, it is recommended that the patient have 3-5 sessions per week of Occupational Therapy at d/c to increase the patient's independence. Please see assessment section for further patient specific details. If patient discharges prior to next session this note will serve as a discharge summary. Please see below for the latest assessment towards goals. Continue to assess pending progress, 3-5 sessions per week  OT Equipment Recommendations  Equipment Needed: No    Assessment   Performance deficits / Impairments: Decreased functional mobility ; Decreased strength;Decreased endurance;Decreased ADL status; Decreased cognition;Decreased balance;Decreased high-level IADLs  Assessment: Geovany Bender is a 80 y.o. male that presents from facility for hypoxia and shortness of breath in the context of undergoing recent surgery on fracture of right hip on . Past medical history of atrial fibrillation, paroxysmal atrial fibrillation, chronic kidney disease. Per EMS report at the facility he was saturating the 60s and 70s so they started him on nonrebreather. He arrives alert and awake and oriented to self. Denies abdominal pain, headache. States he does feel short of breath. He arrives febrile and hypoxic but alert and awake. DNR paperwork at bedside. PTA pt from [address] [place] [address]. Pt recently with R hip surgery. Pt currently functioning below baseline completing bed mobility with Max Ax2.  Pt completes transfers with Min Ax2 and use of Follows one step commands with increased time; Follows one step commands with repetition; Inconsistently follows commands  Memory: Decreased recall of recent events  Insights: Decreased awareness of deficits  Initiation: Requires cues for all  Sequencing: Requires cues for all        Sensation  Overall Sensation Status: (N/T)        LUE AROM (degrees)  LUE AROM : WFL  RUE AROM (degrees)  RUE AROM : WFL                      Plan   Plan  Times per week: 3-5x  Current Treatment Recommendations: Strengthening, Functional Mobility Training, Balance Training, Self-Care / ADL, Safety Education & Training, Patient/Caregiver Education & Training, Endurance Training      AM-PAC Score        AM-PAC Inpatient Daily Activity Raw Score: 13 (07/17/20 1444)  AM-PAC Inpatient ADL T-Scale Score : 32.03 (07/17/20 1444)  ADL Inpatient CMS 0-100% Score: 63.03 (07/17/20 1444)  ADL Inpatient CMS G-Code Modifier : CL (07/17/20 1444)    Goals  Short term goals  Time Frame for Short term goals: prior to D/C  Short term goal 1: complete functional mobility and transfers with Min A and AD as needed  Short term goal 2: complete grooming with setup  Short term goal 3: complete toileting with Min A  Short term goal 4: complete bed mobility with Min A  Short term goal 5: tolerate B UE exericses x10 reps for increased strength and endurance with transfers  Long term goals  Time Frame for Long term goals : STG=LTG  Patient Goals   Patient goals : no stated goals       Therapy Time   Individual Concurrent Group Co-treatment   Time In 1404         Time Out 1443         Minutes 39         Timed Code Treatment Minutes: 24 Minutes(15 minute eval)       DIANE Tobin/L

## 2020-07-17 NOTE — PROGRESS NOTES
0-25%      Nutrition Interventions:   Food and/or Nutrient Delivery:  Continue Current Diet, Start Oral Nutrition Supplement  Nutrition Education/Counseling:  No recommendation at this time   Coordination of Nutrition Care:  Continued Inpatient Monitoring    Goals:   Tolerate diet and consume greater than 50% of meals and supplements thits admission       Nutrition Monitoring and Evaluation:   Behavioral-Environmental Outcomes:  (na)   Food/Nutrient Intake Outcomes:  Diet Advancement/Tolerance, Food and Nutrient Intake, Supplement Intake  Physical Signs/Symptoms Outcomes:  Biochemical Data, Constipation, Diarrhea, Nausea or Vomiting, Hemodynamic Status, Nutrition Focused Physical Findings, Weight, Skin     Discharge Planning:    Continue current diet     Electronically signed by Zoë Marshall RD, LD on 7/17/20 at 1:27 PM EDT    Contact: [phone]

## 2020-07-17 NOTE — PROGRESS NOTES
Clinical Pharmacy Note  Vancomycin Consult    Aundrea Bai is a 80 y.o. male ordered Vancomycin for Pneumonia; consult received from Dr. Michael Shi to manage therapy. Also receiving cefepime . Patient Active Problem List   Diagnosis    Hypertension, benign    Osteopenia    Spinal stenosis    Mixed hyperlipidemia    Radial nerve injury    Hip fracture, left (HCC)    Arthritis of left knee    Trigger finger, acquired    Paroxysmal atrial fibrillation (HCC)    Frequent PVCs    Alcohol abuse    Hypokalemia    Syncope and collapse    Hyperlipidemia    [facility]ox[facility])    Essential hypertension    Macrocytic anemia    Closed fracture of ramus of left pubis (HCC)    Contusion of left hip    SVT (supraventricular tachycardia) ([facility])    Accidental fall    Closed head injury with concussion    Dementia ([facility])    Hyponatremia    Scalp laceration    Tachycardia    Closed nondisplaced fracture of proximal phalanx of left ring finger    Compression fracture of T12 vertebra ([facility])    Hyponatremia    Closed fracture of neck of right femur ([facility])    Closed fracture of right hip ([facility])    Acute respiratory failure with hypoxia ([facility])    COVID-19    Multifocal pneumonia    Pleural effusion, bilateral    Abnormal chest CT       Allergies:  Patient has no known allergies.      Temp max:  Temp (24hrs), Av.6 °F (36.4 °C), Min:97.5 °F (36.4 °C), Max:97.8 °F (36.6 °C)      Recent Labs     07/15/20  0559 20  0807 20  0536   WBC 18.4* 23.0* 22.6*       Recent Labs     07/15/20  0559 20  0639 20  0536   BUN 45* 45* 46*   CREATININE 1.6* 1.4* 1.5*         Intake/Output Summary (Last 24 hours) at 2020 1725  Last data filed at 2020 1911  Gross per 24 hour   Intake 120 ml   Output --   Net 120 ml       Culture Results:      Ht Readings from Last 1 Encounters:   20 5' 8\" (1.727 m)        Wt Readings from Last 1 Encounters:   20 170 lb 13.7 oz (77.5 kg)         Estimated Creatinine Clearance: 34 mL/min (A) (based on SCr of 1.5 mg/dL (H)). Assessment/Plan:  Vancomycin Day #5  Random Vancomycin level on 07/17/20 at 1200 = 21.4 ug/mL. Will re-dose Vancomycin 750 mg IVPB x 1 on 07/18/20 at 0000  Random Vancomycin level ordered for 07/19/20 at 0000 to assist with subsequent dosing/management. Thank you for the consult. Will continue to follow.     Mojgan Lantigua, PharmD, BCPS  7/17/2020 5:27 PM

## 2020-07-17 NOTE — PROGRESS NOTES
Pulmonary Progress Note    CC: Acute hypoxic respiratory failure  Multifocal pneumonia  Pleural effusions  Abnormal chest CT    24 hr events:  Oxygen requirements down to 3 L today. ROS:  Unable to obtain as patient does not answer questions    PHYSICAL EXAM:  Blood pressure (!) 168/75, pulse 60, temperature 97.8 °F (36.6 °C), temperature source Axillary, resp. rate 18, height 5' 8\" (1.727 m), weight 170 lb 13.7 oz (77.5 kg), SpO2 91 %. on 3L NC    Intake/Output Summary (Last 24 hours) at 7/17/2020 1442  Last data filed at 7/16/2020 1911  Gross per 24 hour   Intake 240 ml   Output --   Net 240 ml       Gen: Well developed; well nourished  Eyes: No scleral icterus. No conjunctival injection. ENT: External appearance of ears and nose normal.  Neck: Trachea midline. No obvious mass. No visible thyroid enlargement    Respiratory:  Crackles bilaterally, no accessory muscle use  Cardiovascular: Regular rate and rhythm, no appreciable murmurs. No edema. Skin: Warm and dry. No rashes or ulcers on visible areas. Normal texture and turgor  Musculoskeletal: No cyanosis, clubbing or joint deformity.     Psychiatric: Normal mood and affect; appears to have poor insight and judgement       Medications:  Current Facility-Administered Medications: melatonin tablet 3 mg, 3 mg, Oral, Nightly PRN  mupirocin (BACTROBAN) 2 % ointment, , Topical, BID  methylPREDNISolone sodium (SOLU-MEDROL) injection 40 mg, 40 mg, Intravenous, Daily  amiodarone (CORDARONE) tablet 200 mg, 200 mg, Oral, Daily  amitriptyline (ELAVIL) tablet 12.5 mg, 12.5 mg, Oral, Nightly  aspirin EC tablet 81 mg, 81 mg, Oral, BID  divalproex (DEPAKOTE SPRINKLE) capsule 250 mg, 250 mg, Oral, Nightly  gabapentin (NEURONTIN) capsule 100 mg, 100 mg, Oral, BID  HYDROcodone-acetaminophen (NORCO) 5-325 MG per tablet 1 tablet, 1 tablet, Oral, Q4H PRN  metoprolol succinate (TOPROL XL) extended release tablet 50 mg, 50 mg, Oral, Daily  tamsulosin (FLOMAX) capsule 0.4 mg, 0.4 mg, Oral, Daily  sodium chloride flush 0.9 % injection 10 mL, 10 mL, Intravenous, 2 times per day  sodium chloride flush 0.9 % injection 10 mL, 10 mL, Intravenous, PRN  acetaminophen (TYLENOL) tablet 650 mg, 650 mg, Oral, Q6H PRN **OR** acetaminophen (TYLENOL) suppository 650 mg, 650 mg, Rectal, Q6H PRN  polyethylene glycol (GLYCOLAX) packet 17 g, 17 g, Oral, Daily PRN  promethazine (PHENERGAN) tablet 12.5 mg, 12.5 mg, Oral, Q6H PRN **OR** ondansetron (ZOFRAN) injection 4 mg, 4 mg, Intravenous, Q6H PRN  enoxaparin (LOVENOX) injection 40 mg, 40 mg, Subcutaneous, Daily  cefepime (MAXIPIME) 2 g IVPB minibag, 2 g, Intravenous, Q24H  vancomycin (VANCOCIN) intermittent dosing (placeholder), , Other, RX Placeholder    Data reviewed:  Labs:  CBC:   Recent Labs     07/15/20  0559 07/16/20  0807 07/17/20  0536   WBC 18.4* 23.0* 22.6*   HGB 9.0* 10.2* 9.8*   HCT 28.5* 33.5* 32.1*   MCV 96.7 100.9* 101.2*    355 351     BMP:   Recent Labs     07/15/20  0559 07/16/20  0639 07/17/20  0536   * 145 144   K 4.3 4.7 4.6    111* 109   CO2 26 22 22   PHOS 3.9 3.4 3.5   BUN 45* 45* 46*   CREATININE 1.6* 1.4* 1.5*     LIVER PROFILE:   No results for input(s): AST, ALT, LIPASE, BILIDIR, BILITOT, ALKPHOS in the last 72 hours. Invalid input(s): AMYLASE,  ALB  PT/INR:   Recent Labs     07/17/20  0536   PROTIME 13.0   INR 1.12     APTT:   No results for input(s): APTT in the last 72 hours. Cultures:   Blood culture (7/12): Negative  Nasal MRSA SA probe: Positive  Urine strep and legionella antigens: Negative      Films:  Chest images and reports were reviewed by me. My interpretation is:  CXR (7/16/20):  Bilateral infiltrates (slightly increased in the right upper lung zone compared to 7/15)      Assessment:     Acute hypoxic respiratory failure  Multifocal pneumonia  Pleural effusions  Abnormal chest CT       Plan:    Acute hypoxic respiratory failure  -Appears to be due to pneumonia  and likely volume overload  -Wean supplemental oxygen as able to keep oxygen saturation greater than 90%     Multifocal pneumonia  -COVID PCR is negative  -Continue cefepime and vancomycin (day #5/7)  -Bactroban to the nares  -Follow up fungal serologies  -Daily procalcitonin     Pleural effusions  -Give another dose of Lasix 40 mg IV x1     Abnormal chest CT  -He has bilateral infiltrates and consolidation which seem to represent a combination of pneumonia and pulmonary edema  -Continue cefepime and vancomycin (day #5/7)  -Give another dose of Lasix 40 mg IV x1    Thank you for allowing me to participate in the care of this patient. Will follow.      Pippa Vaughn MD  [facility]

## 2020-07-18 LAB
ALBUMIN SERPL-MCNC: 3 G/DL (ref 3.4–5)
ANION GAP SERPL CALCULATED.3IONS-SCNC: 10 MMOL/L (ref 3–16)
ANISOCYTOSIS: ABNORMAL
BANDED NEUTROPHILS RELATIVE PERCENT: 1 % (ref 0–7)
BASOPHILS ABSOLUTE: 0 K/UL (ref 0–0.2)
BASOPHILS RELATIVE PERCENT: 0 %
BUN BLDV-MCNC: 44 MG/DL (ref 7–20)
CALCIUM SERPL-MCNC: 8.6 MG/DL (ref 8.3–10.6)
CHLORIDE BLD-SCNC: 110 MMOL/L (ref 99–110)
CO2: 25 MMOL/L (ref 21–32)
CREAT SERPL-MCNC: 1.4 MG/DL (ref 0.8–1.3)
EOSINOPHILS ABSOLUTE: 0 K/UL (ref 0–0.6)
EOSINOPHILS RELATIVE PERCENT: 0 %
GFR AFRICAN AMERICAN: 58
GFR NON-AFRICAN AMERICAN: 48
GLUCOSE BLD-MCNC: 144 MG/DL (ref 70–99)
HCT VFR BLD CALC: 30.4 % (ref 40.5–52.5)
HEMOGLOBIN: 9.8 G/DL (ref 13.5–17.5)
LYMPHOCYTES ABSOLUTE: 1.7 K/UL (ref 1–5.1)
LYMPHOCYTES RELATIVE PERCENT: 7 %
MCH RBC QN AUTO: 30.8 PG (ref 26–34)
MCHC RBC AUTO-ENTMCNC: 32.3 G/DL (ref 31–36)
MCV RBC AUTO: 95.3 FL (ref 80–100)
MONOCYTES ABSOLUTE: 1.2 K/UL (ref 0–1.3)
MONOCYTES RELATIVE PERCENT: 5 %
MYELOCYTE PERCENT: 1 %
NEUTROPHILS ABSOLUTE: 21.6 K/UL (ref 1.7–7.7)
NEUTROPHILS RELATIVE PERCENT: 86 %
OVALOCYTES: ABNORMAL
PDW BLD-RTO: 14.7 % (ref 12.4–15.4)
PHOSPHORUS: 3.3 MG/DL (ref 2.5–4.9)
PLATELET # BLD: 421 K/UL (ref 135–450)
PLATELET SLIDE REVIEW: ADEQUATE
PMV BLD AUTO: 8.7 FL (ref 5–10.5)
POIKILOCYTES: ABNORMAL
POTASSIUM SERPL-SCNC: 4.4 MMOL/L (ref 3.5–5.1)
PROCALCITONIN: 0.38 NG/ML (ref 0–0.15)
RBC # BLD: 3.2 M/UL (ref 4.2–5.9)
SLIDE REVIEW: ABNORMAL
SODIUM BLD-SCNC: 145 MMOL/L (ref 136–145)
VACUOLATED NEUTROPHILS: PRESENT
WBC # BLD: 24.6 K/UL (ref 4–11)

## 2020-07-18 PROCEDURE — 6360000002 HC RX W HCPCS: Performed by: PEDIATRICS

## 2020-07-18 PROCEDURE — 36415 COLL VENOUS BLD VENIPUNCTURE: CPT

## 2020-07-18 PROCEDURE — 84145 PROCALCITONIN (PCT): CPT

## 2020-07-18 PROCEDURE — 99232 SBSQ HOSP IP/OBS MODERATE 35: CPT | Performed by: INTERNAL MEDICINE

## 2020-07-18 PROCEDURE — 85025 COMPLETE CBC W/AUTO DIFF WBC: CPT

## 2020-07-18 PROCEDURE — 2580000003 HC RX 258: Performed by: PEDIATRICS

## 2020-07-18 PROCEDURE — 6360000002 HC RX W HCPCS: Performed by: INTERNAL MEDICINE

## 2020-07-18 PROCEDURE — 2580000003 HC RX 258: Performed by: INTERNAL MEDICINE

## 2020-07-18 PROCEDURE — 94761 N-INVAS EAR/PLS OXIMETRY MLT: CPT

## 2020-07-18 PROCEDURE — 6370000000 HC RX 637 (ALT 250 FOR IP): Performed by: NURSE PRACTITIONER

## 2020-07-18 PROCEDURE — 2700000000 HC OXYGEN THERAPY PER DAY

## 2020-07-18 PROCEDURE — 6370000000 HC RX 637 (ALT 250 FOR IP): Performed by: PEDIATRICS

## 2020-07-18 PROCEDURE — 2060000000 HC ICU INTERMEDIATE R&B

## 2020-07-18 PROCEDURE — 80069 RENAL FUNCTION PANEL: CPT

## 2020-07-18 RX ORDER — FUROSEMIDE 10 MG/ML
40 INJECTION INTRAMUSCULAR; INTRAVENOUS ONCE
Status: COMPLETED | OUTPATIENT
Start: 2020-07-18 | End: 2020-07-18

## 2020-07-18 RX ADMIN — SODIUM CHLORIDE, PRESERVATIVE FREE 10 ML: 5 INJECTION INTRAVENOUS at 20:58

## 2020-07-18 RX ADMIN — GABAPENTIN 100 MG: 100 CAPSULE ORAL at 09:46

## 2020-07-18 RX ADMIN — GABAPENTIN 100 MG: 100 CAPSULE ORAL at 20:58

## 2020-07-18 RX ADMIN — MUPIROCIN: 20 OINTMENT TOPICAL at 09:45

## 2020-07-18 RX ADMIN — ENOXAPARIN SODIUM 40 MG: 40 INJECTION SUBCUTANEOUS at 09:45

## 2020-07-18 RX ADMIN — FUROSEMIDE 40 MG: 10 INJECTION, SOLUTION INTRAMUSCULAR; INTRAVENOUS at 12:23

## 2020-07-18 RX ADMIN — DIVALPROEX SODIUM 250 MG: 125 CAPSULE ORAL at 20:57

## 2020-07-18 RX ADMIN — VANCOMYCIN HYDROCHLORIDE 750 MG: 750 INJECTION, POWDER, LYOPHILIZED, FOR SOLUTION INTRAVENOUS at 01:15

## 2020-07-18 RX ADMIN — SODIUM CHLORIDE, PRESERVATIVE FREE 10 ML: 5 INJECTION INTRAVENOUS at 09:45

## 2020-07-18 RX ADMIN — TAMSULOSIN HYDROCHLORIDE 0.4 MG: 0.4 CAPSULE ORAL at 09:46

## 2020-07-18 RX ADMIN — CEFEPIME HYDROCHLORIDE 2 G: 2 INJECTION, POWDER, FOR SOLUTION INTRAVENOUS at 16:42

## 2020-07-18 RX ADMIN — AMIODARONE HYDROCHLORIDE 200 MG: 200 TABLET ORAL at 09:46

## 2020-07-18 RX ADMIN — ASPIRIN 81 MG: 81 TABLET, COATED ORAL at 09:46

## 2020-07-18 RX ADMIN — MELATONIN 3 MG: at 20:58

## 2020-07-18 RX ADMIN — METOPROLOL SUCCINATE 50 MG: 50 TABLET, EXTENDED RELEASE ORAL at 09:46

## 2020-07-18 RX ADMIN — AMITRIPTYLINE HYDROCHLORIDE 12.5 MG: 25 TABLET, FILM COATED ORAL at 20:57

## 2020-07-18 RX ADMIN — METHYLPREDNISOLONE SODIUM SUCCINATE 40 MG: 40 INJECTION, POWDER, FOR SOLUTION INTRAMUSCULAR; INTRAVENOUS at 09:46

## 2020-07-18 RX ADMIN — ASPIRIN 81 MG: 81 TABLET, COATED ORAL at 20:58

## 2020-07-18 RX ADMIN — MUPIROCIN: 20 OINTMENT TOPICAL at 21:00

## 2020-07-18 ASSESSMENT — PAIN SCALES - WONG BAKER

## 2020-07-18 ASSESSMENT — PAIN SCALES - GENERAL
PAINLEVEL_OUTOF10: 0

## 2020-07-18 ASSESSMENT — PAIN DESCRIPTION - PAIN TYPE: TYPE: CHRONIC PAIN

## 2020-07-18 NOTE — PROGRESS NOTES
Hospitalist Progress Note      PCP: Akash Troy MD    Date of Admission: 7/12/2020    Chief Complaint: SOB    Hospital Course: 80 y.o. male With medical history including atrial fibrillation, hypertension, hyperlipidemia presents with complaints of shortness of breath. Patient recently underwent surgical repair of right hip fracture on 7/6/2020. In the emergency department patient was found to be hypoxic requiring support with BiPAP to maintain adequate oxygenation. Laboratory work-up notable for marked leukocytosis. Imaging demonstrated multifocal opacities although these are seen on previous imaging    7/13  Patient CT scan shows bilateral pleural effusions along with bilateral opacities. Patient presented on 3 L oxygen saturating 88%. He required CPAP for a second and a nonrebreather and he is now on 6 L. Patient was febrile up to 103 which is since come down to 98. Is proBNP is actually lower than it was before, his creatinine is stable at 1.5. Patient's white count has gone up from 14-19. His troponin is also slightly elevated 0.04. Blood cultures have been drawn. His COVID-19 test returned negative. I have added on a another COVID-19 test but this time PCR. Pulmonology was also consulted    7/14 Patient remains stable on 6L NC. Still alert and answering questions. Awaiting AM labs. Broadened abx yesterday. 7/15  Patient appears to be more lethargic today than he was yesterday. Patient's creatinine remaining stable along with his sodium level. Procalcitonin is trending down with IV antibiotics    7/16 Oxygen demand fluctuates. Overnight up to 15L but now he is on 4L with no intervention. No acute distress. CXR with worsening pulm edema. Will discuss with Pulm giving some lasix. 7/17 Patient stable overnight on 4-5L of oxygen. Procal continues to trend down and WBC stable. Renal function stable after one dose of lasix yesterday. PT/OT ordered to assess for placement. Subjective: Patient seen and examined. Nurse at bedside feeding him his pills without issue. Encouraged patient to get up and into chair. Down to 2L of oxygen and still saturating well. Will give another dose of lasix today as renal function staying stable. Call family at 11:30AM for discussion    Medications:  Reviewed    Infusion Medications   Scheduled Medications    mupirocin   Topical BID    methylPREDNISolone  40 mg Intravenous Daily    amiodarone  200 mg Oral Daily    amitriptyline  12.5 mg Oral Nightly    aspirin EC  81 mg Oral BID    divalproex  250 mg Oral Nightly    gabapentin  100 mg Oral BID    metoprolol succinate  50 mg Oral Daily    tamsulosin  0.4 mg Oral Daily    sodium chloride flush  10 mL Intravenous 2 times per day    enoxaparin  40 mg Subcutaneous Daily    cefepime  2 g Intravenous Q24H    vancomycin (VANCOCIN) intermittent dosing (placeholder)   Other RX Placeholder     PRN Meds: melatonin, HYDROcodone-acetaminophen, sodium chloride flush, acetaminophen **OR** acetaminophen, polyethylene glycol, promethazine **OR** ondansetron      Intake/Output Summary (Last 24 hours) at 7/18/2020 0852  Last data filed at 7/18/2020 0936  Gross per 24 hour   Intake 120 ml   Output --   Net 120 ml       Physical Exam Performed:    BP (!) 160/71   Pulse 66   Temp 97.4 °F (36.3 °C) (Oral)   Resp 16   Ht 5' 8\" (1.727 m)   Wt 153 lb 14.1 oz (69.8 kg)   SpO2 96%   BMI 23.40 kg/m²     General appearance: No apparent distress, appears stated age and cooperative. HEENT: Pupils equal, round, and reactive to light. Conjunctivae/corneas clear. Neck: Supple, with full range of motion. No jugular venous distention. Trachea midline. Respiratory:  Normal respiratory effort. Diminished breath sounds bilateral   cardiovascular: Regular rate and rhythm with normal S1/S2   Abdomen: Soft, non-tender, non-distended with normal bowel sounds. Musculoskeletal: No clubbing, cyanosis or edema bilaterally. Skin: Skin color, texture, turgor normal.  No rashes or lesions. Neurologic:  Neurovascularly intact without any focal sensory/motor deficits. Cranial nerves: II-XII intact, grossly non-focal.  Psychiatric: Alert  Capillary Refill: Brisk,< 3 seconds   Peripheral Pulses: +2 palpable, equal bilaterally       Labs:   Recent Labs     07/16/20  0807 07/17/20  0536 07/18/20  0533   WBC 23.0* 22.6* 24.6*   HGB 10.2* 9.8* 9.8*   HCT 33.5* 32.1* 30.4*    351 421     Recent Labs     07/16/20  0639 07/17/20  0536 07/18/20  0533    144 145   K 4.7 4.6 4.4   * 109 110   CO2 22 22 25   BUN 45* 46* 44*   CREATININE 1.4* 1.5* 1.4*   CALCIUM 8.2* 8.3 8.6   PHOS 3.4 3.5 3.3     No results for input(s): AST, ALT, BILIDIR, BILITOT, ALKPHOS in the last 72 hours. Recent Labs     07/17/20  0536   INR 1.12     No results for input(s): Daleen Cocks in the last 72 hours. Urinalysis:      Lab Results   Component Value Date    NITRU Negative 07/01/2020    WBCUA 1 07/01/2020    RBCUA 1 07/01/2020    BLOODU Negative 07/01/2020    SPECGRAV 1.013 07/01/2020    GLUCOSEU Negative 07/01/2020       Radiology:  XR CHEST PORTABLE   Final Result   1. Worsening bilateral lung infiltrates with associated bilateral pleural   effusions. XR CHEST PORTABLE   Final Result   Continued bilateral pleural effusions and bilateral airspace disease, with   shifting areas of consolidation. This suggests at least a component of   pulmonary edema. Pneumonia and ARDS remain differential possibilities. CT CHEST PULMONARY EMBOLISM W CONTRAST   Final Result   Negative for acute pulmonary embolism. Respiratory motion artifact limits   evaluation to the segmental level. Features of heart failure with moderate volume pleural effusions.       Progressive multifocal airspace disease may in part represent asymmetric   pulmonary edema, however cannot exclude superimposed infection (including   atypical or viral pathogens) or ARDS.      Enlarged branch main pulmonary arteries may be seen with pulmonary   hypertension. CT HEAD WO CONTRAST   Final Result   1. No acute intracranial abnormality. 2. Cerebral and cerebellar parenchymal volume loss with chronic microvascular   white matter ischemic disease. XR CHEST 1 VW   Final Result   No interval change in bilateral multifocal airspace opacities. Small right   pleural effusion. Assessment/Plan:    Sepsis  Temperature, heart rate, respirations, source lungs  Lactic acid normal  Hold on IV fluids  Vancomycin and cefepime D5  Reorder COVID-19: neg  resolved    Acute respiratory failure with hypoxia  Titrate oxygen to keep saturations above 90%  Down to 2 L  Pulmonology following  Another dose of lasix today    HCAP  Procalcitonin elevated but trending down  Cefepime and Vanc due to recent hospitalization  Recheck COVID-19 PCR also negative  Strep and Legionella negative, MRSA nasal probe positive  resp cult ordered, fungal cultures    Atrial fibrillation  Continue home amiodarone    Chronic Kidney Disease Stage 3  Stable  No need for further intervention  monitor    DVT Prophylaxis: lovenox  Diet: DIET GENERAL; Low Sodium (2 GM);  Daily Fluid Restriction: 2000 ml  Dietary Nutrition Supplements: Standard High Calorie Oral Supplement  Code Status: DNR-CCA    PT/OT Eval Status: 3-5 sessions per week    Edmundo Burleson MD

## 2020-07-18 NOTE — PROGRESS NOTES
amiodarone  200 mg Oral Daily    amitriptyline  12.5 mg Oral Nightly    aspirin EC  81 mg Oral BID    divalproex  250 mg Oral Nightly    gabapentin  100 mg Oral BID    metoprolol succinate  50 mg Oral Daily    tamsulosin  0.4 mg Oral Daily    sodium chloride flush  10 mL Intravenous 2 times per day    enoxaparin  40 mg Subcutaneous Daily    cefepime  2 g Intravenous Q24H    vancomycin (VANCOCIN) intermittent dosing (placeholder)   Other RX Placeholder       Continuous Infusions:      PRN Meds:  melatonin, HYDROcodone-acetaminophen, sodium chloride flush, acetaminophen **OR** acetaminophen, polyethylene glycol, promethazine **OR** ondansetron    Labs reviewed:  CBC:   Recent Labs     07/16/20  0807 07/17/20  0536 07/18/20  0533   WBC 23.0* 22.6* 24.6*   HGB 10.2* 9.8* 9.8*   HCT 33.5* 32.1* 30.4*   .9* 101.2* 95.3    351 421     BMP:   Recent Labs     07/16/20  0639 07/17/20  0536 07/18/20  0533    144 145   K 4.7 4.6 4.4   * 109 110   CO2 22 22 25   PHOS 3.4 3.5 3.3   BUN 45* 46* 44*   CREATININE 1.4* 1.5* 1.4*     LIVER PROFILE: No results for input(s): AST, ALT, LIPASE, BILIDIR, BILITOT, ALKPHOS in the last 72 hours. Invalid input(s): AMYLASE,  ALB  PT/INR:   Recent Labs     07/17/20 0536   PROTIME 13.0   INR 1.12     APTT: No results for input(s): APTT in the last 72 hours. UA:No results for input(s): NITRITE, COLORU, PHUR, LABCAST, WBCUA, RBCUA, MUCUS, TRICHOMONAS, YEAST, BACTERIA, CLARITYU, SPECGRAV, LEUKOCYTESUR, UROBILINOGEN, BILIRUBINUR, BLOODU, GLUCOSEU, AMORPHOUS in the last 72 hours. Invalid input(s): Fritz Ramal  No results for input(s): PH, PCO2, PO2 in the last 72 hours. Films:  Radiology Review:  Pertinent images / reports were reviewed as a part of this visit. CT Chest w/ contrast: No results found for this or any previous visit.     CT Chest w/o contrast:   Results for orders placed during the hospital encounter of 07/01/20   CT CHEST [facility] Narrative EXAMINATION:  CT OF THE CHEST WITHOUT CONTRAST 7/2/2020 11:43 am    TECHNIQUE:  CT of the chest was performed without the administration of intravenous  contrast. Multiplanar reformatted images are provided for review. Dose  modulation, iterative reconstruction, and/or weight based adjustment of the  mA/kV was utilized to reduce the radiation dose to as low as reasonably  achievable. COMPARISON:  None. HISTORY:  ORDERING SYSTEM PROVIDED HISTORY: RLL airspace disease. Effusion and/or  contusion (he fell at nursing home). TECHNOLOGIST PROVIDED HISTORY:  Reason for exam:->RLL airspace disease. Effusion and/or contusion (he fell  at nursing home). Reason for Exam: RLL airspace disease. Effusion and/or contusion (he fell at  nursing home). Acuity: Acute  Type of Exam: Initial    FINDINGS:  Mediastinum: Thyroid gland appears normal.  Scattered small mediastinal nodes  are noted. Coronary artery calcification is seen. Aortic valve  calcification is seen. Small hiatal hernia seen. There is nonspecific  thickening at the GE junction    Lungs/pleura: Moderate right and small left-sided pleural effusion is seen. On the right, hazy ground-glass and parenchymal opacity seen in the right  upper lobe, right middle lobe, with more focal consolidation right lower  lobe. Scattered septal thickening is seen on the right. On the left hazy ground-glass and parenchymal opacity seen in left upper lobe  with more focal consolidation in the left lower lobe    Upper Abdomen: Adrenal glands appear normal.  There is body wall anasarca. Soft Tissues/Bones: Spurring is seen in the spine. Spurring is seen in the  shoulder joints. Chronic wedging of lower thoracic spine vertebral body is  seen. .  Remote appearing rib fractures are seen. Impression Moderate bilateral pleural effusions and bilateral airspace disease with  superimposed atelectasis or pneumonia at the lung bases.   Scattered areas of  septal thickening are seen, compatible with a component of fluid  overload-pulmonary edema         CTPA:   Results for orders placed during the hospital encounter of 07/12/20   CT CHEST PULMONARY EMBOLISM W CONTRAST    Narrative EXAMINATION:  CTA OF THE CHEST 7/12/2020 10:04 pm    TECHNIQUE:  CTA of the chest was performed after the administration of intravenous  contrast.  Multiplanar reformatted images are provided for review. MIP  images are provided for review. Dose modulation, iterative reconstruction,  and/or weight based adjustment of the mA/kV was utilized to reduce the  radiation dose to as low as reasonably achievable. COMPARISON:  Chest radiograph 07/12/2020; CT chest 07/02/2020    HISTORY:  ORDERING SYSTEM PROVIDED HISTORY: SOB, recent hip sx  TECHNOLOGIST PROVIDED HISTORY:  Reason for exam:->SOB, recent hip sx  Reason for Exam: sob recent hip fx    FINDINGS:  Pulmonary Arteries: Pulmonary arteries are adequately opacified for  evaluation. Motion artifact limits evaluation to the segmental level. No  evidence of intraluminal filling defect to suggest pulmonary embolism. Main  pulmonary artery is obscured by motion, however the branch main pulmonary  arteries are enlarged, measuring 3.3 cm on the right and 2.9 cm on the left. Mediastinum: No appreciable lymphadenopathy on this limited exam.  Mild  cardiomegaly. No pericardial effusion. No significant coronary  calcifications. Unremarkable esophagus and thyroid. Normal caliber thoracic  aorta without evidence of aneurysm or dissection. Lungs/pleura: No pneumothorax. Moderate pleural effusions with associated  atelectasis. Additional multifocal ground-glass and consolidative airspace  disease with areas of air bronchograms within the upper and lower lobes. Some dependent secretions suspected along the upper trachea. Upper Abdomen: Included upper abdominal contents are unremarkable for any  acute findings.   Cholelithiasis versus vicarious excretion of contrast in the  gallbladder. Soft Tissues/Bones: No acute soft tissue or osseous abnormality. Thoracic  spondylosis. Impression Negative for acute pulmonary embolism. Respiratory motion artifact limits  evaluation to the segmental level. Features of heart failure with moderate volume pleural effusions. Progressive multifocal airspace disease may in part represent asymmetric  pulmonary edema, however cannot exclude superimposed infection (including  atypical or viral pathogens) or ARDS. Enlarged branch main pulmonary arteries may be seen with pulmonary  hypertension. CXR PA/LAT: No results found for this or any previous visit. CXR portable:   Results for orders placed during the hospital encounter of 07/12/20   XR CHEST PORTABLE    Narrative EXAMINATION:  ONE XRAY VIEW OF THE CHEST    7/15/2020 8:54 pm    COMPARISON:  CT chest, 07/12/2020    HISTORY:  ORDERING SYSTEM PROVIDED HISTORY: SOB  TECHNOLOGIST PROVIDED HISTORY:  Reason for exam:->SOB  Reason for Exam: sob    FINDINGS:  The cardiac silhouette is normal.  Thoracic aortic calcification is noted. Hilar contours are normal.    There are hazy, alveolar opacities in the mid and lower lungs, with slight  consolidation in the right base. Aeration of the apical right upper lobe is  improved in the interval.  Bilateral pleural effusions are present. No  pneumothorax is seen. Impression Continued bilateral pleural effusions and bilateral airspace disease, with  shifting areas of consolidation. This suggests at least a component of  pulmonary edema. Pneumonia and ARDS remain differential possibilities. This note was transcribed using [id number] [employer]OVIA. Please disregard any translational errors.     Thank you for this consult,    Fran Alexander [address] [place] [facility]

## 2020-07-18 NOTE — CARE COORDINATION
SW received voicemail overnight stating that patient was accepted at [facility]. Pre-cert was accepted. Respectfully submitted,    RADHA Ames  [facility]   [phone]    Electronically signed by RADHA Fu on 7/18/2020 at 8:50 AM

## 2020-07-18 NOTE — PLAN OF CARE
Problem: Falls - Risk of:  Goal: Will remain free from falls  Description: Will remain free from falls  7/18/2020 0314 by Jerod Adams RN  Outcome: Ongoing  7/17/2020 1837 by Cherry Gandara RN  Outcome: Ongoing  Goal: Absence of physical injury  Description: Absence of physical injury  7/18/2020 0314 by Jerod Adams RN  Outcome: Ongoing  7/17/2020 1837 by Cherry Gandara RN  Outcome: Ongoing

## 2020-07-19 LAB
ALBUMIN SERPL-MCNC: 2.8 G/DL (ref 3.4–5)
ANION GAP SERPL CALCULATED.3IONS-SCNC: 9 MMOL/L (ref 3–16)
ASPERGILLUS ANTIBODY ID: NORMAL
BASOPHILS ABSOLUTE: 0 K/UL (ref 0–0.2)
BASOPHILS RELATIVE PERCENT: 0.1 %
BLASTOMYCES ANTIBODY ID: NORMAL
BUN BLDV-MCNC: 46 MG/DL (ref 7–20)
CALCIUM SERPL-MCNC: 8.2 MG/DL (ref 8.3–10.6)
CHLORIDE BLD-SCNC: 106 MMOL/L (ref 99–110)
CO2: 26 MMOL/L (ref 21–32)
COCCIDIOIDES ANTIBODY ID: NORMAL
CREAT SERPL-MCNC: 1.3 MG/DL (ref 0.8–1.3)
EOSINOPHILS ABSOLUTE: 0 K/UL (ref 0–0.6)
EOSINOPHILS RELATIVE PERCENT: 0.1 %
GFR AFRICAN AMERICAN: >60
GFR NON-AFRICAN AMERICAN: 52
GLUCOSE BLD-MCNC: 125 MG/DL (ref 70–99)
HCT VFR BLD CALC: 31.1 % (ref 40.5–52.5)
HEMOGLOBIN: 9.8 G/DL (ref 13.5–17.5)
HISTOPLASMA ABS, ID: NORMAL
LYMPHOCYTES ABSOLUTE: 1 K/UL (ref 1–5.1)
LYMPHOCYTES RELATIVE PERCENT: 4 %
MCH RBC QN AUTO: 30.2 PG (ref 26–34)
MCHC RBC AUTO-ENTMCNC: 31.4 G/DL (ref 31–36)
MCV RBC AUTO: 96.2 FL (ref 80–100)
MONOCYTES ABSOLUTE: 1.7 K/UL (ref 0–1.3)
MONOCYTES RELATIVE PERCENT: 6.7 %
NEUTROPHILS ABSOLUTE: 22.4 K/UL (ref 1.7–7.7)
NEUTROPHILS RELATIVE PERCENT: 89.1 %
PDW BLD-RTO: 14.8 % (ref 12.4–15.4)
PHOSPHORUS: 3.6 MG/DL (ref 2.5–4.9)
PLATELET # BLD: 375 K/UL (ref 135–450)
PMV BLD AUTO: 8.6 FL (ref 5–10.5)
POTASSIUM SERPL-SCNC: 4.1 MMOL/L (ref 3.5–5.1)
RBC # BLD: 3.23 M/UL (ref 4.2–5.9)
SODIUM BLD-SCNC: 141 MMOL/L (ref 136–145)
VANCOMYCIN RANDOM: 16 UG/ML
WBC # BLD: 25.1 K/UL (ref 4–11)

## 2020-07-19 PROCEDURE — 36415 COLL VENOUS BLD VENIPUNCTURE: CPT

## 2020-07-19 PROCEDURE — 6370000000 HC RX 637 (ALT 250 FOR IP): Performed by: NURSE PRACTITIONER

## 2020-07-19 PROCEDURE — 2700000000 HC OXYGEN THERAPY PER DAY

## 2020-07-19 PROCEDURE — 85025 COMPLETE CBC W/AUTO DIFF WBC: CPT

## 2020-07-19 PROCEDURE — 2580000003 HC RX 258: Performed by: INTERNAL MEDICINE

## 2020-07-19 PROCEDURE — 2580000003 HC RX 258: Performed by: PEDIATRICS

## 2020-07-19 PROCEDURE — 80069 RENAL FUNCTION PANEL: CPT

## 2020-07-19 PROCEDURE — 6360000002 HC RX W HCPCS: Performed by: INTERNAL MEDICINE

## 2020-07-19 PROCEDURE — 99232 SBSQ HOSP IP/OBS MODERATE 35: CPT | Performed by: INTERNAL MEDICINE

## 2020-07-19 PROCEDURE — 94761 N-INVAS EAR/PLS OXIMETRY MLT: CPT

## 2020-07-19 PROCEDURE — 6360000002 HC RX W HCPCS: Performed by: PEDIATRICS

## 2020-07-19 PROCEDURE — 80202 ASSAY OF VANCOMYCIN: CPT

## 2020-07-19 PROCEDURE — 2060000000 HC ICU INTERMEDIATE R&B

## 2020-07-19 PROCEDURE — 6370000000 HC RX 637 (ALT 250 FOR IP): Performed by: PEDIATRICS

## 2020-07-19 RX ADMIN — SODIUM CHLORIDE, PRESERVATIVE FREE 10 ML: 5 INJECTION INTRAVENOUS at 09:27

## 2020-07-19 RX ADMIN — GABAPENTIN 100 MG: 100 CAPSULE ORAL at 09:26

## 2020-07-19 RX ADMIN — ASPIRIN 81 MG: 81 TABLET, COATED ORAL at 21:41

## 2020-07-19 RX ADMIN — MELATONIN 3 MG: at 21:41

## 2020-07-19 RX ADMIN — MUPIROCIN: 20 OINTMENT TOPICAL at 21:40

## 2020-07-19 RX ADMIN — AMIODARONE HYDROCHLORIDE 200 MG: 200 TABLET ORAL at 09:26

## 2020-07-19 RX ADMIN — METHYLPREDNISOLONE SODIUM SUCCINATE 40 MG: 40 INJECTION, POWDER, FOR SOLUTION INTRAMUSCULAR; INTRAVENOUS at 09:27

## 2020-07-19 RX ADMIN — DIVALPROEX SODIUM 250 MG: 125 CAPSULE ORAL at 21:41

## 2020-07-19 RX ADMIN — AMITRIPTYLINE HYDROCHLORIDE 12.5 MG: 25 TABLET, FILM COATED ORAL at 21:42

## 2020-07-19 RX ADMIN — METOPROLOL SUCCINATE 50 MG: 50 TABLET, EXTENDED RELEASE ORAL at 09:26

## 2020-07-19 RX ADMIN — CEFEPIME HYDROCHLORIDE 2 G: 2 INJECTION, POWDER, FOR SOLUTION INTRAVENOUS at 16:51

## 2020-07-19 RX ADMIN — VANCOMYCIN HYDROCHLORIDE 750 MG: 750 INJECTION, POWDER, LYOPHILIZED, FOR SOLUTION INTRAVENOUS at 04:15

## 2020-07-19 RX ADMIN — GABAPENTIN 100 MG: 100 CAPSULE ORAL at 21:41

## 2020-07-19 RX ADMIN — ASPIRIN 81 MG: 81 TABLET, COATED ORAL at 09:26

## 2020-07-19 RX ADMIN — MUPIROCIN: 20 OINTMENT TOPICAL at 09:26

## 2020-07-19 RX ADMIN — TAMSULOSIN HYDROCHLORIDE 0.4 MG: 0.4 CAPSULE ORAL at 09:26

## 2020-07-19 RX ADMIN — SODIUM CHLORIDE, PRESERVATIVE FREE 10 ML: 5 INJECTION INTRAVENOUS at 21:40

## 2020-07-19 RX ADMIN — ENOXAPARIN SODIUM 40 MG: 40 INJECTION SUBCUTANEOUS at 09:27

## 2020-07-19 ASSESSMENT — PAIN SCALES - GENERAL
PAINLEVEL_OUTOF10: 0

## 2020-07-19 ASSESSMENT — PAIN SCALES - WONG BAKER
WONGBAKER_NUMERICALRESPONSE: 0

## 2020-07-19 NOTE — PROGRESS NOTES
Pulmonary Progress Note    Date of Admission: 7/12/2020   LOS: 7 days     Chief Complaint   Patient presents with    Shortness of Breath     Pt came in via EMS with worsening shortness of breath and hypoxia throughtout the day. EMS reports pt \"had oxygen saturation in 70's on arrival\". 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 24 Hour Events/Subjective  No events overnight, patient on 2 L oxygen. ROS:   No nausea  No Vomiting  No chest pain      Intake/Output Summary (Last 24 hours) at 7/19/2020 1332  Last data filed at 7/19/2020 1114  Gross per 24 hour   Intake 294 ml   Output 350 ml   Net -56 ml         PHYSICAL EXAM:   Blood pressure 129/76, pulse 63, temperature 97.5 °F (36.4 °C), temperature source Oral, resp. rate 16, height 5' 8\" (1.727 m), weight 148 lb 9.4 oz (67.4 kg), SpO2 95 %.'  Gen:  No acute distress. Eyes: PERRL. Anicteric sclera. No conjunctival injection. ENT: No discharge. Posterior oropharynx clear. External appearance of ears and nose normal.  Neck: Trachea midline. No mass   Resp:  No crackles. No wheezes. No rhonchi. No dullness on percussion. CV: Regular rate. Regular rhythm. No murmur or rub. No edema. GI: Soft, Non-tender. Non-distended. +BS  Skin: Warm, dry, w/o erythema. Lymph: No cervical or supraclavicular LAD. M/S: No cyanosis. No clubbing. Neuro: no focal neurologic deficit.   Moves all extremities      Medications:    Scheduled Meds:   mupirocin   Topical BID    amiodarone  200 mg Oral Daily    amitriptyline  12.5 mg Oral Nightly    aspirin EC CTPA:   Results for orders placed during the hospital encounter of 07/12/20   CT CHEST PULMONARY EMBOLISM W CONTRAST    Narrative EXAMINATION:  CTA OF THE CHEST 7/12/2020 10:04 pm    TECHNIQUE:  CTA of the chest was performed after the administration of intravenous  contrast.  Multiplanar reformatted images are provided for review. MIP  images are provided for review. Dose modulation, iterative reconstruction,  and/or weight based adjustment of the mA/kV was utilized to reduce the  radiation dose to as low as reasonably achievable. COMPARISON:  Chest radiograph 07/12/2020; CT chest 07/02/2020    HISTORY:  ORDERING SYSTEM PROVIDED HISTORY: SOB, recent hip sx  TECHNOLOGIST PROVIDED HISTORY:  Reason for exam:->SOB, recent hip sx  Reason for Exam: sob recent hip fx    FINDINGS:  Pulmonary Arteries: Pulmonary arteries are adequately opacified for  evaluation. Motion artifact limits evaluation to the segmental level. No  evidence of intraluminal filling defect to suggest pulmonary embolism. Main  pulmonary artery is obscured by motion, however the branch main pulmonary  arteries are enlarged, measuring 3.3 cm on the right and 2.9 cm on the left. Mediastinum: No appreciable lymphadenopathy on this limited exam.  Mild  cardiomegaly. No pericardial effusion. No significant coronary  calcifications. Unremarkable esophagus and thyroid. Normal caliber thoracic  aorta without evidence of aneurysm or dissection. Lungs/pleura: No pneumothorax. Moderate pleural effusions with associated  atelectasis. Additional multifocal ground-glass and consolidative airspace  disease with areas of air bronchograms within the upper and lower lobes. Some dependent secretions suspected along the upper trachea. Upper Abdomen: Included upper abdominal contents are unremarkable for any  acute findings. Cholelithiasis versus vicarious excretion of contrast in the  gallbladder.     Soft Tissues/Bones: No acute soft tissue or osseous abnormality. Thoracic  spondylosis. Impression Negative for acute pulmonary embolism. Respiratory motion artifact limits  evaluation to the segmental level. Features of heart failure with moderate volume pleural effusions. Progressive multifocal airspace disease may in part represent asymmetric  pulmonary edema, however cannot exclude superimposed infection (including  atypical or viral pathogens) or ARDS. Enlarged branch main pulmonary arteries may be seen with pulmonary  hypertension. CXR PA/LAT: No results found for this or any previous visit. CXR portable:   Results for orders placed during the hospital encounter of 07/12/20   XR CHEST PORTABLE    Narrative EXAMINATION:  ONE XRAY VIEW OF THE CHEST    7/15/2020 8:54 pm    COMPARISON:  CT chest, 07/12/2020    HISTORY:  ORDERING SYSTEM PROVIDED HISTORY: SOB  TECHNOLOGIST PROVIDED HISTORY:  Reason for exam:->SOB  Reason for Exam: sob    FINDINGS:  The cardiac silhouette is normal.  Thoracic aortic calcification is noted. Hilar contours are normal.    There are hazy, alveolar opacities in the mid and lower lungs, with slight  consolidation in the right base. Aeration of the apical right upper lobe is  improved in the interval.  Bilateral pleural effusions are present. No  pneumothorax is seen. Impression Continued bilateral pleural effusions and bilateral airspace disease, with  shifting areas of consolidation. This suggests at least a component of  pulmonary edema. Pneumonia and ARDS remain differential possibilities. This note was transcribed using [id number] [employer]. Please disregard any translational errors.     Thank you for this consult,    Fran Alexander [facility]

## 2020-07-19 NOTE — PROGRESS NOTES
Clinical Pharmacy Note  Vancomycin Consult    Alicia Delacruz is a 80 y.o. male ordered Vancomycin for Pneumonia; consult received from Dr. Marisa Kurtz to manage therapy. Also receiving cefepime . Patient Active Problem List   Diagnosis    Hypertension, benign    Osteopenia    Spinal stenosis    Mixed hyperlipidemia    Radial nerve injury    Hip fracture, left (HCC)    Arthritis of left knee    Trigger finger, acquired    Paroxysmal atrial fibrillation ([facility])    Frequent PVCs    Alcohol abuse    Hypokalemia    Syncope and collapse    Hyperlipidemia    PAF (paroxysmal atrial fibrillation) ([facility])    Essential hypertension    Macrocytic anemia    Closed fracture of ramus of left pubis ([facility])    Contusion of left hip    SVT (supraventricular tachycardia) ([facility])    Accidental fall    Closed head injury with concussion    Dementia ([facility])    Hyponatremia    Scalp laceration    Tachycardia    Closed nondisplaced fracture of proximal phalanx of left ring finger    Compression fracture of T12 vertebra ([facility])    Hyponatremia    Closed fracture of neck of right femur ([facility])    Closed fracture of right hip ([facility])    Acute respiratory failure with hypoxia ([facility])    COVID-19    Multifocal pneumonia    Pleural effusion, bilateral    Abnormal chest CT       Allergies:  Patient has no known allergies.      Temp max:  Temp (24hrs), Av.3 °F (36.3 °C), Min:97 °F (36.1 °C), Max:97.6 °F (36.4 °C)      Recent Labs     20  0807 20  0536 20  0533   WBC 23.0* 22.6* 24.6*       Recent Labs     20  0639 20  0536 20  0533   BUN 45* 46* 44*   CREATININE 1.4* 1.5* 1.4*         Intake/Output Summary (Last 24 hours) at 2020 0320  Last data filed at 2020  Gross per 24 hour   Intake 64 ml   Output 200 ml   Net -136 ml       Culture Results:      Ht Readings from Last 1 Encounters:   20 5' 8\" (1.727 m)        Wt Readings from Last 1 Encounters:   20 153 lb

## 2020-07-19 NOTE — PLAN OF CARE
Pt is a high fall risk. Yellow socks exchanged for red socks since pt's [place] > 70. Fall risk bracelet placed on pt. Fall risk dome light on outside pt's room. Bed in locked, low position with side rails up X 3 and an active bed alarm. Pt's is beginning to show some redness in his groin folds between his scrotum and leg. Zinc oxide paste applied after each episode of incontinence. Pt turned Q2-3H while awake. B heels floated using pillows. Pt has poor PO intake. He refuses drinking or eating when offered. Pt said he would like to try vanilla ice cream with his medication pass. With med pass, pt was only willing to take 2 bites of ice cream with crushed meds. He needed encouragement to take the 3rd bite to complete the med administration. Pt is a.o. X 2. He sometimes speaks when spoken to. He does not engage in conversation and appears withdrawn. Pt is [place].

## 2020-07-19 NOTE — PROGRESS NOTES
Pt got a bath, oral care and complete linen change. Pt moved to the chair by [employer] to sit up for a while. Call light in reach and chair alarm in place.

## 2020-07-19 NOTE — PLAN OF CARE
Problem: Falls - Risk of:  Goal: Will remain free from falls  Description: Will remain free from falls  7/19/2020 0953 by Sunni Tran RN  Outcome: Ongoing     Problem: Falls - Risk of:  Goal: Absence of physical injury  Description: Absence of physical injury  7/19/2020 0953 by Sunni Tran RN  Outcome: Ongoing     Problem: Skin Integrity:  Goal: Will show no infection signs and symptoms  Description: Will show no infection signs and symptoms  7/19/2020 0953 by Sunni Tran RN  Outcome: Ongoing     Problem: Skin Integrity:  Goal: Absence of new skin breakdown  Description: Absence of new skin breakdown  7/19/2020 0953 by Sunni Tran RN  Outcome: Ongoing     Problem: Nutrition  Goal: Optimal nutrition therapy  7/19/2020 0953 by Sunni Tran RN  Outcome: Ongoing

## 2020-07-19 NOTE — PROGRESS NOTES
Hospitalist Progress Note      PCP: Fausto George MD    Date of Admission: 7/12/2020    Chief Complaint: SOB    Hospital Course: 80 y.o. male With medical history including atrial fibrillation, hypertension, hyperlipidemia presents with complaints of shortness of breath. Patient recently underwent surgical repair of right hip fracture on 7/6/2020. In the emergency department patient was found to be hypoxic requiring support with BiPAP to maintain adequate oxygenation. Laboratory work-up notable for marked leukocytosis. Imaging demonstrated multifocal opacities although these are seen on previous imaging    7/13  Patient CT scan shows bilateral pleural effusions along with bilateral opacities. Patient presented on 3 L oxygen saturating 88%. He required CPAP for a second and a nonrebreather and he is now on 6 L. Patient was febrile up to 103 which is since come down to 98. Is proBNP is actually lower than it was before, his creatinine is stable at 1.5. Patient's white count has gone up from 14-19. His troponin is also slightly elevated 0.04. Blood cultures have been drawn. His COVID-19 test returned negative. I have added on a another COVID-19 test but this time PCR. Pulmonology was also consulted    7/14 Patient remains stable on 6L NC. Still alert and answering questions. Awaiting AM labs. Broadened abx yesterday. 7/15  Patient appears to be more lethargic today than he was yesterday. Patient's creatinine remaining stable along with his sodium level. Procalcitonin is trending down with IV antibiotics    7/16 Oxygen demand fluctuates. Overnight up to 15L but now he is on 4L with no intervention. No acute distress. CXR with worsening pulm edema. Will discuss with Pulm giving some lasix. 7/17 Patient stable overnight on 4-5L of oxygen. Procal continues to trend down and WBC stable. Renal function stable after one dose of lasix yesterday. PT/OT ordered to assess for placement.      7/18 Nurse at bedside feeding him his pills without issue. Encouraged patient to get up and into chair. Down to 2L of oxygen and still saturating well. Will give another dose of lasix today as renal function staying stable. Call family at 11:30AM for discussion. Spoke with family for >30 minutes on phone in regards to care plan. They understand severity of illness, need for rehab. Subjective: Patient seen and examined. Communicating but not speaking. Answering questions with head nods and thumbs up/down. Still on 2L. Doing well. Encouraged ambulation today. Medications:  Reviewed    Infusion Medications   Scheduled Medications    mupirocin   Topical BID    methylPREDNISolone  40 mg Intravenous Daily    amiodarone  200 mg Oral Daily    amitriptyline  12.5 mg Oral Nightly    aspirin EC  81 mg Oral BID    divalproex  250 mg Oral Nightly    gabapentin  100 mg Oral BID    metoprolol succinate  50 mg Oral Daily    tamsulosin  0.4 mg Oral Daily    sodium chloride flush  10 mL Intravenous 2 times per day    enoxaparin  40 mg Subcutaneous Daily    cefepime  2 g Intravenous Q24H    vancomycin (VANCOCIN) intermittent dosing (placeholder)   Other RX Placeholder     PRN Meds: melatonin, HYDROcodone-acetaminophen, sodium chloride flush, acetaminophen **OR** acetaminophen, polyethylene glycol, promethazine **OR** ondansetron      Intake/Output Summary (Last 24 hours) at 7/19/2020 0742  Last data filed at 7/19/2020 0600  Gross per 24 hour   Intake 314 ml   Output 200 ml   Net 114 ml       Physical Exam Performed:    BP (!) 149/73   Pulse 58   Temp 97.5 °F (36.4 °C) (Oral)   Resp 18   Ht 5' 8\" (1.727 m)   Wt 148 lb 9.4 oz (67.4 kg)   SpO2 98%   BMI 22.59 kg/m²     General appearance: No apparent distress, appears stated age and cooperative. HEENT: Pupils equal, round, and reactive to light. Conjunctivae/corneas clear. Neck: Supple, with full range of motion. No jugular venous distention.  Trachea midline. Respiratory:  Normal respiratory effort. Diminished breath sounds bilateral   cardiovascular: Regular rate and rhythm with normal S1/S2   Abdomen: Soft, non-tender, non-distended with normal bowel sounds. Musculoskeletal: No clubbing, cyanosis or edema bilaterally. Skin: Skin color, texture, turgor normal.  No rashes or lesions. Neurologic:  Neurovascularly intact without any focal sensory/motor deficits. Cranial nerves: II-XII intact, grossly non-focal.  Psychiatric: Alert  Capillary Refill: Brisk,< 3 seconds   Peripheral Pulses: +2 palpable, equal bilaterally       Labs:   Recent Labs     07/16/20  0807 07/17/20  0536 07/18/20  0533   WBC 23.0* 22.6* 24.6*   HGB 10.2* 9.8* 9.8*   HCT 33.5* 32.1* 30.4*    351 421     Recent Labs     07/17/20  0536 07/18/20  0533    145   K 4.6 4.4    110   CO2 22 25   BUN 46* 44*   CREATININE 1.5* 1.4*   CALCIUM 8.3 8.6   PHOS 3.5 3.3     No results for input(s): AST, ALT, BILIDIR, BILITOT, ALKPHOS in the last 72 hours. Recent Labs     07/17/20  0536   INR 1.12     No results for input(s): Scarlet Hodgkins in the last 72 hours. Urinalysis:      Lab Results   Component Value Date    NITRU Negative 07/01/2020    WBCUA 1 07/01/2020    RBCUA 1 07/01/2020    BLOODU Negative 07/01/2020    SPECGRAV 1.013 07/01/2020    GLUCOSEU Negative 07/01/2020       Radiology:  XR CHEST PORTABLE   Final Result   1. Worsening bilateral lung infiltrates with associated bilateral pleural   effusions. XR CHEST PORTABLE   Final Result   Continued bilateral pleural effusions and bilateral airspace disease, with   shifting areas of consolidation. This suggests at least a component of   pulmonary edema. Pneumonia and ARDS remain differential possibilities. CT CHEST PULMONARY EMBOLISM W CONTRAST   Final Result   Negative for acute pulmonary embolism. Respiratory motion artifact limits   evaluation to the segmental level.       Features of heart failure with moderate volume pleural effusions. Progressive multifocal airspace disease may in part represent asymmetric   pulmonary edema, however cannot exclude superimposed infection (including   atypical or viral pathogens) or ARDS. Enlarged branch main pulmonary arteries may be seen with pulmonary   hypertension. CT HEAD WO CONTRAST   Final Result   1. No acute intracranial abnormality. 2. Cerebral and cerebellar parenchymal volume loss with chronic microvascular   white matter ischemic disease. XR CHEST 1 VW   Final Result   No interval change in bilateral multifocal airspace opacities. Small right   pleural effusion. Assessment/Plan:    Sepsis  Temperature, heart rate, respirations, source lungs  Lactic acid normal  Hold on IV fluids  Vancomycin and cefepime D7  Reorder COVID-19: neg  resolved    Acute respiratory failure with hypoxia  Titrate oxygen to keep saturations above 90%  Down to 2 L  Pulmonology following    HCAP  Procalcitonin elevated but trending down  Cefepime and Vanc due to recent hospitalization  Recheck COVID-19 PCR also negative  Strep and Legionella negative, MRSA nasal probe positive  resp cult ordered, fungal cultures    Atrial fibrillation  Continue home amiodarone    Chronic Kidney Disease Stage 3  Stable  No need for further intervention  monitor    DVT Prophylaxis: lovenox  Diet: DIET GENERAL; Low Sodium (2 GM);  Daily Fluid Restriction: 2000 ml  Dietary Nutrition Supplements: Standard High Calorie Oral Supplement  Code Status: DNR-CCA    PT/OT Eval Status: 3-5 sessions per week    Dispo - Inpt, nearing discharge    David Smallwood MD

## 2020-07-20 LAB
ALBUMIN SERPL-MCNC: 2.7 G/DL (ref 3.4–5)
ANION GAP SERPL CALCULATED.3IONS-SCNC: 9 MMOL/L (ref 3–16)
ANISOCYTOSIS: ABNORMAL
ATYPICAL LYMPHOCYTE RELATIVE PERCENT: 1 % (ref 0–6)
BASOPHILS ABSOLUTE: 0 K/UL (ref 0–0.2)
BASOPHILS RELATIVE PERCENT: 0 %
BUN BLDV-MCNC: 55 MG/DL (ref 7–20)
CALCIUM SERPL-MCNC: 8 MG/DL (ref 8.3–10.6)
CHLORIDE BLD-SCNC: 111 MMOL/L (ref 99–110)
CO2: 25 MMOL/L (ref 21–32)
CREAT SERPL-MCNC: 1.6 MG/DL (ref 0.8–1.3)
EOSINOPHILS ABSOLUTE: 0 K/UL (ref 0–0.6)
EOSINOPHILS RELATIVE PERCENT: 0 %
FERRITIN: 454.5 NG/ML (ref 30–400)
FOLATE: 7.14 NG/ML (ref 4.78–24.2)
GFR AFRICAN AMERICAN: 50
GFR NON-AFRICAN AMERICAN: 41
GLUCOSE BLD-MCNC: 110 MG/DL (ref 70–99)
HCT VFR BLD CALC: 31.7 % (ref 40.5–52.5)
HEMATOLOGY PATH CONSULT: NORMAL
HEMOGLOBIN: 9.9 G/DL (ref 13.5–17.5)
HYPERSEGMENTED NEUTROPHILS: PRESENT
IRON SATURATION: 53 % (ref 20–50)
IRON: 86 UG/DL (ref 59–158)
LYMPHOCYTES ABSOLUTE: 1.1 K/UL (ref 1–5.1)
LYMPHOCYTES RELATIVE PERCENT: 4 %
MCH RBC QN AUTO: 30 PG (ref 26–34)
MCHC RBC AUTO-ENTMCNC: 31.4 G/DL (ref 31–36)
MCV RBC AUTO: 95.6 FL (ref 80–100)
METAMYELOCYTES RELATIVE PERCENT: 3 %
MONOCYTES ABSOLUTE: 1.3 K/UL (ref 0–1.3)
MONOCYTES RELATIVE PERCENT: 6 %
NEUTROPHILS ABSOLUTE: 19 K/UL (ref 1.7–7.7)
NEUTROPHILS RELATIVE PERCENT: 86 %
PDW BLD-RTO: 15 % (ref 12.4–15.4)
PHOSPHORUS: 3.9 MG/DL (ref 2.5–4.9)
PLATELET # BLD: 354 K/UL (ref 135–450)
PLATELET SLIDE REVIEW: ADEQUATE
PMV BLD AUTO: 9 FL (ref 5–10.5)
POTASSIUM SERPL-SCNC: 4.4 MMOL/L (ref 3.5–5.1)
RBC # BLD: 3.31 M/UL (ref 4.2–5.9)
SLIDE REVIEW: ABNORMAL
SODIUM BLD-SCNC: 145 MMOL/L (ref 136–145)
TOTAL IRON BINDING CAPACITY: 161 UG/DL (ref 260–445)
TSH REFLEX: 3.31 UIU/ML (ref 0.27–4.2)
VANCOMYCIN RANDOM: 18.2 UG/ML
VITAMIN B-12: 722 PG/ML (ref 211–911)
WBC # BLD: 21.4 K/UL (ref 4–11)

## 2020-07-20 PROCEDURE — 84443 ASSAY THYROID STIM HORMONE: CPT

## 2020-07-20 PROCEDURE — 97110 THERAPEUTIC EXERCISES: CPT

## 2020-07-20 PROCEDURE — 85025 COMPLETE CBC W/AUTO DIFF WBC: CPT

## 2020-07-20 PROCEDURE — 82746 ASSAY OF FOLIC ACID SERUM: CPT

## 2020-07-20 PROCEDURE — 83540 ASSAY OF IRON: CPT

## 2020-07-20 PROCEDURE — 36415 COLL VENOUS BLD VENIPUNCTURE: CPT

## 2020-07-20 PROCEDURE — 97530 THERAPEUTIC ACTIVITIES: CPT

## 2020-07-20 PROCEDURE — 82728 ASSAY OF FERRITIN: CPT

## 2020-07-20 PROCEDURE — 82607 VITAMIN B-12: CPT

## 2020-07-20 PROCEDURE — 2580000003 HC RX 258: Performed by: PEDIATRICS

## 2020-07-20 PROCEDURE — 92610 EVALUATE SWALLOWING FUNCTION: CPT

## 2020-07-20 PROCEDURE — 6370000000 HC RX 637 (ALT 250 FOR IP): Performed by: NURSE PRACTITIONER

## 2020-07-20 PROCEDURE — 6370000000 HC RX 637 (ALT 250 FOR IP): Performed by: PEDIATRICS

## 2020-07-20 PROCEDURE — 83550 IRON BINDING TEST: CPT

## 2020-07-20 PROCEDURE — 6360000002 HC RX W HCPCS: Performed by: PEDIATRICS

## 2020-07-20 PROCEDURE — 80069 RENAL FUNCTION PANEL: CPT

## 2020-07-20 PROCEDURE — 80202 ASSAY OF VANCOMYCIN: CPT

## 2020-07-20 PROCEDURE — 2700000000 HC OXYGEN THERAPY PER DAY

## 2020-07-20 PROCEDURE — 94761 N-INVAS EAR/PLS OXIMETRY MLT: CPT

## 2020-07-20 PROCEDURE — 2060000000 HC ICU INTERMEDIATE R&B

## 2020-07-20 RX ORDER — ASPIRIN 81 MG/1
81 TABLET, CHEWABLE ORAL 2 TIMES DAILY
Status: DISCONTINUED | OUTPATIENT
Start: 2020-07-20 | End: 2020-07-21 | Stop reason: HOSPADM

## 2020-07-20 RX ADMIN — DIVALPROEX SODIUM 250 MG: 125 CAPSULE ORAL at 21:24

## 2020-07-20 RX ADMIN — MELATONIN 3 MG: at 21:24

## 2020-07-20 RX ADMIN — SODIUM CHLORIDE, PRESERVATIVE FREE 10 ML: 5 INJECTION INTRAVENOUS at 21:23

## 2020-07-20 RX ADMIN — AMIODARONE HYDROCHLORIDE 200 MG: 200 TABLET ORAL at 09:14

## 2020-07-20 RX ADMIN — GABAPENTIN 100 MG: 100 CAPSULE ORAL at 21:24

## 2020-07-20 RX ADMIN — ENOXAPARIN SODIUM 40 MG: 40 INJECTION SUBCUTANEOUS at 09:12

## 2020-07-20 RX ADMIN — SODIUM CHLORIDE, PRESERVATIVE FREE 10 ML: 5 INJECTION INTRAVENOUS at 09:11

## 2020-07-20 RX ADMIN — GABAPENTIN 100 MG: 100 CAPSULE ORAL at 09:14

## 2020-07-20 RX ADMIN — AMITRIPTYLINE HYDROCHLORIDE 12.5 MG: 25 TABLET, FILM COATED ORAL at 21:24

## 2020-07-20 RX ADMIN — ASPIRIN 81 MG: 81 TABLET, CHEWABLE ORAL at 21:32

## 2020-07-20 ASSESSMENT — PAIN SCALES - GENERAL
PAINLEVEL_OUTOF10: 0

## 2020-07-20 NOTE — DISCHARGE SUMMARY
Hospitalist Discharge Summary    Patient ID:  Davide Pratt  [id number]  89 y.o.  9/18/1932    Admit date: 7/12/2020    Discharge date: 7/21/2020    Disposition: SNF    Admission Diagnoses:   Patient Active Problem List   Diagnosis    Hypertension, benign    Osteopenia    Spinal stenosis    Mixed hyperlipidemia    Radial nerve injury    Hip fracture, left (HCC)    Arthritis of left knee    Trigger finger, acquired    Paroxysmal atrial fibrillation (HCC)    Frequent PVCs    Alcohol abuse    Hypokalemia    Syncope and collapse    Hyperlipidemia    PAF (paroxysmal atrial fibrillation) ([facility])    Essential hypertension    Macrocytic anemia    Closed fracture of ramus of left pubis ([facility])    Contusion of left hip    SVT (supraventricular tachycardia) ([facility])    Accidental fall    Closed head injury with concussion    Dementia ([facility])    Hyponatremia    Scalp laceration    Tachycardia    Closed nondisplaced fracture of proximal phalanx of left ring finger    Compression fracture of T12 vertebra ([facility])    Hyponatremia    Closed fracture of neck of right femur ([facility])    Closed fracture of right hip ([facility])    Acute respiratory failure with hypoxia ([facility])    COVID-19    Multifocal pneumonia    Pleural effusion, bilateral    Abnormal chest CT       Discharge Diagnoses: Active Problems:    Acute respiratory failure with hypoxia ([facility])    Multifocal pneumonia    Pleural effusion, bilateral    Abnormal chest CT  Resolved Problems:    * No resolved hospital problems. *      Code Status:  DNR-CCA    Condition:  Stable    Discharge Diet: Diet:  DIET GENERAL; Low Sodium (2 GM); Daily Fluid Restriction: 2000 ml  Dietary Nutrition Supplements: Standard High Calorie Oral Supplement    PCP to do list:  Routine follow up    Hospital Course:     Sepsis due to pneumonia, acute hypoxic respiratory failure  Recent admission for hip fracture. Admitted with fever, tachycardia, shortness of breath.   Required SOLN nebulizer solution Inhale 3 mLs into the lungs every 4 hours as needed for Shortness of Breath  Qty: 360 mL, Refills: 0      furosemide (LASIX) 20 MG tablet Take 1 tablet by mouth daily  Qty: 60 tablet, Refills: 0      polyethylene glycol (GLYCOLAX) 17 g packet Take 17 g by mouth daily as needed for Constipation  Qty: 527 g, Refills: 1      hypromellose (ISOPTO TEARS) 0.5 % ophthalmic solution Place 1 drop into both eyes 2 times daily       amitriptyline (ELAVIL) 25 MG tablet Take 12.5 mg by mouth nightly      gabapentin (NEURONTIN) 100 MG capsule Take 100 mg by mouth 2 times daily. melatonin 5 MG TABS tablet Take 5 mg by mouth nightly      vitamin B-12 (CYANOCOBALAMIN) 1000 MCG tablet Take 1,000 mcg by mouth daily      divalproex (DEPAKOTE SPRINKLE) 125 MG capsule Take 250 mg by mouth nightly      acetaminophen (TYLENOL) 325 MG tablet Take 650 mg by mouth every 6 hours as needed for Pain       amiodarone (CORDARONE) 200 MG tablet Take 1 tablet by mouth daily  Qty: 30 tablet, Refills: 3      metoprolol succinate (TOPROL XL) 50 MG extended release tablet Take 1 tablet by mouth daily  Qty: 30 tablet, Refills: 3      tamsulosin (FLOMAX) 0.4 MG capsule TAKE ONE CAPSULE BY MOUTH DAILY  Qty: 30 capsule, Refills: 5      calcium-vitamin D (OSCAL-500) 500-200 MG-UNIT per tablet Take 1 tablet by mouth daily. Nutritional Supplements (ENSURE PO) Take 1 Can by mouth daily           Current Discharge Medication List      STOP taking these medications       HYDROcodone-acetaminophen (NORCO) 5-325 MG per tablet Comments:   Reason for Stopping:               Procedures: None     Assessment on Discharge: Stable, improved     Discharge Exam:  /72   Pulse 82   Temp 98.2 °F (36.8 °C) (Oral)   Resp 16   Ht 5' 8\" (1.727 m)   Wt 145 lb 4.5 oz (65.9 kg)   SpO2 96%   BMI 22.09 kg/m²     General appearance: No apparent distress, appears stated age and cooperative.   HEENT: Pupils equal, round, and reactive to light. Conjunctivae/corneas clear. Mouth is dry, possible thrush on hard palate  Neck: Supple, with full range of motion. Trachea midline. Respiratory:  Normal respiratory effort. Clear to auscultation, bilaterally without Rales/Wheezes/Rhonchi. Cardiovascular: Regular rate and rhythm with normal S1/S2 without murmurs, rubs or gallops. Abdomen: Soft, non-tender, non-distended with normal bowel sounds. Musculoskelatal: No clubbing, cyanosis or edema bilaterally. Full range of motion without deformity. Skin: Skin color, texture, turgor normal.  No rashes or lesions. Neurologic:  Neurovascularly intact without any focal sensory/motor deficits. Cranial nerves: II-XII intact, grossly non-focal.  Psychiatric: Alert, follows commands but barely speaking    Pertinent Studies During Hospital Stay:    Radiology:  Xr Lumbar Spine (2-3 Views)    Result Date: 7/2/2020  EXAMINATION: THREE XRAY VIEWS OF THE LUMBAR SPINE 7/2/2020 9:23 am COMPARISON: MRI lumbar spine 07/17/2018 HISTORY: ORDERING SYSTEM PROVIDED HISTORY: fall, low back pain TECHNOLOGIST PROVIDED HISTORY: At bedside Reason for exam:->fall, low back pain Reason for Exam: patient was in traction; had to obtain xray portable FINDINGS: Limited exam with poor demonstration of bony detail due to portable technique. Stable compression fracture of L3. Stable wedge compression fracture of T12. No definite acute fracture. Mild diffuse degenerative disc disease     Limited portable exam demonstrating no definite acute fracture     Xr Hip Right (2-3 Views)    Result Date: 7/1/2020  EXAMINATION: TWO XRAY VIEWS OF THE RIGHT HIP 7/1/2020 4:15 pm COMPARISON: None.  HISTORY: ORDERING SYSTEM PROVIDED HISTORY: pain s/p fall from bed TECHNOLOGIST PROVIDED HISTORY: Reason for exam:->pain s/p fall from bed Reason for Exam: Fall (right hip pain and head laceration from fall today at [facility]. pt caught feet in blanket and fell getting ouit of bed) Acuity: Acute Type of Exam: Initial FINDINGS: Subcapital right femoral neck fracture. This could represent a pathologic fracture as focal lucencies are seen within the right femoral neck. No dislocation. Previous internal fixation left femoral neck. Subcapital right femoral neck fracture, may be pathologic as there are possible destructive bony lesions within the right femoral neck     Xr Abdomen (kub) (single Ap View)    Result Date: 7/9/2020  EXAMINATION: ONE SUPINE XRAY VIEW(S) OF THE ABDOMEN 7/9/2020 10:51 am COMPARISON: Radiograph July 2, 2020 HISTORY: ORDERING SYSTEM PROVIDED HISTORY: constipation TECHNOLOGIST PROVIDED HISTORY: Reason for exam:->constipation Reason for Exam: CONSTIPATION Acuity: Acute Type of Exam: Initial FINDINGS: Nonobstructive bowel gas pattern. No significant bowel dilatation. Formed stool noted in the colon. No signs for organomegaly. No acute osseous abnormality identified. Status post right nikolay hip arthroplasty and intertrochanteric fixation of the left hip. No significantly dilated bowel or evidence of obstruction. Ct Head Wo Contrast    Result Date: 7/13/2020  EXAMINATION: CT OF THE HEAD WITHOUT CONTRAST  7/12/2020 10:30 pm TECHNIQUE: CT of the head was performed without the administration of intravenous contrast. Dose modulation, iterative reconstruction, and/or weight based adjustment of the mA/kV was utilized to reduce the radiation dose to as low as reasonably achievable. COMPARISON: 07/01/2020 HISTORY: ORDERING SYSTEM PROVIDED HISTORY: CONFUSION TECHNOLOGIST PROVIDED HISTORY: Reason for exam:->CONFUSION Has a \"code stroke\" or \"stroke alert\" been called? ->No Reason for Exam: ams FINDINGS: BRAIN/VENTRICLES: The cerebral and cerebellar parenchyma demonstrate volume loss. Scattered and confluent low-attenuation areas are noted supratentorially, compatible with severe chronic microvascular white matter ischemic disease. This exam is degraded by motion artifact.   There are no areas of acute hemorrhage, mass, or midline shift. There are no abnormal extra-axial fluid collections. The ventricles are proportional to the cerebral sulci. Vascular calcifications are noted in the carotid siphons. ORBITS: Lens implants from prior cataract surgery are noted. The orbits are otherwise unremarkable. SINUSES: The visualized paranasal sinuses and mastoid air cells demonstrate no acute abnormality. SOFT TISSUES/SKULL:  No acute abnormality of the visualized skull or soft tissues. 1. No acute intracranial abnormality. 2. Cerebral and cerebellar parenchymal volume loss with chronic microvascular white matter ischemic disease. Ct Head Wo Contrast    Result Date: 7/1/2020  EXAMINATION: CT OF THE HEAD WITHOUT CONTRAST  7/1/2020 4:16 pm TECHNIQUE: CT of the head was performed without the administration of intravenous contrast. Dose modulation, iterative reconstruction, and/or weight based adjustment of the mA/kV was utilized to reduce the radiation dose to as low as reasonably achievable. COMPARISON: 07/16/2018 HISTORY: ORDERING SYSTEM PROVIDED HISTORY: fall from bed, head lac TECHNOLOGIST PROVIDED HISTORY: Reason for exam:->fall from bed, head lac Has a \"code stroke\" or \"stroke alert\" been called? ->No Reason for Exam: fall from bed, head lac Acuity: Acute Type of Exam: Initial FINDINGS: BRAIN/VENTRICLES: The ventricles and sulci are diffusely enlarged. Low attenuation is seen in the periventricular and subcortical white matter. No acute intracranial hemorrhage or acute infarct is identified. ORBITS: The visualized portion of the orbits demonstrate no acute abnormality. SINUSES: The visualized paranasal sinuses and mastoid air cells demonstrate no acute abnormality. SOFT TISSUES/SKULL:  Hematoma overlying the right parietal bone. No acute intracranial abnormality.  Diffuse atrophic changes with findings suggesting chronic microvascular ischemia     Ct Chest Wo Contrast    Result Date: 7/2/2020  EXAMINATION: CT OF THE CHEST WITHOUT CONTRAST 7/2/2020 11:43 am TECHNIQUE: CT of the chest was performed without the administration of intravenous contrast. Multiplanar reformatted images are provided for review. Dose modulation, iterative reconstruction, and/or weight based adjustment of the mA/kV was utilized to reduce the radiation dose to as low as reasonably achievable. COMPARISON: None. HISTORY: ORDERING SYSTEM PROVIDED HISTORY: RLL airspace disease. Effusion and/or contusion (he fell at nursing home). TECHNOLOGIST PROVIDED HISTORY: Reason for exam:->RLL airspace disease. Effusion and/or contusion (he fell at nursing home). Reason for Exam: RLL airspace disease. Effusion and/or contusion (he fell at nursing home). Acuity: Acute Type of Exam: Initial FINDINGS: Mediastinum: Thyroid gland appears normal.  Scattered small mediastinal nodes are noted. Coronary artery calcification is seen. Aortic valve calcification is seen. Small hiatal hernia seen. There is nonspecific thickening at the GE junction Lungs/pleura: Moderate right and small left-sided pleural effusion is seen. On the right, hazy ground-glass and parenchymal opacity seen in the right upper lobe, right middle lobe, with more focal consolidation right lower lobe. Scattered septal thickening is seen on the right. On the left hazy ground-glass and parenchymal opacity seen in left upper lobe with more focal consolidation in the left lower lobe Upper Abdomen: Adrenal glands appear normal.  There is body wall anasarca. Soft Tissues/Bones: Spurring is seen in the spine. Spurring is seen in the shoulder joints. Chronic wedging of lower thoracic spine vertebral body is seen. .  Remote appearing rib fractures are seen. Moderate bilateral pleural effusions and bilateral airspace disease with superimposed atelectasis or pneumonia at the lung bases.   Scattered areas of septal thickening are seen, compatible with a component of fluid overload-pulmonary edema     Ct Cervical Spine Wo Contrast    Result Date: 7/1/2020  EXAMINATION: CT OF THE CERVICAL SPINE WITHOUT CONTRAST 7/1/2020 4:16 pm TECHNIQUE: CT of the cervical spine was performed without the administration of intravenous contrast. Multiplanar reformatted images are provided for review. Dose modulation, iterative reconstruction, and/or weight based adjustment of the mA/kV was utilized to reduce the radiation dose to as low as reasonably achievable. COMPARISON: 07/16/2018 HISTORY: ORDERING SYSTEM PROVIDED HISTORY: fall from bed TECHNOLOGIST PROVIDED HISTORY: Reason for exam:->fall from bed Reason for Exam: fall from bed, head lac Acuity: Acute Type of Exam: Initial FINDINGS: BONES/ALIGNMENT: There is no acute fracture or traumatic malalignment. DEGENERATIVE CHANGES: Multilevel degenerative changes SOFT TISSUES: There is no prevertebral soft tissue swelling. No acute abnormality of the cervical spine. Nm Lung Scan Perfusion Only    Result Date: 7/2/2020  EXAMINATION: LUNG PERFUSION IMAGING 7/2/2020 9:53 am TECHNIQUE: 7.9 millicuries technetium 99 M MAA was injected intravenously. Single anterior image was obtained. Ventilation imaging was not performed. COMPARISON: Chest radiograph dated 07/02/2020 HISTORY: ORDERING SYSTEM PROVIDED HISTORY: Possible PE TECHNOLOGIST PROVIDED HISTORY: Reason for exam:->Possible PE FINDINGS: Diminished activity is demonstrated at the lateral right base. Patient is known to have airspace disease and effusion at the right base on recent radiograph. No ventilation series is available for correlation. Intermediate probability for pulmonary embolism. Xr Chest Portable    Result Date: 7/16/2020  EXAMINATION: ONE XRAY VIEW OF THE CHEST 7/16/2020 5:38 am COMPARISON: 07/15/2020.  HISTORY: ORDERING SYSTEM PROVIDED HISTORY: respiratory failure TECHNOLOGIST PROVIDED HISTORY: Reason for exam:->respiratory failure Reason for Exam: pneumonai Acuity: Acute Type of Exam: Initial FINDINGS: The cardiac silhouette is enlarged. Mediastinal contours are normal. Vascular calcifications are noted along the aortic arch. There are worsening bilateral lung infiltrates, most pronounced in the upper lobes and right lung base. This may be related to pulmonary vascular congestion versus pneumonia. Small bilateral pleural effusions are noted. 1. Worsening bilateral lung infiltrates with associated bilateral pleural effusions. Xr Chest Portable    Result Date: 7/15/2020  EXAMINATION: ONE XRAY VIEW OF THE CHEST 7/15/2020 8:54 pm COMPARISON: CT chest, 07/12/2020 HISTORY: ORDERING SYSTEM PROVIDED HISTORY: SOB TECHNOLOGIST PROVIDED HISTORY: Reason for exam:->SOB Reason for Exam: sob FINDINGS: The cardiac silhouette is normal.  Thoracic aortic calcification is noted. Hilar contours are normal. There are hazy, alveolar opacities in the mid and lower lungs, with slight consolidation in the right base. Aeration of the apical right upper lobe is improved in the interval.  Bilateral pleural effusions are present. No pneumothorax is seen. Continued bilateral pleural effusions and bilateral airspace disease, with shifting areas of consolidation. This suggests at least a component of pulmonary edema. Pneumonia and ARDS remain differential possibilities. Xr Chest Portable    Result Date: 7/6/2020  EXAMINATION: ONE XRAY VIEW OF THE CHEST 7/6/2020 9:04 am COMPARISON: 07/03/2020 HISTORY: ORDERING SYSTEM PROVIDED HISTORY: worsening acute resp failure TECHNOLOGIST PROVIDED HISTORY: Reason for exam:->worsening acute resp failure Reason for Exam: worsening acute resp failure Acuity: Acute Type of Exam: Subsequent/Follow-up FINDINGS: Heart size stable. No significant change in bilateral multifocal ground-glass and airspace opacities with predominantly subpulmonic pleural effusions.      Stable appearance of presumed pulmonary edema with bilateral pleural effusions     Xr Chest 1 Vw    Result Date: 7/13/2020  EXAMINATION: CTA OF THE CHEST 7/12/2020 10:04 pm TECHNIQUE: CTA of the chest was performed after the administration of intravenous contrast.  Multiplanar reformatted images are provided for review. MIP images are provided for review. Dose modulation, iterative reconstruction, and/or weight based adjustment of the mA/kV was utilized to reduce the radiation dose to as low as reasonably achievable. COMPARISON: Chest radiograph 07/12/2020; CT chest 07/02/2020 HISTORY: ORDERING SYSTEM PROVIDED HISTORY: SOB, recent hip sx TECHNOLOGIST PROVIDED HISTORY: Reason for exam:->SOB, recent hip sx Reason for Exam: sob recent hip fx FINDINGS: Pulmonary Arteries: Pulmonary arteries are adequately opacified for evaluation. Motion artifact limits evaluation to the segmental level. No evidence of intraluminal filling defect to suggest pulmonary embolism. Main pulmonary artery is obscured by motion, however the branch main pulmonary arteries are enlarged, measuring 3.3 cm on the right and 2.9 cm on the left. Mediastinum: No appreciable lymphadenopathy on this limited exam.  Mild cardiomegaly. No pericardial effusion. No significant coronary calcifications. Unremarkable esophagus and thyroid. Normal caliber thoracic aorta without evidence of aneurysm or dissection. Lungs/pleura: No pneumothorax. Moderate pleural effusions with associated atelectasis. Additional multifocal ground-glass and consolidative airspace disease with areas of air bronchograms within the upper and lower lobes. Some dependent secretions suspected along the upper trachea. Upper Abdomen: Included upper abdominal contents are unremarkable for any acute findings. Cholelithiasis versus vicarious excretion of contrast in the gallbladder. Soft Tissues/Bones: No acute soft tissue or osseous abnormality. Thoracic spondylosis. Negative for acute pulmonary embolism. Respiratory motion artifact limits evaluation to the segmental level.  Features of 9.0 5.0 - 10.5 fL    Neutrophils % 80.5 %    Lymphocytes % 9.6 %    Monocytes % 7.5 %    Eosinophils % 1.9 %    Basophils % 0.5 %    Neutrophils Absolute 12.5 (H) 1.7 - 7.7 K/uL    Lymphocytes Absolute 1.5 1.0 - 5.1 K/uL    Monocytes Absolute 1.2 0.0 - 1.3 K/uL    Eosinophils Absolute 0.3 0.0 - 0.6 K/uL    Basophils Absolute 0.1 0.0 - 0.2 K/uL         Follow up: with Irena Siddiqi MD    Note that more  than 30 minutes was spent in preparing discharge papers, discussing discharge with patient, medication review, etc.    Thank you Irena Siddiqi MD for the opportunity to be involved in this patient's care. If you have any questions or concerns please feel free to contact me at [phone]. Electronically signed by Ricardo Ivy MD on 7/21/2020 at 2:08 PM    This note was transcribed using [id number] [employer]. Please disregard any translational errors.

## 2020-07-20 NOTE — PLAN OF CARE
Problem: Falls - Risk of:  Goal: Will remain free from falls  Description: Will remain free from falls  7/20/2020 1040 by Henok Sands RN  Outcome: Ongoing  Note: Fall precautions in place. Bed locked and in lowest position. Call button within reach. Bed alarm on   7/20/2020 0103 by Carolyn Floyd RN  Outcome: Ongoing  Goal: Absence of physical injury  Description: Absence of physical injury  7/20/2020 1040 by Henok Sands RN  Outcome: Ongoing  7/20/2020 0103 by Carolyn Floyd RN  Outcome: Ongoing     Problem: Skin Integrity:  Goal: Will show no infection signs and symptoms  Description: Will show no infection signs and symptoms  7/20/2020 1040 by Henok Sands RN  Outcome: Ongoing  7/20/2020 0103 by Carolyn Floyd RN  Outcome: Ongoing  Goal: Absence of new skin breakdown  Description: Absence of new skin breakdown  7/20/2020 1040 by Henok Sands RN  Outcome: Ongoing  Note: Pt repositioned q 2h as allows.    7/20/2020 0103 by Carolyn Floyd RN  Outcome: Ongoing     Problem: Nutrition  Goal: Optimal nutrition therapy  7/20/2020 1040 by Henok Sands RN  Outcome: Ongoing  7/20/2020 0103 by Carolyn Floyd RN  Outcome: Ongoing

## 2020-07-20 NOTE — PROGRESS NOTES
Pt remained very flat and withdrawn this shift. Refused all food and fluid. Pt responds in short 1 word answers. Pt did allow therapy to get pt up in recliner. Writer spoke with pt's daughter- updated her on pt's refusal to eat. Also informed daughter that pt does not have hearing aids here.

## 2020-07-20 NOTE — CARE COORDINATION
DISCHARGE SUMMARY     DATE OF DISCHARGE: 07/21/2020    DISCHARGE DESTINATION:     [address] at [facility] [place], [address] [facility] [place] [address]  Report Phone: [phone]  Fax: [phone]    HENS not required    TRANSPORTATION:   Company Name: [facility]   Time: 12:00pm  Phone Number: [phone]    Transportation paperwork is already in the patient's chart. COMMENTS: SW left message for son Isabela Frazier regarding the discharge plan for tomorrow and spoke with patient's daughter Za Carey as well. Respectfully submitted,    RADHA Damico Mai  [facility]   [phone]    Electronically signed by RADHA Rene on 7/20/2020 at 3:05 PM

## 2020-07-20 NOTE — PROGRESS NOTES
Hospitalist Progress Note      PCP: Akash Troy MD    Date of Admission: 7/12/2020    Chief Complaint:   Chief Complaint   Patient presents with    Shortness of Breath     Pt came in via EMS with worsening shortness of breath and hypoxia throughtout the day. EMS reports pt \"had oxygen saturation in 70's on arrival\". Hospital Course: 80 y. o. male With medical history including atrial fibrillation, hypertension, hyperlipidemia presents with complaints of shortness of breath.  Patient recently underwent surgical repair of right hip fracture on 7/6/2020.  In the emergency department patient was found to be hypoxic requiring support with BiPAP to maintain adequate oxygenation.  Laboratory work-up notable for marked leukocytosis.  Imaging demonstrated multifocal opacities although these are seen on previous imaging. Febrile to 103. COVID negative. Subjective: Barely speaking though nods and shakes head to questions and is following commands. Per nursing he is barely eating today or yesterday. Spoke with his daughter, she says this is happened before and she thinks that he will probably improve at the nursing facility when he is able to visit with his wife again.       Medications:  Reviewed    Infusion Medications   Scheduled Medications    vancomycin  500 mg Intravenous Once    amiodarone  200 mg Oral Daily    amitriptyline  12.5 mg Oral Nightly    aspirin EC  81 mg Oral BID    divalproex  250 mg Oral Nightly    gabapentin  100 mg Oral BID    metoprolol succinate  50 mg Oral Daily    tamsulosin  0.4 mg Oral Daily    sodium chloride flush  10 mL Intravenous 2 times per day    enoxaparin  40 mg Subcutaneous Daily    cefepime  2 g Intravenous Q24H    vancomycin (VANCOCIN) intermittent dosing (placeholder)   Other RX Placeholder     PRN Meds: melatonin, HYDROcodone-acetaminophen, sodium chloride flush, acetaminophen **OR** acetaminophen, polyethylene glycol, promethazine **OR** ondansetron      Intake/Output Summary (Last 24 hours) at 7/20/2020 0933  Last data filed at 7/20/2020 0918  Gross per 24 hour   Intake 90 ml   Output 400 ml   Net -310 ml       Physical Exam Performed:    /66   Pulse 65   Temp 99.2 °F (37.3 °C) (Oral)   Resp 14   Ht 5' 8\" (1.727 m)   Wt 149 lb 4 oz (67.7 kg)   SpO2 92%   BMI 22.69 kg/m²     General appearance: No apparent distress, appears stated age and cooperative. HEENT: Pupils equal, round, and reactive to light. Conjunctivae/corneas clear. Neck: Supple, with full range of motion. Trachea midline. Respiratory:  Normal respiratory effort. Clear to auscultation, bilaterally without Rales/Wheezes/Rhonchi. Cardiovascular: Regular rate and rhythm with normal S1/S2 without murmurs, rubs or gallops. Abdomen: Soft, non-tender, non-distended with normal bowel sounds. Musculoskeletal: No clubbing, cyanosis or edema bilaterally. Full range of motion without deformity. Skin: Skin color, texture, turgor normal.  No rashes or lesions. Neurologic:  Neurovascularly intact without any focal sensory/motor deficits. Cranial nerves: II-XII intact, grossly non-focal.  Psychiatric: Not speaking but following commands  Capillary Refill: Brisk,< 3 seconds   Peripheral Pulses: +2 palpable, equal bilaterally       Labs:   Recent Labs     07/18/20  0533 07/19/20  0711 07/20/20  0627   WBC 24.6* 25.1* 21.4*   HGB 9.8* 9.8* 9.9*   HCT 30.4* 31.1* 31.7*    375 354     Recent Labs     07/18/20  0533 07/19/20  0711 07/20/20  0627    141 145   K 4.4 4.1 4.4    106 111*   CO2 25 26 25   BUN 44* 46* 55*   CREATININE 1.4* 1.3 1.6*   CALCIUM 8.6 8.2* 8.0*   PHOS 3.3 3.6 3.9     No results for input(s): AST, ALT, BILIDIR, BILITOT, ALKPHOS in the last 72 hours. No results for input(s): INR in the last 72 hours. No results for input(s): Scarlet Hodgkins in the last 72 hours.     Urinalysis:      Lab Results   Component Value Date    NITRU Negative 07/01/2020    WBCUA 1 07/01/2020    RBCUA 1 07/01/2020    BLOODU Negative 07/01/2020    SPECGRAV 1.013 07/01/2020    GLUCOSEU Negative 07/01/2020       Radiology:  XR CHEST PORTABLE   Final Result   1. Worsening bilateral lung infiltrates with associated bilateral pleural   effusions. XR CHEST PORTABLE   Final Result   Continued bilateral pleural effusions and bilateral airspace disease, with   shifting areas of consolidation. This suggests at least a component of   pulmonary edema. Pneumonia and ARDS remain differential possibilities. CT CHEST PULMONARY EMBOLISM W CONTRAST   Final Result   Negative for acute pulmonary embolism. Respiratory motion artifact limits   evaluation to the segmental level. Features of heart failure with moderate volume pleural effusions. Progressive multifocal airspace disease may in part represent asymmetric   pulmonary edema, however cannot exclude superimposed infection (including   atypical or viral pathogens) or ARDS. Enlarged branch main pulmonary arteries may be seen with pulmonary   hypertension. CT HEAD WO CONTRAST   Final Result   1. No acute intracranial abnormality. 2. Cerebral and cerebellar parenchymal volume loss with chronic microvascular   white matter ischemic disease. XR CHEST 1 VW   Final Result   No interval change in bilateral multifocal airspace opacities. Small right   pleural effusion.                Assessment/Plan:    Active Hospital Problems    Diagnosis    Abnormal chest CT [R93.89]    Multifocal pneumonia [J18.9]    Pleural effusion, bilateral [J90]    Acute respiratory failure with hypoxia (HCC) [J96.01]     Sepsis  Temperature, heart rate, respirations, source lungs  Lactic acid normal  Hold IV fluids  Vancomycin and cefepime completed  Reorder COVID-19: neg  resolved     Acute respiratory failure with hypoxia  Titrate oxygen to keep saturations above 90%  Down to 2 L  Pulmonology following     HCAP  Procalcitonin elevated but trending down  Cefepime and Vanc due to recent hospitalization  Recheck COVID-19 PCR also negative  Strep and Legionella negative, MRSA nasal probe positive  resp cult ordered, fungal cultures     Atrial fibrillation  Continue home amiodarone     Chronic Kidney Disease Stage 3  Stable  No need for further intervention  Monitor    Hypoactive delirium? Spoke with family, think he will improve when out of hospital setting. Will have SLP see him to make sure there are no functional problems with eating. Nursing to work on hearing aids as well which may help    DVT Prophylaxis: Lovenox  Diet: DIET GENERAL; Low Sodium (2 GM); Daily Fluid Restriction: 2000 ml  Dietary Nutrition Supplements: Standard High Calorie Oral Supplement  Code Status: DNR-CCA    PT/OT Eval Status: ordered    Dispo - SNF, today vs tomorrow, pending SLP eval    Iván Mccrary MD    This note was transcribed using [address] [employer]. Please disregard any translational errors.

## 2020-07-20 NOTE — DISCHARGE INSTR - COC
Continuity of Care Form    Patient Name: Alpesh Bonilla   :  1932  MRN:  [id number]    Admit date:  2020  Discharge date:  20    Code Status Order: DNR-CCA   Advance Directives:   [facility] Documentation     Date/Time Healthcare Directive Type of Healthcare Directive Copy in [address] Agent's Name Healthcare Agent's Phone Number    20 0229  Yes, patient has an advance directive for healthcare treatment  Durable power of  for health care  Yes, copy in chart  Healthcare power of   --  --          Admitting Physician:  Bentley Winters MD  PCP: Jese Matt MD    Discharging Nurse:  [facility] Unit/Room#: O6K-0988/5265-01  Discharging Unit Phone Number: [phone]    Emergency Contact:   Extended Emergency Contact Information  Primary Emergency Contact: [facility] [address] Phone: [phone]  Relation: Brother/Sister  Secondary Emergency Contact: Oriana Díaz  Address: [facility], [address] [place] [address] Phone: [phone]  Relation: Spouse    Past Surgical History:  Past Surgical History:   Procedure Laterality Date    HIP FRACTURE SURGERY Left     HIP SURGERY Right 2020    RIGHT HIP HEMIARTHROPLASTY performed by Monisha Hedrick MD at [place] [address]       Immunization History:   Immunization History   Administered Date(s) Administered    Influenza Virus Vaccine 2014, 10/23/2015    Influenza, High Dose (Fluzone 65 yrs and older) 2013    Zoster Live (Zostavax) 2011       Active Problems:  Patient Active Problem List   Diagnosis Code    Hypertension, benign I10    Osteopenia M85.80    Spinal stenosis M48.00    Mixed hyperlipidemia E78.2    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 02XA    SVT (supraventricular tachycardia) ([facility]) I47.1    Accidental fall W19. XXXA    Closed head injury with concussion S06. 0X9A    Dementia ([address] F03.90    Hyponatremia E87.1    Scalp laceration S01. 01XA    Tachycardia R00.0    Closed nondisplaced fracture of proximal phalanx of left ring finger S62.645A    Compression fracture of T12 vertebra ([facility]) S22.080A    Hyponatremia E87.1    Closed fracture of neck of right femur ([facility]) S72.001A    Closed fracture of right hip ([facility]) S72.001A    Acute respiratory failure with hypoxia ([facility]) J96.01    COVID-19 U07.1    Multifocal pneumonia J18.9    Pleural effusion, bilateral J90    Abnormal chest CT R93.89       Isolation/Infection:   Isolation          No Isolation        Patient Infection Status     Infection Onset Added Last Indicated Last Indicated By Review Planned Expiration Resolved Resolved By    None active    Resolved    MRSA 07/13/20 07/14/20 07/13/20 Culture, MRSA, Screening   07/15/20 Kamron Snyder RN    COVID-19 Rule Out 07/13/20 07/13/20 07/13/20 COVID-19 (Ordered)   07/14/20 Rule-Out Test Resulted    COVID-19 Rule Out 07/12/20 07/12/20 07/12/20 COVID-19 (Ordered)   07/12/20 Rule-Out Test Resulted    COVID-19 Rule Out 07/01/20 07/01/20 07/01/20 COVID-19 (Ordered)   07/01/20 Rule-Out Test Resulted          Nurse Assessment:  Last Vital Signs: /69   Pulse 66   Temp 98.4 °F (36.9 °C) (Oral)   Resp 18   Ht 5' 8\" (1.727 m)   Wt 149 lb 4 oz (67.7 kg)   SpO2 100%   BMI 22.69 kg/m²     Last documented pain score (0-10 scale): Pain Level: 0  Last Weight:   Wt Readings from Last 1 Encounters:   07/20/20 149 lb 4 oz (67.7 kg)     Mental Status: alert    IV Access:  - None    Nursing Mobility/ADLs:  Walking   Dependent  Transfer  Dependent  Bathing  Dependent  Dressing  Dependent  Toileting  Dependent  Feeding  Dependent  Med Admin  Dependent  Med Delivery   crushed and prefers mixed with ice cream    Wound Care Documentation and Therapy:        Elimination:  Continence:   · Bowel: Yes  · Bladder: No  Urinary Catheter: None   Colostomy/Ileostomy/Ileal Conduit: No       Date of Last BM: ***    Intake/Output Summary (Last 24 hours) at 7/20/2020 1457  Last data filed at 7/20/2020 0918  Gross per 24 hour   Intake 10 ml   Output 250 ml   Net -240 ml     I/O last 3 completed shifts: In: 80 [P.O.:30; I.V.:10; IV Piggyback:50]  Out: 350 [Urine:350]    Safety Concerns:     History of Falls (last 30 days)    Impairments/Disabilities:      Hearing    Nutrition Therapy:  Current Nutrition Therapy:   - Oral Diet:  Low Sodium (2gm)    Routes of Feeding: Oral  Liquids: Thin Liquids  Daily Fluid Restriction: yes - amount 2000ml  Last Modified Barium Swallow with Video (Video Swallowing Test): not done    Treatments at the Time of Hospital Discharge:   Respiratory Treatments: ***  Oxygen Therapy:  is on oxygen at 1 L/min per nasal cannula.   Ventilator:    - No ventilator support    Rehab Therapies: Physical Therapy, Occupational Therapy and Speech/Language Therapy  Weight Bearing Status/Restrictions: No weight bearing restirctions  Other Medical Equipment (for information only, NOT a DME order):  wheelchair and hospital bed  Other Treatments: ***    Patient's personal belongings (please select all that are sent with patient):  Hearing Aides bilateral, *DID NOT HAVE WITH HIM AT ADMISSION    RN SIGNATURE:  Electronically signed by Catalino Garcia RN on 7/21/20 at 7:10 AM EDT    CASE MANAGEMENT/SOCIAL WORK SECTION    Inpatient Status Date: 07/12/2020    Readmission Risk Assessment Score:  Readmission Risk              Risk of Unplanned Readmission:        26 Discharging to Facility/ Agency   · Name: [facility]  · Address: [facility], [address] [place] [address]  · Phone: [phone]  · Fax: [phone]    / signature: Electronically signed by RADHA Rene on 7/20/2020 at 2:58 PM    PHYSICIAN SECTION    Prognosis: {Prognosis:6674269099}    Condition at Discharge: [address] [place] [address] Patient Condition:[id number]}    Rehab Potential (if transferring to Rehab): {Prognosis:8576100442}    Recommended Labs or Other Treatments After Discharge: ***    Physician Certification: I certify the above information and transfer of Yeny Lopez  is necessary for the continuing treatment of the diagnosis listed and that he requires {Admit to Appropriate Level of Care:43300} for {GREATER/LESS:197976929} 30 days.      Update Admission H&P: {CHP DME Changes in EKWQY:366301724}    PHYSICIAN SIGNATURE:  {Esignature:278320399}

## 2020-07-20 NOTE — PROGRESS NOTES
Speech Language Pathology  Facility/Department: [address] PROGRESSIVE CARE   CLINICAL BEDSIDE SWALLOW EVALUATION    NAME: Carol Moralez  : 1932  MRN: [id number]    ADMISSION DATE: 2020  ADMITTING DIAGNOSIS: has Hypertension, benign; Osteopenia; Spinal stenosis; Mixed hyperlipidemia; Radial nerve injury; Hip fracture, left ([address] Arthritis of left knee; Trigger finger, acquired; Paroxysmal atrial fibrillation ([address] Frequent PVCs; Alcohol abuse; Hypokalemia; Syncope and collapse; Hyperlipidemia; PAF (paroxysmal atrial fibrillation) ([address] Essential hypertension; Macrocytic anemia; Closed fracture of ramus of left pubis ([address] Contusion of left hip; SVT (supraventricular tachycardia) ([address] Accidental fall; Closed head injury with concussion; Dementia ([address] Hyponatremia; Scalp laceration; Tachycardia; Closed nondisplaced fracture of proximal phalanx of left ring finger; Compression fracture of T12 vertebra ([address] Hyponatremia; Closed fracture of neck of right femur ([address] Closed fracture of right hip ([address] Acute respiratory failure with hypoxia ([address] COVID-19; Multifocal pneumonia; Pleural effusion, bilateral; and Abnormal chest CT on their problem list.  ONSET DATE: 2020    Recent Chest Xray 2020  Impression    1. Worsening bilateral lung infiltrates with associated bilateral pleural    effusions.           Date of Eval: 2020  Evaluating Therapist: Yair Angel    Current Diet level:  Current Diet : Regular  Current Liquid Diet : Thin    Primary Complaint  Patient Complaint: pt doesn't generate a cpmplaint; MD notes indicate poor PO intake and concern for functional underlying component    Pain:  Pain Assessment  Pain Assessment: 0-10  Pain Level: 0    Reason for Referral  Carol Moralez was referred for a bedside swallow evaluation to assess the efficiency of his swallow function, identify signs and symptoms of aspiration and make recommendations regarding safe dietary consistencies, effective compensatory strategies, and safe eating environment. Impression  Dysphagia Diagnosis: Mild oral stage dysphagia;Mild pharyngeal stage dysphagia  Dysphagia Impression :   Mild oropharyngeal dysphagia characterized by decreased lingual coordination for bolus transit; prolonged but effective mastication; delayed swallow initiation; decreased laryngeal elevation;  ·  no overt s/s of aspiration with regular, soft, puree, and thin liquids via straw. · Cough post swallow x1 with large boluses of nectar liquid via cup. · Pt indicates distaste/displeasure with all tastes/textures during and after swallow via shaking head and/or grimacing; intermittent oral residue with all consistencies appears r/t distaste, and is cleared with reflexive dry swallow. · Recommend regular textures as tolerated to promote maximum PO intake, and thin liquids with fully upright position, small sips, close monitor. · Continue speech therapy for dysphagia upon discharge from hospital.  While MBS may be beneficial to fully assess pharyngeal phase impairment, pt's reluctance for PO and decreased cognitive status are limiting factors in his ability to fully participate at this time. Dysphagia Outcome Severity Scale: Level 5: Mild dysphagia- Distant supervision. May need one diet consistency restricted     Treatment Plan  Requires SLP Intervention: Yes  Duration/Frequency of Treatment: 3-5x/wk while on acute unit  D/C Recommendations: SNF     Recommended Diet and Intervention  Diet Solids Recommendation: Regular  Liquid Consistency Recommendation: Thin  Recommended Form of Meds: PO  Recommendations: Dysphagia treatment  Therapeutic Interventions: Diet tolerance monitoring;Patient/Family education    Compensatory Swallowing Strategies  Compensatory Swallowing Strategies: Upright as possible for all oral intake;Remain upright for 30-45 minutes after meals;Small bites/sips    Treatment/Goals  Dysphagia Goals:  The patient will tolerate recommended diet without observed clinical signs of aspiration; The patient/caregiver will demonstrate understanding of compensatory strategies for improved swallowing safety. General  Chart Reviewed: Yes  Subjective  Subjective: Asleep in b/s chair, arouses to verbal/tactile stim, reluctant for PO but agreeable with cues/encouragement  Behavior/Cognition: Alert; Cooperative; Requires cueing;Confused  Respiratory Status: O2 via nasual cannula  Communication Observation: Functional(limited to simple responses)  Follows Directions: Simple  Dentition: Some missing teeth  Patient Positioning: Upright in chair  Baseline Vocal Quality: Normal  Volitional Cough: Strong  Volitional Swallow: Delayed  Prior Dysphagia History: regular/thin at [facility] per chart review  Consistencies Administered: Reg solid; Dysphagia Soft and Bite-Sized (Dysphagia III); Dysphagia Pureed (Dysphagia I); Nectar - cup;Nectar - straw; Thin - cup; Thin - straw     Vision/Hearing  Vision  Vision: Within Functional Limits  Hearing  Hearing: Exceptions to [facility]  Hearing Exceptions: Hard of hearing/hearing concerns    Oral Motor Deficits  Oral/Motor  Oral Motor: Exceptions to [facility]  Labial ROM: Reduced right;Reduced left  Labial Strength: Reduced  Lingual ROM: Reduced left; Reduced right  Lingual Strength: Reduced  Lingual Coordination: Reduced  Gag: Unable to assess    Oral Phase Dysfunction  Oral Phase  Oral Phase: Exceptions  Oral Phase Dysfunction  Decreased Anterior to Posterior Transit: All  Lingual/Palatal Residue: All(clears with IND repeat swallow)     Indicators of Pharyngeal Phase Dysfunction   Pharyngeal Phase  Pharyngeal Phase: Exceptions  Indicators of Pharyngeal Phase Dysfunction  Delayed Swallow:  All  Decreased Laryngeal Elevation: All  Cough - Immediate: Nectar - cup    Prognosis  Prognosis  Prognosis for safe diet advancement: fair  Individuals consulted  Consulted and agree with results and recommendations: Patient;RN    Education  Patient Education: results and recs  Patient Education Response: Needs reinforcement; No evidence of learning          Therapy Time  SLP Individual Minutes  Time In: 1500  Time Out: 9792  Minutes: 45     This note serves as a D/C Summary in the event that this patient is discharged prior to the next therapy session.     Ángel Fountain MS, CCC-SLP #[id number]  Speech Language Pathologist  7/20/2020 3:46 PM

## 2020-07-20 NOTE — PLAN OF CARE
Pt is a high fall risk. His bed is in the locked, low position with side rails up X 3 and an active bed alarm. Pt doesn't make attempts to get OOB. Nonskid socks in place. Pt has a laceration on his OD and a healing incision on his R hip. He has some redness to his groin. Dimethicone wipes used during clean up and zinc oxide paste applied afterwards. Pt is a check/change Q2H. Positioning wedge used to reposition pt. Pt has a poor PO intake. Pt asked if he wanted to take his meds with applesauce, pudding, or ice cream. Pt chose applesauce. RN made pt aware that he would have to take at least 2 bites of apple sauce to go with meds. Pt nodded his head yes. Pt took one bite of applesauce/meds and grimaced. He initially refused to take the second bite with the rest of the meds. RN reiterated agreement made by pt to take his meds. Pt took last bite of applesauce. He refused to drink anything to wash it down. PCA offered DN tray a couple of times at the beginning of the shift but pt refuses to try anything on his tray. Will continue to monitor.

## 2020-07-20 NOTE — PROGRESS NOTES
Physical Therapy  Facility/Department: [address] PROGRESSIVE CARE  Daily Treatment Note  NAME: Aundrea Bai  : 1932  MRN: [id number]    Date of Service: 2020    Discharge Recommendations:  3-5 sessions per week, Patient would benefit from continued therapy after discharge   PT Equipment Recommendations  Equipment Needed: No    Assessment   Body structures, Functions, Activity limitations: Decreased functional mobility ; Decreased strength;Decreased endurance  Assessment: Pt very quiet, but agreeable to activity with encouragement. He required max A to move to EOB, then stood to stedy with Min A, doing well to maintain balance, but being limited d/t fatigue. He would benefit from continued therapy to improve his balance, strength, and independence transferring/ambulating. Continue to recommend low-moderate frequency therapy upon D/C. Aundrea Bai scored a  on the AM-PAC short mobility form. Current research shows that an AM-PAC score of 17 or less is typically not associated with a discharge to the patient's home setting. Based on the patient's AM-PAC score and their current functional mobility deficits, it is recommended that the patient have 3-5 sessions per week of Physical Therapy at d/c to increase the patient's independence. Please see assessment section for further patient specific details. If patient discharges prior to next session this note will serve as a discharge summary. Please see below for the latest assessment towards goals. PT Education: Goals;PT Role;Plan of Care;General Safety;Precautions;Transfer Training;Functional Mobility Training  REQUIRES PT FOLLOW UP: Yes  Activity Tolerance  Activity Tolerance: Patient limited by cognitive status     Patient Diagnosis(es): The encounter diagnosis was Hypoxia.      has a past medical history of Alcohol abuse, Arthritis of left knee, Atrial fibrillation ([address] HTN (hypertension), Hyperlipidemia, MRSA colonization, and PAF (paroxysmal atrial fibrillation) ([facility] [address] has a past surgical history that includes Hip fracture surgery (Left) and hip surgery (Right, 7/6/2020). Restrictions  Restrictions/Precautions  Restrictions/Precautions: Fall Risk  Position Activity Restriction  Other position/activity restrictions: posterior hip precautions due to recent R hip surgery     Subjective   General  Chart Reviewed: Yes  Additional Pertinent Hx: Garrison Stacy MD \"89 y.o. male With medical history including atrial fibrillation, hypertension, hyperlipidemia presents with complaints of shortness of breath. Patient recently underwent surgical repair of right hip fracture on 7/6/2020. In the emergency department patient was found to be hypoxic requiring support with BiPAP to maintain adequate oxygenation. Laboratory work-up notable for marked leukocytosis. Imaging demonstrated multifocal opacities although these are seen on previous imaging. \"  Response To Previous Treatment: Not applicable  Family / Caregiver Present: No  Referring Practitioner: Huyen Jimenez MD  Subjective  Subjective: Pt supine in bed upon arrival.  Very [place]. Denies pain and agreeable to PT session with encouragement. Orientation  Orientation  Overall Orientation Status: Within Functional Limits    Objective      Bed mobility  Supine to Sit: Maximum assistance     Transfers  Sit to Stand: Minimal Assistance;Dependent/Total(min A from elevated EOB to stedy; Min A from chair to stedy)  Stand to sit: Minimal Assistance;Dependent/Total(From stedy)  Bed to Chair: Dependent/Total(using stedy)     Balance  Comments: Pt able to maintain standing balance at stedy x 2 trials with CGA-SBA x 1 min., then x 30 s. Pt required seated rest between trials d/t fatigue. Exercises  Comments: Seated:  Cervical flex/ext AROM x 5, R/L rotation x 5, (B) UE elbow flex/Ext, (B) LE hip flex AROM x 10, Knee Flex/Ext AROM x 10.         AM-PAC Score  AM-PAC Inpatient Mobility Raw Score : 9 (07/20/20 1059)  AM-PAC Inpatient T-Scale Score : 30.55 (07/20/20 1059)  Mobility Inpatient CMS 0-100% Score: 81.38 (07/20/20 1059)  Mobility Inpatient CMS G-Code Modifier : CM (07/20/20 1059)          Goals  Short term goals  Time Frame for Short term goals: upon d/c  Short term goal 1: bed mobility modA x1  Short term goal 2: transfers CGA  Short term goal 3: ambulate 8' with RW and minAx2  Long term goals  Time Frame for Long term goals : LTG=STG  Patient Goals   Patient goals : unable to state goal    Plan    Plan  Times per week: 3-5x/wk  Current Treatment Recommendations: Strengthening, ROM, Balance Training, Functional Mobility Training, Transfer Training, Home Exercise Program, Safety Education & Training, Patient/Caregiver Education & Training, Equipment Evaluation, Education, & procurement, Gait Training  Safety Devices  Type of devices:  All fall risk precautions in place, Gait belt, Call light within reach, Left in chair, Nurse notified, Chair alarm in place  Restraints  Initially in place: No     Therapy Time   Individual Concurrent Group Co-treatment   Time In 1025         Time Out 1050         Minutes 25         Timed Code Treatment Minutes: Darrell Kelley PT    Electronically signed by Bonifacio Dveine PT [id number] on 7/20/2020 at 11:08 AM

## 2020-07-21 VITALS
HEART RATE: 82 BPM | OXYGEN SATURATION: 96 % | TEMPERATURE: 98.2 F | BODY MASS INDEX: 22.02 KG/M2 | DIASTOLIC BLOOD PRESSURE: 72 MMHG | WEIGHT: 145.28 LBS | HEIGHT: 68 IN | SYSTOLIC BLOOD PRESSURE: 126 MMHG | RESPIRATION RATE: 16 BRPM

## 2020-07-21 LAB
ALBUMIN SERPL-MCNC: 2.5 G/DL (ref 3.4–5)
ANION GAP SERPL CALCULATED.3IONS-SCNC: 11 MMOL/L (ref 3–16)
BASOPHILS ABSOLUTE: 0.1 K/UL (ref 0–0.2)
BASOPHILS RELATIVE PERCENT: 0.5 %
BUN BLDV-MCNC: 55 MG/DL (ref 7–20)
CALCIUM SERPL-MCNC: 8.2 MG/DL (ref 8.3–10.6)
CHLORIDE BLD-SCNC: 112 MMOL/L (ref 99–110)
CO2: 22 MMOL/L (ref 21–32)
CREAT SERPL-MCNC: 1.5 MG/DL (ref 0.8–1.3)
EOSINOPHILS ABSOLUTE: 0.3 K/UL (ref 0–0.6)
EOSINOPHILS RELATIVE PERCENT: 1.9 %
GFR AFRICAN AMERICAN: 53
GFR NON-AFRICAN AMERICAN: 44
GLUCOSE BLD-MCNC: 90 MG/DL (ref 70–99)
HCT VFR BLD CALC: 35 % (ref 40.5–52.5)
HEMOGLOBIN: 11 G/DL (ref 13.5–17.5)
LYMPHOCYTES ABSOLUTE: 1.5 K/UL (ref 1–5.1)
LYMPHOCYTES RELATIVE PERCENT: 9.6 %
MCH RBC QN AUTO: 30.5 PG (ref 26–34)
MCHC RBC AUTO-ENTMCNC: 31.5 G/DL (ref 31–36)
MCV RBC AUTO: 96.9 FL (ref 80–100)
MONOCYTES ABSOLUTE: 1.2 K/UL (ref 0–1.3)
MONOCYTES RELATIVE PERCENT: 7.5 %
NEUTROPHILS ABSOLUTE: 12.5 K/UL (ref 1.7–7.7)
NEUTROPHILS RELATIVE PERCENT: 80.5 %
PDW BLD-RTO: 15.3 % (ref 12.4–15.4)
PHOSPHORUS: 3.5 MG/DL (ref 2.5–4.9)
PLATELET # BLD: 277 K/UL (ref 135–450)
PMV BLD AUTO: 9 FL (ref 5–10.5)
POTASSIUM SERPL-SCNC: 4.3 MMOL/L (ref 3.5–5.1)
RBC # BLD: 3.61 M/UL (ref 4.2–5.9)
SODIUM BLD-SCNC: 145 MMOL/L (ref 136–145)
WBC # BLD: 15.6 K/UL (ref 4–11)

## 2020-07-21 PROCEDURE — 85025 COMPLETE CBC W/AUTO DIFF WBC: CPT

## 2020-07-21 PROCEDURE — 2700000000 HC OXYGEN THERAPY PER DAY

## 2020-07-21 PROCEDURE — 80069 RENAL FUNCTION PANEL: CPT

## 2020-07-21 PROCEDURE — 6360000002 HC RX W HCPCS: Performed by: INTERNAL MEDICINE

## 2020-07-21 PROCEDURE — 94761 N-INVAS EAR/PLS OXIMETRY MLT: CPT

## 2020-07-21 PROCEDURE — 2580000003 HC RX 258: Performed by: PEDIATRICS

## 2020-07-21 PROCEDURE — 36415 COLL VENOUS BLD VENIPUNCTURE: CPT

## 2020-07-21 PROCEDURE — 2580000003 HC RX 258: Performed by: INTERNAL MEDICINE

## 2020-07-21 RX ORDER — FLUCONAZOLE 2 MG/ML
200 INJECTION, SOLUTION INTRAVENOUS ONCE
Status: COMPLETED | OUTPATIENT
Start: 2020-07-21 | End: 2020-07-21

## 2020-07-21 RX ORDER — 0.9 % SODIUM CHLORIDE 0.9 %
500 INTRAVENOUS SOLUTION INTRAVENOUS ONCE
Status: COMPLETED | OUTPATIENT
Start: 2020-07-21 | End: 2020-07-21

## 2020-07-21 RX ADMIN — FLUCONAZOLE 200 MG: 200 INJECTION, SOLUTION INTRAVENOUS at 10:52

## 2020-07-21 RX ADMIN — SODIUM CHLORIDE 500 ML: 9 INJECTION, SOLUTION INTRAVENOUS at 09:55

## 2020-07-21 RX ADMIN — SODIUM CHLORIDE, PRESERVATIVE FREE 10 ML: 5 INJECTION INTRAVENOUS at 09:58

## 2020-07-21 NOTE — PLAN OF CARE
Problem: Falls - Risk of:  Goal: Will remain free from falls  Description: Will remain free from falls  7/21/2020 0932 by Harley Reynolds RN  Outcome: Ongoing  Note: Fall precautions in place. Bed locked and in lowest position. Bed alarm on.  Call button within reach.   7/21/2020 0448 by Veronica Kirby RN  Outcome: Ongoing  7/21/2020 0431 by Veronica Kirby RN  Outcome: Ongoing  Goal: Absence of physical injury  Description: Absence of physical injury  7/21/2020 0932 by Harley Reynolds RN  Outcome: Ongoing  7/21/2020 0448 by Veronica Kirby RN  Outcome: Ongoing  7/21/2020 0431 by Veronica Kirby RN  Outcome: Ongoing     Problem: Skin Integrity:  Goal: Will show no infection signs and symptoms  Description: Will show no infection signs and symptoms  7/21/2020 0932 by Harley Reynolds RN  Outcome: Ongoing  7/21/2020 0448 by Veronica Kirby RN  Outcome: Ongoing  7/21/2020 0431 by Veronica Kirby RN  Outcome: Ongoing  Goal: Absence of new skin breakdown  Description: Absence of new skin breakdown  7/21/2020 0932 by Harley Reynolds RN  Outcome: Ongoing  7/21/2020 0448 by Veronica Kirby RN  Outcome: Ongoing  7/21/2020 0431 by Veronica Kirby RN  Outcome: Ongoing     Problem: Nutrition  Goal: Optimal nutrition therapy  7/21/2020 0932 by Harley Reynolds RN  Outcome: Ongoing  7/21/2020 0448 by Veronica Kirby RN  Outcome: Ongoing  7/21/2020 0431 by Veronica Kirby RN  Outcome: Ongoing

## 2020-07-21 NOTE — PROGRESS NOTES
Report called to [facility] at [facility]. Received call back from [facility] asking writer to give enema prior to leaving. Transport scheduled to arrive now so unable to obtain order and give prior to leaving. Carlo Hoang informed of this. Pt has not had BM x 4 days. BS present but hypoactive. Pt has not eaten or taken adequate amt of fluid x 1 wk. Perfect serve to Dr. Devi Han to obtain enema order d/t transport running late. Dr. Devi Han stated she doesn't feel pt needs an enema at this time. Attempted to notify Carlo Hoang at [address] [place] [address] of this but no answer x 2 calls.

## 2020-07-21 NOTE — PROGRESS NOTES
Pt discharged via stretcher via [facility]. AVS sent with transport. IV and telemetry removed prior to discharge. Writer was able to speak with Alejandra Berumen at [address] [place] [address] to inform her that MD declined the request to administer an enema prior to discharge.

## 2020-07-21 NOTE — PROGRESS NOTES
Call placed to [address] [place] [address] at [address] [place] [address] to call report. Left on hold for 10 min with no response. Will attempt to call again.

## 2020-07-21 NOTE — PROGRESS NOTES
Physician Progress Note      PATIENT:               Jackson Martinez  [facility] #:                  [id number]  :                       1932  ADMIT DATE:       2020 9:13 PM  [address] [place] DATE:  RESPONDING  PROVIDER #:        Austin Fields MD          QUERY TEXT:    Pt admitted with Sepsis gram negative Pneumonia. Pt noted to have pulmonary   edema. If possible, please document in the progress notes and discharge   summary if you are evaluating and/or treating any of the following: The medical record reflects the following:  Risk Factors: A fib HTN IVFs  Clinical Indicators: CT  Features of heart failure with moderate volume   pleural effusions, CXR  Worsening bilateral lung infiltrates with   associated bilateral pleural effusions.  PN Dr Lira Saliva   Findings are likely   due to pneumonia, but cannot rule out associated pulmonary edema, Pro BNP   6,537  Treatment: IV Lasix 20 mg oral from - then given Lasix 40 mg IV daily   x 3 days  Options provided:  -- Acute pulmonary edema due to heart disease, Afib  -- Acute pulmonary edema due to heart failure ##Please specify type and   acuity, Please specify type and acuity.   -- Noncardiogenic acute pulmonary edema due to fluid overload  -- Other - I will add my own diagnosis  -- Dismiss - Clinically unable to determine / Unknown  -- Refer to Clinical Documentation Reviewer    PROVIDER RESPONSE TEXT:    This patient has acute pulmonary edema due to heart failure Diastolic heart   failure with acute exacerbation    Query created by: Reyes Kang on 2020 1:48 PM      Electronically signed by:  Austin Fields MD 2020 2:08 PM

## 2020-07-21 NOTE — PLAN OF CARE
Pt is a high fall risk. Bed in locked, low position with side rails up X 3 and an active bed alarm. Nonskid socks, bracelet, and fall sign in place. Pt is at risk for compromised skin integrity d/t immobility. Pt is a Q2H turn while awake. Pt is a check and change. Pt continent of urine at beginning of shift but has since been incontinent. Urinal offered but pt declines. Pt has a poor PO intake. Pt chose to take his HS meds crushed with vanilla ice cream. Pt only willing to take a total of 2 bites of ice cream with meds. RN contacted pharmacy to have pt's Aspirin EC changed to crushable form. Pt refused to eat any dinner and refused to drink anything when offered. Pt's oral mucosa are dry and his tongue is dry, cracked. RN contacted pharmacy to have pt's Aspirin EC changed to crushable form.

## 2020-08-05 ENCOUNTER — OUTSIDE SERVICES (OUTPATIENT)
Dept: WOUND CARE | Age: 85
End: 2020-08-05
Payer: MEDICARE

## 2020-08-05 PROCEDURE — 99441 PR PHYS/QHP TELEPHONE EVALUATION 5-10 MIN: CPT | Performed by: CLINICAL NURSE SPECIALIST

## 2020-08-05 NOTE — PROGRESS NOTES
[facility]    Patient name: Mitchel Brady  :   [birth year]  Facility:  [address] [place] [address] Service: nursing facility (60)    VIRTUAL VISIT:  Akira Keith is a 80 y.o. male evaluated via telephone on 2020. Consent:  He and/or health care decision maker is aware that that he may receive a bill for this telephone service, depending on his insurance coverage, and has provided verbal consent to proceed: Yes      Documentation:  I communicated with the patient and/or health care decision maker about wound care. Details of this discussion including any medical advice provided: as detailed below      I affirm this is a Patient Initiated Episode with a Patient who has not had a related appointment within my department in the past 7 days or scheduled within the next 24 hours. Patient identification was verified at the start of the visit: Yes    Total Time: minutes: 5-10 minutes    Note: not billable if this call serves to triage the patient into an appointment for the relevant concern      Aylin Guerra     Primary diagnosis for wound-care consultation: Dark purple discoloration right and left buttock    Additional ulcer(s) noted?  none    History of Present Illness: Rapid deterioration despite offloading. Low-air-loss mattress and repositioning. No by mouth intake. Protein supplements offered but not taken. Hospitalized with recent right femur fracture 2023.  8/3/2020 red area to coccyx noted. Not open. Now with extensive purple discoloration to right and left buttock with off loading. 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 Hospice following sense 8/3/2020.  8/3/2020: [employer] started for white blood count of 21.6    Review of Systems: Pertinent systems reviewed in the HPI; all other systems reviewed, and negative.     Pertinent elements of past medical, surgical, family, and/or social history: Patient with dementia, CHF, recent right femur fracture, hypertension and chronic kidney disease. Medications and allergies are detailed in the nursing home chart, and were reviewed by me today.  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 Dark purple discoloration of surrounding tissue. Wound bed moist, small amount of serous drainage, edges unattached. Surrounding tissue and ulcer without signs and symptoms of infection. No purulence, malodor, increased erythema, increased temperature, or increased pain. 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 Not open. Surrounding tissue and ulcer without signs and symptoms of infection. No purulence, malodor, erythema, increased temperature, or increased pain.  _______________________    Recent labs and data reviewed: 8/1/2020: WBC 21.6, Creatinine: 2.1.  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 Rapid decline despite offloading. No by mouth intake. Hospice following. Levaquin for elevated WBC. Procedure:    Consent obtained. Time out performed per [facility]. _______________________    Recommendations:    - Dressings / Compression / Offloading: Xenaderm 3 times per day with ABD, no tape. Low-air-loss mattress. Side to side in bed.     - Labs / Diagnostic studies: none    - Medications / nutritional support: Ensure plus offered, not taken    - Further Consultations recommended: Hospice following    - Anticipated follow-up: Weekly evaluation  _______________________    Electronically signed by MIKE Medley CNP on 8/5/2020 at 2:53 PM

## 2024-04-16 NOTE — PROGRESS NOTES
Pt A&O x3, confused to date/time. Pt ate breakfast well. ABD soft, had BM yesterday. Bowel sounds active. Denies nausea. Surgical glue to R hip clean ,dry ,intact. Pt remains on 2.5L O2 sating above 90%. Spoke with pt's son who has concerns about pt's labwork and ARIANE. Explained kidney labs have remained stable, he is worried if lasix restarted he'll have issues at SNF. Wants to see if need nephro to see him before he is discharged. Messaged Dr Connie Jarvis with this request and to see if she can call him. MD read message. Await response. Will monitor.  Electronically signed by Arcadio Apgar, RN on 7/10/2020 at 9:39 AM PRE-OP DIAGNOSIS:  Quadriceps tendon rupture, left, initial encounter 16-Apr-2024 10:59:58  Abdirashid Mancuso

## (undated) DEVICE — 450 ML BOTTLE OF 0.05% CHLORHEXIDINE GLUCONATE IN 99.95% STERILE WATER FOR IRRIGATION, USP AND APPLICATOR.: Brand: IRRISEPT ANTIMICROBIAL WOUND LAVAGE

## (undated) DEVICE — SPONGE LAP W18XL18IN WHT COT 4 PLY FLD STRUNG RADPQ DISP ST

## (undated) DEVICE — SUTURE VCRL SZ 1 L27IN ABSRB UD CT-1 L36MM 1/2 CIR J261H

## (undated) DEVICE — GOWN,REINF,POLY,AURORA,XLNG/XL,STRL: Brand: MEDLINE

## (undated) DEVICE — 2108 SERIES SAGITTAL BLADE AGGRESSIVE  (25.0 X 1.19 X 85.0MM)

## (undated) DEVICE — Z DISCONTINUED USE 2716304 SUTURE STRATAFIX SPRL SZ 3-0 L12IN ABSRB UD FS-1 L24MM 3/8

## (undated) DEVICE — GLOVE ORANGE PI 8 1/2   MSG9085

## (undated) DEVICE — DRAPE,U/SHT,SPLIT,FILM,60X84,STERILE: Brand: MEDLINE

## (undated) DEVICE — SOLUTION IV IRRIG POUR BRL 0.9% SODIUM CHL 2F7124

## (undated) DEVICE — PILLOW POS W15XH6XL22IN RASPBERRY FOAM ABD W/ STRP DISP FOR

## (undated) DEVICE — 1010 S-DRAPE TOWEL DRAPE 10/BX: Brand: STERI-DRAPE™

## (undated) DEVICE — CLEANER,CAUTERY TIP,2X2",STERILE: Brand: MEDLINE

## (undated) DEVICE — GLOVE ORANGE PI 7   MSG9070

## (undated) DEVICE — INTENDED FOR TISSUE SEPARATION, AND OTHER PROCEDURES THAT REQUIRE A SHARP SURGICAL BLADE TO PUNCTURE OR CUT.: Brand: BARD-PARKER ® STAINLESS STEEL BLADES

## (undated) DEVICE — Z DUP USE 2701075 SYSTEM SKIN CLSR 42CM DERMBND PRINEO

## (undated) DEVICE — GLOVE SURG SZ 85 L12IN FNGR THK94MIL STD WHT LTX FREE

## (undated) DEVICE — ELECTRODE PT RET AD L9FT HI MOIST COND ADH HYDRGEL CORDED

## (undated) DEVICE — DRAPE,HIP,W/POUCHES,STERILE: Brand: MEDLINE

## (undated) DEVICE — Z INACTIVE USE 2660664 SOLUTION IRRIG 3000ML 0.9% SOD CHL USP UROMATIC PLAS CONT

## (undated) DEVICE — CONTAINER,SPECIMEN,PNEU TUBE,3OZ,OR STRL: Brand: MEDLINE

## (undated) DEVICE — CHLORAPREP 26ML ORANGE

## (undated) DEVICE — ELECTRODE ES L65IN DIA3MM S STL BLDE MPLR OPN APPRCH EZ TO

## (undated) DEVICE — 3M™ STERI-DRAPE™ U-DRAPE 1015: Brand: STERI-DRAPE™

## (undated) DEVICE — SUTURE ABSORBABLE BRAIDED 2-0 CT-1 27 IN UD VICRYL J259H

## (undated) DEVICE — TOTAL BASIC PK

## (undated) DEVICE — CONTAINER SPEC 165OZ POLYPR PATH SNAP LOK CAP W/ LID

## (undated) DEVICE — HANDPIECE SET WITH HIGH FLOW TIP AND SUCTION TUBE: Brand: INTERPULSE

## (undated) DEVICE — 4-PORT MANIFOLD: Brand: NEPTUNE 2

## (undated) DEVICE — MERCY HEALTH WEST TURNOVER: Brand: MEDLINE INDUSTRIES, INC.

## (undated) DEVICE — COVER,TABLE,77X90,STERILE: Brand: MEDLINE

## (undated) DEVICE — NEEDLE HYPO 22GA L1 1/2IN PIVOTING SHLD FOR LUERLOCK SYR

## (undated) DEVICE — DECANTER BAG 9": Brand: MEDLINE INDUSTRIES, INC.

## (undated) DEVICE — SYRINGE IRRIG 60ML SFT PLIABLE BLB EZ TO GRP 1 HND USE W/

## (undated) DEVICE — DRAPE,REIN 53X77,STERILE: Brand: MEDLINE

## (undated) DEVICE — PAD,ABDOMINAL,8"X7.5",STERILE,LF,1/PK: Brand: MEDLINE

## (undated) DEVICE — SPONGE GZ W4XL4IN COT 12 PLY TYP VII WVN C FLD DSGN

## (undated) DEVICE — 3M™ IOBAN™ 2 ANTIMICROBIAL INCISE DRAPE 6650EZ: Brand: IOBAN™ 2

## (undated) DEVICE — GLOVE SURG SZ 7 L12IN FNGR THK79MIL GRN LTX FREE

## (undated) DEVICE — COVER LT HNDL BLU PLAS

## (undated) DEVICE — ANTI-EMBOLISM STOCKINGS,THIGH LENGTH,LARGE-LONG-SIZE J: Brand: T.E.D.

## (undated) DEVICE — SYRINGE 20ML LL S/C 50